# Patient Record
Sex: MALE | Race: WHITE | Employment: OTHER | URBAN - METROPOLITAN AREA
[De-identification: names, ages, dates, MRNs, and addresses within clinical notes are randomized per-mention and may not be internally consistent; named-entity substitution may affect disease eponyms.]

---

## 2017-01-12 ENCOUNTER — OFFICE VISIT (OUTPATIENT)
Dept: FAMILY MEDICINE CLINIC | Age: 37
End: 2017-01-12

## 2017-01-12 VITALS
SYSTOLIC BLOOD PRESSURE: 123 MMHG | TEMPERATURE: 98.2 F | BODY MASS INDEX: 30.32 KG/M2 | DIASTOLIC BLOOD PRESSURE: 82 MMHG | HEART RATE: 73 BPM | HEIGHT: 73 IN | RESPIRATION RATE: 16 BRPM | OXYGEN SATURATION: 97 % | WEIGHT: 228.8 LBS

## 2017-01-12 DIAGNOSIS — H83.03 INFECTION OF THE INNER EAR, BILATERAL: Primary | ICD-10-CM

## 2017-01-12 DIAGNOSIS — M54.42 CHRONIC BILATERAL LOW BACK PAIN WITH LEFT-SIDED SCIATICA: ICD-10-CM

## 2017-01-12 DIAGNOSIS — J02.9 SORE THROAT: ICD-10-CM

## 2017-01-12 DIAGNOSIS — M25.511 CHRONIC RIGHT SHOULDER PAIN: ICD-10-CM

## 2017-01-12 DIAGNOSIS — G89.29 CHRONIC RIGHT SHOULDER PAIN: ICD-10-CM

## 2017-01-12 DIAGNOSIS — G89.29 CHRONIC BILATERAL LOW BACK PAIN WITH LEFT-SIDED SCIATICA: ICD-10-CM

## 2017-01-12 LAB
S PYO AG THROAT QL: NEGATIVE
VALID INTERNAL CONTROL?: YES

## 2017-01-12 RX ORDER — TRAMADOL HYDROCHLORIDE 50 MG/1
50 TABLET ORAL
Qty: 30 TAB | Refills: 0 | Status: SHIPPED | OUTPATIENT
Start: 2017-01-12 | End: 2017-02-07 | Stop reason: SDUPTHER

## 2017-01-12 RX ORDER — AMOXICILLIN 500 MG/1
500 CAPSULE ORAL 2 TIMES DAILY
Qty: 20 CAP | Refills: 0 | Status: SHIPPED | OUTPATIENT
Start: 2017-01-12 | End: 2017-01-22

## 2017-01-12 RX ORDER — GABAPENTIN 300 MG/1
300 CAPSULE ORAL 3 TIMES DAILY
Qty: 90 CAP | Refills: 2 | Status: SHIPPED | OUTPATIENT
Start: 2017-01-12 | End: 2017-02-06 | Stop reason: SDUPTHER

## 2017-01-12 NOTE — PATIENT INSTRUCTIONS
Labyrinthitis: Care Instructions  Your Care Instructions    Labyrinthitis (say \"lab-uh-rin-THY-tus\") is a problem deep inside your inner ear. It happens when the labyrinth gets inflamed. That's the part of your inner ear that helps control your balance. The problem may cause vertigo. Vertigo makes you feel like you're spinning or whirling. You may feel sick to your stomach or vomit. You may lose your hearing for a while. Or you may have a ringing sound in your ears. Most of the time, labyrinthitis goes away on its own. This often takes several weeks. If the problem is caused by bacteria, your doctor will give you antibiotics. But most cases are caused by a virus. A virus can't be cured with antibiotics. Your doctor may give you medicines to help control the nausea and vomiting. Follow-up care is a key part of your treatment and safety. Be sure to make and go to all appointments, and call your doctor if you are having problems. It's also a good idea to know your test results and keep a list of the medicines you take. How can you care for yourself at home? · Try bed rest and keeping your head still for the first few days you have vertigo. This may help the vertigo and reduce nausea and vomiting. · Return to your normal activities if vertigo lasts more than a few days. This may be hard, but it usually helps your brain adapt to the vertigo more quickly. As your brain adapts, vertigo will slowly go away. · Do what you can to prevent falls. For example, keep your home uncluttered, and use nonskid mats around your house and in your bath. Vertigo makes you more likely to fall. · Try balance exercises for vertigo if your doctor suggests it. An example is to stand with your feet together, arms at your sides. Hold this position for 30 seconds. · Do the Eastman-Daroff exercise if your doctor suggests it. It may help your brain adapt to vertigo. ¨ Sit on the edge of your bed or sofa.   ¨ Quickly lie down on one side.  ¨ Stay in this position until the vertigo goes away or for at least 30 seconds. ¨ Sit up. If this causes vertigo, wait for it to stop. ¨ Do the exercise on the other side. ¨ Repeat these steps 10 times. Do the exercise 2 times a day until the vertigo is gone. · Take your medicines exactly as prescribed. Call your doctor if you think you are having a problem with your medicine. · If your doctor prescribed antibiotics, take them as directed. Do not stop taking them just because you feel better. You need to take the full course of antibiotics. When should you call for help? Call 911 anytime you think you may need emergency care. For example, call if:  · You passed out (lost consciousness). · You have symptoms of a stroke. These may include:  ¨ Sudden numbness, tingling, weakness, or loss of movement in your face, arm, or leg, especially on only one side of your body. ¨ Sudden vision changes. ¨ Sudden trouble speaking. ¨ Sudden confusion or trouble understanding simple statements. ¨ Sudden problems with walking or balance. ¨ A sudden, severe headache that is different from past headaches. Call your doctor now or seek immediate medical care if:  · You have new or increased nausea or vomiting. Watch closely for changes in your health, and be sure to contact your doctor if:  · Your vertigo gets worse. · Your vertigo has not gotten better in 2 weeks. Where can you learn more? Go to http://dell-sherie.info/. Enter J492 in the search box to learn more about \"Labyrinthitis: Care Instructions. \"  Current as of: July 29, 2016  Content Version: 11.1  © 4042-7839 [employer]. Care instructions adapted under license by [employer] (which disclaims liability or warranty for this information).  If you have questions about a medical condition or this instruction, always ask your healthcare professional. [place] [address] any warranty or liability for your use of this information.

## 2017-01-12 NOTE — PROGRESS NOTES
1. Have you been to the ER, urgent care clinic since your last visit? Hospitalized since your last visit? No    2. Have you seen or consulted any other health care providers outside of the [address] [place] [address] since your last visit? Include any pap smears or colon screening.  No     Patient states he is here for sore throat and follow up on labs

## 2017-01-12 NOTE — PROGRESS NOTES
Chief Complaint   Patient presents with    Follow-up     shoulder pain    Sore Throat     SUBJECTIVE:   Adina Go is a 43 y.o.  male who complains of sore throat, swollen glands, dry cough and pain while swallowing for 7 days. Denies a history of fevers, shortness of breath, wheezing and sputum production. Patient does not smoke cigarettes. Also, xr shoulder show no lesion. He is requesting for medication refill. OBJECTIVE:  Blood pressure 123/82, pulse 73, temperature 98.2 °F (36.8 °C), temperature source Oral, resp. rate 16, height 6' 1\" (1.854 m), weight 228 lb 12.8 oz (103.8 kg), SpO2 97 %. Appears well, vital signs are as noted. Ears: bilateral tm bulging, Pharynx erythema w/o lesion. Neck supple. Palpable anterior adenopathy. Nose not congested. Sinuses non tender. Lungs: clear, without wheezes or rales. Recent Results (from the past 12 hour(s))   AMB POC RAPID STREP A    Collection Time: 01/12/17 12:24 PM   Result Value Ref Range    VALID INTERNAL CONTROL POC Yes     Group A Strep Ag Negative Negative     ASSESSMENT/PLAN:    ICD-10-CM ICD-9-CM    1. Infection of the inner ear, bilateral H83.03 386.30 amoxicillin (AMOXIL) 500 mg capsule      gabapentin (NEURONTIN) 300 mg capsule   2. Sore throat J02.9 462 AMB POC RAPID STREP A      amoxicillin (AMOXIL) 500 mg capsule   3. Chronic right shoulder pain M25.511 719.41 REFERRAL TO ORTHOPEDIC SURGERY    G89.29 338.29 gabapentin (NEURONTIN) 300 mg capsule      traMADol (ULTRAM) 50 mg tablet   4. Chronic bilateral low back pain with left-sided sciatica M54.42 724.2 gabapentin (NEURONTIN) 300 mg capsule    G89.29 724.3 traMADol (ULTRAM) 50 mg tablet     338.29      Patient Instructions        Labyrinthitis: Care Instructions  Your Care Instructions    Labyrinthitis (say \"lab-uh-rin-THY-tus\") is a problem deep inside your inner ear. It happens when the labyrinth gets inflamed.  That's the part of your inner ear that helps control your balance. The problem may cause vertigo. Vertigo makes you feel like you're spinning or whirling. You may feel sick to your stomach or vomit. You may lose your hearing for a while. Or you may have a ringing sound in your ears. Most of the time, labyrinthitis goes away on its own. This often takes several weeks. If the problem is caused by bacteria, your doctor will give you antibiotics. But most cases are caused by a virus. A virus can't be cured with antibiotics. Your doctor may give you medicines to help control the nausea and vomiting. Follow-up care is a key part of your treatment and safety. Be sure to make and go to all appointments, and call your doctor if you are having problems. It's also a good idea to know your test results and keep a list of the medicines you take. How can you care for yourself at home? · Try bed rest and keeping your head still for the first few days you have vertigo. This may help the vertigo and reduce nausea and vomiting. · Return to your normal activities if vertigo lasts more than a few days. This may be hard, but it usually helps your brain adapt to the vertigo more quickly. As your brain adapts, vertigo will slowly go away. · Do what you can to prevent falls. For example, keep your home uncluttered, and use nonskid mats around your house and in your bath. Vertigo makes you more likely to fall. · Try balance exercises for vertigo if your doctor suggests it. An example is to stand with your feet together, arms at your sides. Hold this position for 30 seconds. · Do the Eastman-Daroff exercise if your doctor suggests it. It may help your brain adapt to vertigo. ¨ Sit on the edge of your bed or sofa. ¨ Quickly lie down on one side. ¨ Stay in this position until the vertigo goes away or for at least 30 seconds. ¨ Sit up. If this causes vertigo, wait for it to stop. ¨ Do the exercise on the other side. ¨ Repeat these steps 10 times.  Do the exercise 2 times a day until the vertigo is gone. · Take your medicines exactly as prescribed. Call your doctor if you think you are having a problem with your medicine. · If your doctor prescribed antibiotics, take them as directed. Do not stop taking them just because you feel better. You need to take the full course of antibiotics. When should you call for help? Call 911 anytime you think you may need emergency care. For example, call if:  · You passed out (lost consciousness). · You have symptoms of a stroke. These may include:  ¨ Sudden numbness, tingling, weakness, or loss of movement in your face, arm, or leg, especially on only one side of your body. ¨ Sudden vision changes. ¨ Sudden trouble speaking. ¨ Sudden confusion or trouble understanding simple statements. ¨ Sudden problems with walking or balance. ¨ A sudden, severe headache that is different from past headaches. Call your doctor now or seek immediate medical care if:  · You have new or increased nausea or vomiting. Watch closely for changes in your health, and be sure to contact your doctor if:  · Your vertigo gets worse. · Your vertigo has not gotten better in 2 weeks. Where can you learn more? Go to http://dell-sherie.info/. Enter M035 in the search box to learn more about \"Labyrinthitis: Care Instructions. \"  Current as of: July 29, 2016  Content Version: 11.1  © 7395-4637 [employer]. Care instructions adapted under license by [employer] (which disclaims liability or warranty for this information). If you have questions about a medical condition or this instruction, always ask your healthcare professional. [place] [address] any warranty or liability for your use of this information. Follow-up Disposition:  Return 2 weeks, for f/u treatment.

## 2017-01-12 NOTE — MR AVS SNAPSHOT
Visit Information Date & Time Provider Department Dept. Phone Encounter #  
 1/12/2017 12:00 PM Bhavya Vazquez MD [facility] [address] [place] [address] [id number] Follow-up Instructions Return 2 weeks, for f/u treatment. Your Appointments 2/6/2017  8:00 AM  
ROUTINE CARE with Bhavya Vazquez MD  
[facility]) Appt Note: 3w fu; r/s from 12-30-16; r/s from 1-11-17  
 [address] [id number]  
[address]  
  
    
 3/21/2017 10:00 AM  
ROUTINE CARE with Bhavya Vazquez MD  
[facility]) Appt Note: 4m fu  
 [address] [facility]  
[phone] Upcoming Health Maintenance Date Due DTaP/Tdap/Td series (2 - Td) 10/4/2023 Allergies as of 1/12/2017  Review Complete On: 1/12/2017 By: Doug Parada LPN No Known Allergies Current Immunizations  Reviewed on 10/4/2013 Name Date Influenza Vaccine 10/4/2013 Influenza Vaccine (Quad) PF 10/27/2016, 11/25/2014 11:48 AM  
 Tdap 10/4/2013 Not reviewed this visit You Were Diagnosed With   
  
 Codes Comments Infection of the inner ear, bilateral    -  Primary ICD-10-CM: H83.03 
ICD-9-CM: 386.30 Sore throat     ICD-10-CM: J02.9 ICD-9-CM: 228 Chronic right shoulder pain     ICD-10-CM: M25.511, G89.29 ICD-9-CM: 719.41, 338.29 Chronic bilateral low back pain with left-sided sciatica     ICD-10-CM: M54.42, G89.29 ICD-9-CM: 724.2, 724.3, 338.29 Vitals BP Pulse Temp Resp Height(growth percentile) Weight(growth percentile) 123/82 (BP 1 Location: Right arm, BP Patient Position: Sitting) 73 98.2 °F (36.8 °C) (Oral) 16 6' 1\" (1.854 m) 228 lb 12.8 oz (103.8 kg) SpO2 BMI Smoking Status 97% 30.19 kg/m2 Former Smoker Vitals History BMI and BSA Data Body Mass Index Body Surface Area  
 30.19 kg/m 2 2.31 m 2 Preferred Pharmacy Pharmacy Name Phone [address], [address] [place] [address] [phone] Your Updated Medication List  
  
   
This list is accurate as of: 1/12/17 12:59 PM.  Always use your most recent med list.  
  
  
  
  
 amoxicillin 500 mg capsule Commonly known as:  AMOXIL Take 1 Cap by mouth two (2) times a day for 10 days. cholecalciferol (VITAMIN D3) 5,000 unit Tab tablet Commonly known as:  VITAMIN D3 Take 1 Tab by mouth daily. diclofenac 100 mg ER tablet Commonly known as:  VOLTAREN XR Take 1 Tab by mouth daily. With meal  
  
 gabapentin 300 mg capsule Commonly known as:  NEURONTIN Take 1 Cap by mouth three (3) times daily. Indications: NEUROPATHIC PAIN  
  
 methylPREDNISolone 4 mg tablet Commonly known as:  [place] Take as directed  
  
 traMADol 50 mg tablet Commonly known as:  ULTRAM  
Take 1 Tab by mouth every eight (8) hours as needed. Max Daily Amount: 150 mg. Indications: PAIN Prescriptions Printed Refills  
 traMADol (ULTRAM) 50 mg tablet 0 Sig: Take 1 Tab by mouth every eight (8) hours as needed. Max Daily Amount: 150 mg. Indications: PAIN Class: Print Route: Oral  
  
Prescriptions Sent to Pharmacy Refills  
 amoxicillin (AMOXIL) 500 mg capsule 0 Sig: Take 1 Cap by mouth two (2) times a day for 10 days. Class: Normal  
 Pharmacy: [address], [address] [place] [address] Ph #: [phone] Route: Oral  
 gabapentin (NEURONTIN) 300 mg capsule 2 Sig: Take 1 Cap by mouth three (3) times daily. Indications: NEUROPATHIC PAIN Class: Normal  
 Pharmacy: [address], [address] [place] [address] Ph #: [phone] Route: Oral  
  
We Performed the Following AMB POC RAPID STREP A [97497 CPT(R)] REFERRAL TO ORTHOPEDIC SURGERY [REF62 Custom] Comments:  
 Please evaluate patient for right shoulder bursitis Follow-up Instructions Return 2 weeks, for f/u treatment. Referral Information Referral ID Referred By Referred To  
  
 [id number] [facility] [id number], [address] Vis[address] Status Start Date End Date 1 New Request 1/12/17 1/12/18 If your referral has a status of pending review or denied, additional information will be sent to support the outcome of this decision. Patient Instructions Labyrinthitis: Care Instructions Your Care Instructions Labyrinthitis (say \"lab-uh-rin-THY-tus\") is a problem deep inside your inner ear. It happens when the labyrinth gets inflamed. That's the part of your inner ear that helps control your balance. The problem may cause vertigo. Vertigo makes you feel like you're spinning or whirling. You may feel sick to your stomach or vomit. You may lose your hearing for a while. Or you may have a ringing sound in your ears. Most of the time, labyrinthitis goes away on its own. This often takes several weeks. If the problem is caused by bacteria, your doctor will give you antibiotics. But most cases are caused by a virus. A virus can't be cured with antibiotics. Your doctor may give you medicines to help control the nausea and vomiting. Follow-up care is a key part of your treatment and safety. Be sure to make and go to all appointments, and call your doctor if you are having problems. It's also a good idea to know your test results and keep a list of the medicines you take. How can you care for yourself at home? · Try bed rest and keeping your head still for the first few days you have vertigo. This may help the vertigo and reduce nausea and vomiting. · Return to your normal activities if vertigo lasts more than a few days. This may be hard, but it usually helps your brain adapt to the vertigo more quickly. As your brain adapts, vertigo will slowly go away. · Do what you can to prevent falls. For example, keep your home uncluttered, and use nonskid mats around your house and in your bath. Vertigo makes you more likely to fall. · Try balance exercises for vertigo if your doctor suggests it. An example is to stand with your feet together, arms at your sides. Hold this position for 30 seconds. · Do the Eastman-Daroff exercise if your doctor suggests it. It may help your brain adapt to vertigo. ¨ Sit on the edge of your bed or sofa. ¨ Quickly lie down on one side. ¨ Stay in this position until the vertigo goes away or for at least 30 seconds. ¨ Sit up. If this causes vertigo, wait for it to stop. ¨ Do the exercise on the other side. ¨ Repeat these steps 10 times. Do the exercise 2 times a day until the vertigo is gone. · Take your medicines exactly as prescribed. Call your doctor if you think you are having a problem with your medicine. · If your doctor prescribed antibiotics, take them as directed. Do not stop taking them just because you feel better. You need to take the full course of antibiotics. When should you call for help? Call 911 anytime you think you may need emergency care. For example, call if: 
· You passed out (lost consciousness). · You have symptoms of a stroke. These may include: 
¨ Sudden numbness, tingling, weakness, or loss of movement in your face, arm, or leg, especially on only one side of your body. ¨ Sudden vision changes. ¨ Sudden trouble speaking. ¨ Sudden confusion or trouble understanding simple statements. ¨ Sudden problems with walking or balance. ¨ A sudden, severe headache that is different from past headaches. Call your doctor now or seek immediate medical care if: 
· You have new or increased nausea or vomiting.  
Watch closely for changes in your health, and be sure to contact your doctor if: 
· Your vertigo gets worse. · Your vertigo has not gotten better in 2 weeks. Where can you learn more? Go to http://dell-sherie.info/. Enter M676 in the search box to learn more about \"Labyrinthitis: Care Instructions. \" Current as of: July 29, 2016 Content Version: 11.1 © 6328-6071 [employer]. Care instructions adapted under license by [employer] (which disclaims liability or warranty for this information). If you have questions about a medical condition or this instruction, always ask your healthcare professional. [address] any warranty or liability for your use of this information. Introducing [facility]! Dear Argenis Oglesby: Thank you for requesting a [employer] account. Our records indicate that you already have an active [employer] account. You can access your account anytime at [email or web address] Did you know that you can access your hospital and ER discharge instructions at any time in [employer]? You can also review all of your test results from your hospital stay or ER visit. Additional Information If you have questions, please visit the Frequently Asked Questions section of the [employer] website at [email or web address]. Remember, [employer] is NOT to be used for urgent needs. For medical emergencies, dial 911. Now available from your iPhone and Android! Please provide this summary of care documentation to your next provider. Your primary care clinician is listed as Bhavya Vazquez. If you have any questions after today's visit, please call [phone].

## 2017-02-06 DIAGNOSIS — G89.29 CHRONIC BILATERAL LOW BACK PAIN WITH LEFT-SIDED SCIATICA: ICD-10-CM

## 2017-02-06 DIAGNOSIS — M25.511 CHRONIC RIGHT SHOULDER PAIN: ICD-10-CM

## 2017-02-06 DIAGNOSIS — H83.03 INFECTION OF THE INNER EAR, BILATERAL: ICD-10-CM

## 2017-02-06 DIAGNOSIS — G89.29 CHRONIC RIGHT SHOULDER PAIN: ICD-10-CM

## 2017-02-06 DIAGNOSIS — E55.9 HYPOVITAMINOSIS D: ICD-10-CM

## 2017-02-06 DIAGNOSIS — M54.42 CHRONIC BILATERAL LOW BACK PAIN WITH LEFT-SIDED SCIATICA: ICD-10-CM

## 2017-02-07 DIAGNOSIS — M25.511 CHRONIC RIGHT SHOULDER PAIN: ICD-10-CM

## 2017-02-07 DIAGNOSIS — G89.29 CHRONIC BILATERAL LOW BACK PAIN WITH LEFT-SIDED SCIATICA: ICD-10-CM

## 2017-02-07 DIAGNOSIS — M54.42 CHRONIC BILATERAL LOW BACK PAIN WITH LEFT-SIDED SCIATICA: ICD-10-CM

## 2017-02-07 DIAGNOSIS — G89.29 CHRONIC RIGHT SHOULDER PAIN: ICD-10-CM

## 2017-02-16 NOTE — TELEPHONE ENCOUNTER
From: Jatin Winters. To: Diane Starks MD  Sent: 2/7/2017 10:50 AM EST  Subject: Medication Renewal Request    Original authorizing provider: MD Jatin Eden. would like a refill of the following medications:  traMADol (ULTRAM) 50 mg tablet [Moisés Wagoner MD]    Preferred pharmacy: [facility]/PHARMACY #[id number] - [address] [facility], [address] [place] [address] AT [address]NER OF [facility] [address]    Comment:

## 2017-02-16 NOTE — TELEPHONE ENCOUNTER
From: Evy Webb. To: Marline Aceves MD  Sent: 2/6/2017 3:19 PM EST  Subject: Medication Renewal Request    Original authorizing provider: MD Evy Lynn. would like a refill of the following medications:  cholecalciferol, VITAMIN D3, (VITAMIN D3) 5,000 unit tab tablet [Moisés Hlot MD]  gabapentin (NEURONTIN) 300 mg capsule [Moisés Sommer MD]    Preferred pharmacy: [facility]/PHARMACY #[id number] - [facility], [address] [place] [address] AT [address] OF [facility] [address]    Comment:

## 2017-02-17 RX ORDER — CHOLECALCIFEROL TAB 125 MCG (5000 UNIT) 125 MCG
5000 TAB ORAL DAILY
Qty: 90 TAB | Refills: 3 | Status: SHIPPED | OUTPATIENT
Start: 2017-02-17

## 2017-02-17 RX ORDER — TRAMADOL HYDROCHLORIDE 50 MG/1
50 TABLET ORAL
Qty: 30 TAB | Refills: 0 | Status: SHIPPED | OUTPATIENT
Start: 2017-02-17 | End: 2017-02-22

## 2017-02-17 RX ORDER — GABAPENTIN 300 MG/1
300 CAPSULE ORAL 3 TIMES DAILY
Qty: 90 CAP | Refills: 2 | Status: SHIPPED | OUTPATIENT
Start: 2017-02-17 | End: 2017-05-12 | Stop reason: SDUPTHER

## 2017-02-22 ENCOUNTER — APPOINTMENT (OUTPATIENT)
Dept: CT IMAGING | Age: 37
End: 2017-02-22
Attending: EMERGENCY MEDICINE
Payer: COMMERCIAL

## 2017-02-22 ENCOUNTER — HOSPITAL ENCOUNTER (EMERGENCY)
Age: 37
Discharge: HOME | End: 2017-02-22
Attending: EMERGENCY MEDICINE
Payer: COMMERCIAL

## 2017-02-22 VITALS
HEART RATE: 86 BPM | HEIGHT: 73 IN | BODY MASS INDEX: 30.48 KG/M2 | TEMPERATURE: 97.7 F | DIASTOLIC BLOOD PRESSURE: 77 MMHG | RESPIRATION RATE: 20 BRPM | SYSTOLIC BLOOD PRESSURE: 162 MMHG | OXYGEN SATURATION: 96 % | WEIGHT: 230 LBS

## 2017-02-22 DIAGNOSIS — S39.012D BACK STRAIN, SUBSEQUENT ENCOUNTER: ICD-10-CM

## 2017-02-22 DIAGNOSIS — Z87.39 H/O LOW BACK PAIN: ICD-10-CM

## 2017-02-22 DIAGNOSIS — M54.41 ACUTE BILATERAL LOW BACK PAIN WITH BILATERAL SCIATICA: Primary | ICD-10-CM

## 2017-02-22 DIAGNOSIS — M62.830 SPASM OF BACK MUSCLES: ICD-10-CM

## 2017-02-22 DIAGNOSIS — M54.42 ACUTE BILATERAL LOW BACK PAIN WITH BILATERAL SCIATICA: Primary | ICD-10-CM

## 2017-02-22 LAB
AMPHET UR QL SCN: NEGATIVE
ANION GAP BLD CALC-SCNC: 9 MMOL/L (ref 5–15)
APPEARANCE UR: CLEAR
BACTERIA URNS QL MICRO: NEGATIVE /HPF
BARBITURATES UR QL SCN: NEGATIVE
BASOPHILS # BLD AUTO: 0 K/UL (ref 0–0.1)
BASOPHILS # BLD: 0 % (ref 0–1)
BENZODIAZ UR QL: NEGATIVE
BILIRUB UR QL: NEGATIVE
BUN SERPL-MCNC: 9 MG/DL (ref 6–20)
BUN/CREAT SERPL: 8 (ref 12–20)
CALCIUM SERPL-MCNC: 8.7 MG/DL (ref 8.5–10.1)
CANNABINOIDS UR QL SCN: NEGATIVE
CHLORIDE SERPL-SCNC: 105 MMOL/L (ref 97–108)
CO2 SERPL-SCNC: 29 MMOL/L (ref 21–32)
COCAINE UR QL SCN: NEGATIVE
COLOR UR: ABNORMAL
CREAT SERPL-MCNC: 1.06 MG/DL (ref 0.7–1.3)
DRUG SCRN COMMENT,DRGCM: NORMAL
EOSINOPHIL # BLD: 0.1 K/UL (ref 0–0.4)
EOSINOPHIL NFR BLD: 1 % (ref 0–7)
EPITH CASTS URNS QL MICRO: ABNORMAL /LPF
ERYTHROCYTE [DISTWIDTH] IN BLOOD BY AUTOMATED COUNT: 12.6 % (ref 11.5–14.5)
GLUCOSE SERPL-MCNC: 204 MG/DL (ref 65–100)
GLUCOSE UR STRIP.AUTO-MCNC: NEGATIVE MG/DL
HCT VFR BLD AUTO: 41.8 % (ref 36.6–50.3)
HGB BLD-MCNC: 13.9 G/DL (ref 12.1–17)
HGB UR QL STRIP: NEGATIVE
KETONES UR QL STRIP.AUTO: NEGATIVE MG/DL
LEUKOCYTE ESTERASE UR QL STRIP.AUTO: NEGATIVE
LYMPHOCYTES # BLD AUTO: 12 % (ref 12–49)
LYMPHOCYTES # BLD: 0.8 K/UL (ref 0.8–3.5)
MCH RBC QN AUTO: 30.1 PG (ref 26–34)
MCHC RBC AUTO-ENTMCNC: 33.3 G/DL (ref 30–36.5)
MCV RBC AUTO: 90.5 FL (ref 80–99)
METHADONE UR QL: NEGATIVE
MONOCYTES # BLD: 0.2 K/UL (ref 0–1)
MONOCYTES NFR BLD AUTO: 3 % (ref 5–13)
MUCOUS THREADS URNS QL MICRO: ABNORMAL /LPF
NEUTS SEG # BLD: 6 K/UL (ref 1.8–8)
NEUTS SEG NFR BLD AUTO: 84 % (ref 32–75)
NITRITE UR QL STRIP.AUTO: NEGATIVE
OPIATES UR QL: NEGATIVE
PCP UR QL: NEGATIVE
PH UR STRIP: 7.5 [PH] (ref 5–8)
PLATELET # BLD AUTO: 239 K/UL (ref 150–400)
POTASSIUM SERPL-SCNC: 3.6 MMOL/L (ref 3.5–5.1)
PROT UR STRIP-MCNC: NEGATIVE MG/DL
RBC # BLD AUTO: 4.62 M/UL (ref 4.1–5.7)
RBC #/AREA URNS HPF: ABNORMAL /HPF (ref 0–5)
SODIUM SERPL-SCNC: 143 MMOL/L (ref 136–145)
SP GR UR REFRACTOMETRY: <1.005 (ref 1–1.03)
UA: UC IF INDICATED,UAUC: ABNORMAL
UROBILINOGEN UR QL STRIP.AUTO: 0.2 EU/DL (ref 0.2–1)
WBC # BLD AUTO: 7.1 K/UL (ref 4.1–11.1)
WBC URNS QL MICRO: ABNORMAL /HPF (ref 0–4)

## 2017-02-22 PROCEDURE — 99285 EMERGENCY DEPT VISIT HI MDM: CPT

## 2017-02-22 PROCEDURE — 74011250637 HC RX REV CODE- 250/637: Performed by: EMERGENCY MEDICINE

## 2017-02-22 PROCEDURE — 96376 TX/PRO/DX INJ SAME DRUG ADON: CPT

## 2017-02-22 PROCEDURE — 74011250636 HC RX REV CODE- 250/636: Performed by: EMERGENCY MEDICINE

## 2017-02-22 PROCEDURE — 85025 COMPLETE CBC W/AUTO DIFF WBC: CPT | Performed by: EMERGENCY MEDICINE

## 2017-02-22 PROCEDURE — 80048 BASIC METABOLIC PNL TOTAL CA: CPT | Performed by: EMERGENCY MEDICINE

## 2017-02-22 PROCEDURE — 96361 HYDRATE IV INFUSION ADD-ON: CPT

## 2017-02-22 PROCEDURE — 80307 DRUG TEST PRSMV CHEM ANLYZR: CPT | Performed by: EMERGENCY MEDICINE

## 2017-02-22 PROCEDURE — 36415 COLL VENOUS BLD VENIPUNCTURE: CPT | Performed by: EMERGENCY MEDICINE

## 2017-02-22 PROCEDURE — 96374 THER/PROPH/DIAG INJ IV PUSH: CPT

## 2017-02-22 PROCEDURE — 81001 URINALYSIS AUTO W/SCOPE: CPT | Performed by: EMERGENCY MEDICINE

## 2017-02-22 PROCEDURE — 96375 TX/PRO/DX INJ NEW DRUG ADDON: CPT

## 2017-02-22 PROCEDURE — 72131 CT LUMBAR SPINE W/O DYE: CPT

## 2017-02-22 RX ORDER — HYDROMORPHONE HYDROCHLORIDE 2 MG/1
2 TABLET ORAL ONCE
Status: COMPLETED | OUTPATIENT
Start: 2017-02-22 | End: 2017-02-22

## 2017-02-22 RX ORDER — HYDROMORPHONE HYDROCHLORIDE 1 MG/ML
0.5 INJECTION, SOLUTION INTRAMUSCULAR; INTRAVENOUS; SUBCUTANEOUS
Status: COMPLETED | OUTPATIENT
Start: 2017-02-22 | End: 2017-02-22

## 2017-02-22 RX ORDER — DEXAMETHASONE SODIUM PHOSPHATE 10 MG/ML
10 INJECTION INTRAMUSCULAR; INTRAVENOUS
Status: COMPLETED | OUTPATIENT
Start: 2017-02-22 | End: 2017-02-22

## 2017-02-22 RX ORDER — KETOROLAC TROMETHAMINE 30 MG/ML
30 INJECTION, SOLUTION INTRAMUSCULAR; INTRAVENOUS
Status: COMPLETED | OUTPATIENT
Start: 2017-02-22 | End: 2017-02-22

## 2017-02-22 RX ORDER — SODIUM CHLORIDE 9 MG/ML
150 INJECTION, SOLUTION INTRAVENOUS CONTINUOUS
Status: DISCONTINUED | OUTPATIENT
Start: 2017-02-22 | End: 2017-02-23 | Stop reason: HOSPADM

## 2017-02-22 RX ORDER — METHOCARBAMOL 750 MG/1
750 TABLET, FILM COATED ORAL
Qty: 16 TAB | Refills: 0 | Status: SHIPPED | OUTPATIENT
Start: 2017-02-22 | End: 2017-02-22

## 2017-02-22 RX ORDER — LIDOCAINE 50 MG/G
1 PATCH TOPICAL EVERY 24 HOURS
Qty: 5 PATCH | Refills: 0 | Status: SHIPPED | OUTPATIENT
Start: 2017-02-22 | End: 2017-02-27

## 2017-02-22 RX ORDER — METHOCARBAMOL 750 MG/1
750 TABLET, FILM COATED ORAL
Qty: 16 TAB | Refills: 0 | Status: SHIPPED | OUTPATIENT
Start: 2017-02-22 | End: 2017-10-25 | Stop reason: SDUPTHER

## 2017-02-22 RX ORDER — DIAZEPAM 10 MG/2ML
5 INJECTION INTRAMUSCULAR
Status: COMPLETED | OUTPATIENT
Start: 2017-02-22 | End: 2017-02-22

## 2017-02-22 RX ORDER — HYDROMORPHONE HYDROCHLORIDE 2 MG/1
2 TABLET ORAL
Qty: 15 TAB | Refills: 0 | Status: SHIPPED | OUTPATIENT
Start: 2017-02-22 | End: 2017-07-06 | Stop reason: ALTCHOICE

## 2017-02-22 RX ORDER — DIPHENHYDRAMINE HYDROCHLORIDE 50 MG/ML
25 INJECTION, SOLUTION INTRAMUSCULAR; INTRAVENOUS
Status: COMPLETED | OUTPATIENT
Start: 2017-02-22 | End: 2017-02-22

## 2017-02-22 RX ORDER — LIDOCAINE 50 MG/G
1 PATCH TOPICAL ONCE
Status: DISCONTINUED | OUTPATIENT
Start: 2017-02-22 | End: 2017-02-23 | Stop reason: HOSPADM

## 2017-02-22 RX ORDER — HYDROMORPHONE HYDROCHLORIDE 1 MG/ML
1 INJECTION, SOLUTION INTRAMUSCULAR; INTRAVENOUS; SUBCUTANEOUS
Status: COMPLETED | OUTPATIENT
Start: 2017-02-22 | End: 2017-02-22

## 2017-02-22 RX ORDER — METHOCARBAMOL 500 MG/1
500 TABLET, FILM COATED ORAL ONCE
Status: COMPLETED | OUTPATIENT
Start: 2017-02-22 | End: 2017-02-22

## 2017-02-22 RX ORDER — PREDNISONE 10 MG/1
TABLET ORAL
Qty: 11 TAB | Refills: 0 | Status: SHIPPED | OUTPATIENT
Start: 2017-02-22 | End: 2017-05-28 | Stop reason: CLARIF

## 2017-02-22 RX ORDER — HYDROMORPHONE HYDROCHLORIDE 1 MG/ML
1 INJECTION, SOLUTION INTRAMUSCULAR; INTRAVENOUS; SUBCUTANEOUS
Status: DISCONTINUED | OUTPATIENT
Start: 2017-02-22 | End: 2017-02-22

## 2017-02-22 RX ORDER — MORPHINE SULFATE 2 MG/ML
2 INJECTION, SOLUTION INTRAMUSCULAR; INTRAVENOUS
Status: COMPLETED | OUTPATIENT
Start: 2017-02-22 | End: 2017-02-22

## 2017-02-22 RX ADMIN — SODIUM CHLORIDE 150 ML/HR: 900 INJECTION, SOLUTION INTRAVENOUS at 16:33

## 2017-02-22 RX ADMIN — KETOROLAC TROMETHAMINE 30 MG: 30 INJECTION, SOLUTION INTRAMUSCULAR at 17:21

## 2017-02-22 RX ADMIN — DIAZEPAM 5 MG: 5 INJECTION, SOLUTION INTRAMUSCULAR; INTRAVENOUS at 18:33

## 2017-02-22 RX ADMIN — DEXAMETHASONE SODIUM PHOSPHATE 10 MG: 10 INJECTION, SOLUTION INTRAMUSCULAR; INTRAVENOUS at 17:20

## 2017-02-22 RX ADMIN — DIPHENHYDRAMINE HYDROCHLORIDE 25 MG: 50 INJECTION INTRAMUSCULAR; INTRAVENOUS at 19:50

## 2017-02-22 RX ADMIN — HYDROMORPHONE HYDROCHLORIDE 1 MG: 1 INJECTION, SOLUTION INTRAMUSCULAR; INTRAVENOUS; SUBCUTANEOUS at 16:26

## 2017-02-22 RX ADMIN — DIAZEPAM 5 MG: 5 INJECTION, SOLUTION INTRAMUSCULAR; INTRAVENOUS at 16:29

## 2017-02-22 RX ADMIN — HYDROMORPHONE HYDROCHLORIDE 0.5 MG: 1 INJECTION, SOLUTION INTRAMUSCULAR; INTRAVENOUS; SUBCUTANEOUS at 17:21

## 2017-02-22 RX ADMIN — HYDROMORPHONE HYDROCHLORIDE 2 MG: 2 TABLET ORAL at 18:33

## 2017-02-22 RX ADMIN — METHOCARBAMOL 500 MG: 500 TABLET ORAL at 17:20

## 2017-02-22 RX ADMIN — Medication 2 MG: at 19:50

## 2017-02-22 NOTE — LETTER
[facility] EMERGENCY DEPT 
[address] [id number] [phone] Work/School Note Date: 2/22/2017 To Whom It May concern: 
 
Evy Webb. was seen and treated today in the emergency room by the following provider(s): 
Attending Provider: Romero Ramos MD. Evy Cheek may return to work on 2/27/2017. Sincerely, Romero Ramos MD

## 2017-02-22 NOTE — ED NOTES
Pt eating chips, soda, and crackers while staff entered the room. Pt denied any nausea. No si/s of acute distress. Pt reported 10/10 back pain but appears more comfortable than earlier. Pt's girlfriend at bedside. Call bell within reach.

## 2017-02-22 NOTE — ED PROVIDER NOTES
HPI Comments: Bright Mojica is a 43 y.o. male, pmhx significant for HTN, and chronic back pain, who presents via EMS to the ED c/o acute on chronic bilateral lower back pain, worse on the right x 3 days. Pt describes them as sharp spasms on his back and is unable to tolerate walking. Pt states that at the onset of his pain, he was in [facility] for work 3 days ago and states that he already had some pain, but it was manageable. He was at dinner and stood up when he states that he almost fell secondary to pain, and he felt like his back was going to give way. He was seen in an ED in [facility] for the pain and discharged home. He arrived back in [place] the next day, and states that he \"stood up, felt a pop, and passed out from the pain. \" On Monday, pt states that he went from a sitting to a standing position and had to lower himself to the floor secondary to pain, adding that he passed out twice secondary to pain. Pt follows up with a specialist for his back and is prescribed Tramadol which he takes as needed for pain. He states that his original injury was 18 years ago, and that he had a spinal fusion 15 years ago to try to manage the pain. He had an MRI study in November 2016 which was unremarkable. He notes that 1 month ago he was given an epidural steroid injection that he states was very effective for his pain until 3 days ago. Pt denies any strenuous duty at work. Pt specifically denies constipation, diarrhea, fevers, and chills. PCP: Juan Antonio Dee MD    Social Hx: -tobacco (former), +EtOH, -Illicit Drugs    There are no other complaints, changes, or physical findings at this time. The history is provided by the patient. No  was used.         Past Medical History:   Diagnosis Date    Back pain     Chronic pain     back    Concussion     H/O spinal fusion     15 years ago    Hypertension        Past Surgical History:   Procedure Laterality Date    HX ORTHOPAEDIC      Spinal fusion L5 S1 in 2003         Family History:   Problem Relation Age of Onset    Diabetes Mother     Hypertension Mother     Thyroid Disease Mother      hypothyroidism    Heart Attack Mother      age 64-smoker    [facility] Arthritis-osteo Mother     Asthma Mother     Heart Disease Mother     Elevated Lipids Father     Heart Disease Father     Hypertension Father     Diabetes Father     Cancer Father      prostate    Heart Attack Father      in 52's    Alcohol abuse Father     Arthritis-osteo Father     Psychiatric Disorder Father        Social History     Social History    Marital status:      Spouse name: N/A    Number of children: N/A    Years of education: N/A     Occupational History    Not on file. Social History Main Topics    Smoking status: Former Smoker     Packs/day: 1.00     Years: 10.00     Types: Cigarettes     Quit date: 4/8/2014    Smokeless tobacco: Never Used    Alcohol use Yes    Drug use: No    Sexual activity: Yes     Partners: Female     Birth control/ protection: None     Other Topics Concern    Not on file     Social History Narrative         ALLERGIES: Review of patient's allergies indicates no known allergies. Review of Systems   Constitutional: Negative for chills and fever. HENT: Negative for congestion and rhinorrhea. Eyes: Negative for visual disturbance. Respiratory: Negative for cough and shortness of breath. Cardiovascular: Negative for chest pain. Gastrointestinal: Negative for abdominal pain, constipation, diarrhea and vomiting. Genitourinary: Negative for difficulty urinating and dysuria. Musculoskeletal: Positive for back pain. Negative for arthralgias. Skin: Negative for rash. Neurological: Negative for dizziness, light-headedness and headaches.        Patient Vitals for the past 12 hrs:   Temp Pulse Resp BP SpO2   02/22/17 1902 - - - 162/77 96 %   02/22/17 1900 - - - - 95 %   02/22/17 1830 - - - - 97 %   02/22/17 1800 - - - 156/88 96 %   02/22/17 1745 - - - - 99 %   02/22/17 1734 - - - (!) 146/100 100 %   02/22/17 1730 - - - - 100 %   02/22/17 1700 - - - (!) 137/114 96 %   02/22/17 1646 - - - (!) 128/102 98 %   02/22/17 1630 - - - - 99 %   02/22/17 1600 - - - - 97 %   02/22/17 1530 - - - - 100 %   02/22/17 1526 97.7 °F (36.5 °C) 86 20 138/85 99 %     Physical Exam   Constitutional: He is oriented to person, place, and time. He appears well-developed and well-nourished. Severe painful distress   Cardiovascular: Normal rate, regular rhythm, normal heart sounds and intact distal pulses. Pulmonary/Chest: Effort normal and breath sounds normal. No respiratory distress. Abdominal: Soft. There is no tenderness. Musculoskeletal:        Lumbar back: He exhibits tenderness, pain and spasm. He exhibits no swelling, no edema and no deformity. Diffused lumbar tenderness  (+) lrom aneesh lower ext due to back pain   Neurological: He is alert and oriented to person, place, and time. Skin: Skin is warm and dry. Well healed midline surgical scar  No redness, no streaking   Nursing note and vitals reviewed. MDM  Number of Diagnoses or Management Options  Acute bilateral low back pain with bilateral sciatica:   Back strain, subsequent encounter:   H/O low back pain:   Spasm of back muscles:   Diagnosis management comments: DDx: strain, spasm, radicular pain       Amount and/or Complexity of Data Reviewed  Clinical lab tests: reviewed and ordered  Review and summarize past medical records: yes    Patient Progress  Patient progress: stable    ED Course       Procedures    PROGRESS NOTE   7:14 PM  Only mild improvement to pain. Muscle spasm has improved, but pain still present after medications given to him in the ED. Will obtain image for further evaluation. Written by INOCENTE Espinal, as dictated by Ruben Ramos MD.     PROGRESS NOTE   8:41 PM  Pt was re-evaluated and states that he feels some alleviation.  He is able to move his legs with mild discomfort. Written by INOCENTE Collier, as dictated by Tiffanie Conrad MD.     LABORATORY TESTS:  Recent Results (from the past 12 hour(s))   URINALYSIS W/ REFLEX CULTURE    Collection Time: 02/22/17  4:40 PM   Result Value Ref Range    Color YELLOW/STRAW      Appearance CLEAR CLEAR      Specific gravity <1.005 1.003 - 1.030    pH (UA) 7.5 5.0 - 8.0      Protein NEGATIVE  NEG mg/dL    Glucose NEGATIVE  NEG mg/dL    Ketone NEGATIVE  NEG mg/dL    Bilirubin NEGATIVE  NEG      Blood NEGATIVE  NEG      Urobilinogen 0.2 0.2 - 1.0 EU/dL    Nitrites NEGATIVE  NEG      Leukocyte Esterase NEGATIVE  NEG      WBC 0-4 0 - 4 /hpf    RBC 0-5 0 - 5 /hpf    Epithelial cells FEW FEW /lpf    Bacteria NEGATIVE  NEG /hpf    UA:UC IF INDICATED CULTURE NOT INDICATED BY UA RESULT CNI      Mucus TRACE (A) NEG /lpf   DRUG SCREEN, URINE    Collection Time: 02/22/17  4:40 PM   Result Value Ref Range    AMPHETAMINE NEGATIVE  NEG      BARBITURATES NEGATIVE  NEG      BENZODIAZEPINE NEGATIVE  NEG      COCAINE NEGATIVE  NEG      METHADONE NEGATIVE  NEG      OPIATES NEGATIVE  NEG      PCP(PHENCYCLIDINE) NEGATIVE  NEG      THC (TH-CANNABINOL) NEGATIVE  NEG      Drug screen comment (NOTE)    CBC WITH AUTOMATED DIFF    Collection Time: 02/22/17  7:47 PM   Result Value Ref Range    WBC 7.1 4.1 - 11.1 K/uL    RBC 4.62 4.10 - 5.70 M/uL    HGB 13.9 12.1 - 17.0 g/dL    HCT 41.8 36.6 - 50.3 %    MCV 90.5 80.0 - 99.0 FL    MCH 30.1 26.0 - 34.0 PG    MCHC 33.3 30.0 - 36.5 g/dL    RDW 12.6 11.5 - 14.5 %    PLATELET 956 079 - 173 K/uL    NEUTROPHILS 84 (H) 32 - 75 %    LYMPHOCYTES 12 12 - 49 %    MONOCYTES 3 (L) 5 - 13 %    EOSINOPHILS 1 0 - 7 %    BASOPHILS 0 0 - 1 %    ABS. NEUTROPHILS 6.0 1.8 - 8.0 K/UL    ABS. LYMPHOCYTES 0.8 0.8 - 3.5 K/UL    ABS. MONOCYTES 0.2 0.0 - 1.0 K/UL    ABS. EOSINOPHILS 0.1 0.0 - 0.4 K/UL    ABS.  BASOPHILS 0.0 0.0 - 0.1 K/UL   METABOLIC PANEL, BASIC    Collection Time: 02/22/17 7:47 PM   Result Value Ref Range    Sodium 143 136 - 145 mmol/L    Potassium 3.6 3.5 - 5.1 mmol/L    Chloride 105 97 - 108 mmol/L    CO2 29 21 - 32 mmol/L    Anion gap 9 5 - 15 mmol/L    Glucose 204 (H) 65 - 100 mg/dL    BUN 9 6 - 20 MG/DL    Creatinine 1.06 0.70 - 1.30 MG/DL    BUN/Creatinine ratio 8 (L) 12 - 20      GFR est AA >60 >60 ml/min/1.73m2    GFR est non-AA >60 >60 ml/min/1.73m2    Calcium 8.7 8.5 - 10.1 MG/DL       IMAGING RESULTS:  CT SPINE LUMB WO CONT   Final Result   HISTORY: Severe low back pain for 2 to 3 days, unable to ambulate,     COMPARISON: MRI Lumbar Spine 11/17/2016. Lumbar CT myelogram 3/25/2013.     TECHNIQUE: Noncontrast axial CT imaging of the lumbar spine was performed. Coronal and sagittal reconstructions were obtained. CT dose reduction was  achieved through the use of a standardized protocol tailored for this  examination and automatic exposure control for dose modulation.     FINDINGS:     T12-L1: The spinal canal and neural foramina are widely patent.     L1-2: The spinal canal and neural foramina are widely patent.     L2-3: The spinal canal and neural foramina are widely patent.     L3-4: The spinal canal and neural foramina are widely patent.     L4-5: The spinal canal and neural foramina are widely patent.     L5-S1: There is remote posterior decompression with pedicle screw and tommy  fixation with solid fusion, with no apparent complication or change. There is  metallic artifact partly obscuring the spinal canal but without evidence of  spinal or foraminal stenosis.     Paraspinal soft tissues are unremarkable. Benign exophytic sclerotic lesion in  the right ilium adjacent to the SI joint is stable.     IMPRESSION  IMPRESSION:     1. No acute finding or significant change.        MEDICATIONS GIVEN:  Medications   0.9% sodium chloride infusion (150 mL/hr IntraVENous New Bag 2/22/17 0788)   lidocaine (LIDODERM) 5 % patch 1 Patch (1 Patch TransDERmal Apply Patch 2/22/17 4402) HYDROmorphone (PF) (DILAUDID) injection 1 mg (1 mg IntraVENous Given 2/22/17 1626)   diazePAM (VALIUM) injection 5 mg (5 mg IntraVENous Given 2/22/17 1629)   ketorolac (TORADOL) injection 30 mg (30 mg IntraVENous Given 2/22/17 1721)   HYDROmorphone (PF) (DILAUDID) injection 0.5 mg (0.5 mg IntraVENous Given 2/22/17 1721)   methocarbamol (ROBAXIN) tablet 500 mg (500 mg Oral Given 2/22/17 1720)   dexamethasone (PF) (DECADRON) injection 10 mg (10 mg IntraVENous Given 2/22/17 1720)   HYDROmorphone (DILAUDID) tablet 2 mg (2 mg Oral Given 2/22/17 1833)   diazePAM (VALIUM) injection 5 mg (5 mg IntraVENous Given 2/22/17 1833)   diphenhydrAMINE (BENADRYL) injection 25 mg (25 mg IntraVENous Given 2/22/17 1950)   morphine injection 2 mg (2 mg IntraVENous Given 2/22/17 1950)     IMPRESSION:  1. Acute bilateral low back pain with bilateral sciatica    2. H/O low back pain    3. Back strain, subsequent encounter    4. Spasm of back muscles        PLAN:  1. Discharge home. Current Discharge Medication List      START taking these medications    Details   predniSONE (DELTASONE) 10 mg tablet With Breakfast  2 tab x 3 days then 1 tab x 5 days  Qty: 11 Tab, Refills: 0      methocarbamol (ROBAXIN-750) 750 mg tablet Take 1 Tab by mouth four (4) times daily as needed. Qty: 16 Tab, Refills: 0      lidocaine (LIDODERM) 5 % 1 Patch by TransDERmal route every twenty-four (24) hours for 5 days. Apply patch to the affected area for 12 hours a day and remove for 12 hours a day. Qty: 5 Patch, Refills: 0         CONTINUE these medications which have NOT CHANGED    Details   gabapentin (NEURONTIN) 300 mg capsule Take 1 Cap by mouth three (3) times daily. Indications: NEUROPATHIC PAIN  Qty: 90 Cap, Refills: 2    Associated Diagnoses: Chronic bilateral low back pain with left-sided sciatica; Infection of the inner ear, bilateral; Chronic right shoulder pain      traMADol (ULTRAM) 50 mg tablet Take 1 Tab by mouth every eight (8) hours as needed. Max Daily Amount: 150 mg. Indications: Pain  Qty: 30 Tab, Refills: 0    Associated Diagnoses: Chronic right shoulder pain; Chronic bilateral low back pain with left-sided sciatica      cholecalciferol, VITAMIN D3, (VITAMIN D3) 5,000 unit tab tablet Take 1 Tab by mouth daily. Qty: 90 Tab, Refills: 3    Associated Diagnoses: Hypovitaminosis D           2. Follow-up Information     Follow up With Details Comments Contact Info    [facility] EMERGENCY DEPT  If symptoms worsen [address]  [phone]        3. Return to ED if worse       DISCHARGE NOTE:  8:48 PM  Patient's results have been reviewed with them. Patient and/or family have verbally conveyed their understanding and agreement of the patient's signs, symptoms, diagnosis, treatment and prognosis and additionally agree to follow up as recommended or return to the Emergency Room should their condition change prior to follow-up. Discharge instructions have also been provided to the patient with some educational information regarding their diagnosis as well a list of reasons why they would want to return to the ER prior to their follow-up appointment should their condition change. Written by Josey Xavier ED Scribe, as dictated by Steve Castle MD.    Attestation: This note is prepared by Josey Xavier, acting as Scribe for Steve Castle MD.    Steve Castle MD: The scribe's documentation has been prepared under my direction and personally reviewed by me in its entirety. I confirm that the note above accurately reflects all work, treatment, procedures, and medical decision making performed by me.

## 2017-02-22 NOTE — ED NOTES
..Patient has been instructed that they have been given dilaudid, valium* which contains opioids, benzodiazepines, or other sedating drugs. Patient is aware that they  will need to refrain from driving or operating heavy machinery after taking this medication. Patient also instructed that they need to avoid drinking alcohol and using other products containing opioids, benzodiazepines, or other sedating drugs. Patient verbalized understanding.

## 2017-02-23 NOTE — DISCHARGE INSTRUCTIONS
Back Strain: Care Instructions  Your Care Instructions    Back strain happens when you overstretch, or pull, a muscle in your back. You may hurt your back in an accident or when you exercise or lift something. Most back pain will get better with rest and time. You can take care of yourself at home to help your back heal.  Follow-up care is a key part of your treatment and safety. Be sure to make and go to all appointments, and call your doctor if you are having problems. It's also a good idea to know your test results and keep a list of the medicines you take. How can you care for yourself at home? · Try to stay as active as you can, but stop or reduce any activity that causes pain. · Put ice or a cold pack on the sore muscle for 10 to 20 minutes at a time to stop swelling. Try this every 1 to 2 hours for 3 days (when you are awake) or until the swelling goes down. Put a thin cloth between the ice pack and your skin. · After 2 or 3 days, apply a heating pad on low or a warm cloth to your back. Some doctors suggest that you go back and forth between hot and cold treatments. · Take pain medicines exactly as directed. ¨ If the doctor gave you a prescription medicine for pain, take it as prescribed. ¨ If you are not taking a prescription pain medicine, ask your doctor if you can take an over-the-counter medicine. · Try sleeping on your side with a pillow between your legs. Or put a pillow under your knees when you lie on your back. These measures can ease pain in your lower back. · Return to your usual level of activity slowly. When should you call for help? Call 911 anytime you think you may need emergency care. For example, call if:  · You are unable to move a leg at all. Call your doctor now or seek immediate medical care if:  · You have new or worse symptoms in your legs, belly, or buttocks. Symptoms may include:  ¨ Numbness or tingling. ¨ Weakness. ¨ Pain.   · You lose bladder or bowel control. Watch closely for changes in your health, and be sure to contact your doctor if you are not getting better as expected. Where can you learn more? Go to http://dell-sherie.info/. Enter U361 in the search box to learn more about \"Back Strain: Care Instructions. \"  Current as of: May 23, 2016  Content Version: 11.1  © 9631-9344 [employer]. Care instructions adapted under license by [employer] (which disclaims liability or warranty for this information). If you have questions about a medical condition or this instruction, always ask your healthcare professional. [place] [address] any warranty or liability for your use of this information. Learning About Relief for Back Pain  What is back tension and strain? Back strain happens when you overstretch, or pull, a muscle in your back. You may hurt your back in an accident or when you exercise or lift something. Most back pain will get better with rest and time. You can take care of yourself at home to help your back heal.  What can you do first to relieve back pain? When you first feel back pain, try these steps:  · Walk. Take a short walk (10 to 20 minutes) on a level surface (no slopes, hills, or stairs) every 2 to 3 hours. Walk only distances you can manage without pain, especially leg pain. · Relax. Find a comfortable position for rest. Some people are comfortable on the floor or a medium-firm bed with a small pillow under their head and another under their knees. Some people prefer to lie on their side with a pillow between their knees. Don't stay in one position for too long. · Try heat or ice. Try using a heating pad on a low or medium setting, or take a warm shower, for 15 to 20 minutes every 2 to 3 hours. Or you can buy single-use heat wraps that last up to 8 hours. You can also try an ice pack for 10 to 15 minutes every 2 to 3 hours.  You can use an ice pack or a bag of frozen vegetables wrapped in a thin towel. There is not strong evidence that either heat or ice will help, but you can try them to see if they help. You may also want to try switching between heat and cold. · Take pain medicine exactly as directed. ¨ If the doctor gave you a prescription medicine for pain, take it as prescribed. ¨ If you are not taking a prescription pain medicine, ask your doctor if you can take an over-the-counter medicine. What else can you do? · Stretch and exercise. Exercises that increase flexibility may relieve your pain and make it easier for your muscles to keep your spine in a good, neutral position. And don't forget to keep walking. · Do self-massage. You can use self-massage to unwind after work or school or to energize yourself in the morning. You can easily massage your feet, hands, or neck. Self-massage works best if you are in comfortable clothes and are sitting or lying in a comfortable position. Use oil or lotion to massage bare skin. · Reduce stress. Back pain can lead to a vicious [place]: Distress about the pain tenses the muscles in your back, which in turn causes more pain. Learn how to relax your mind and your muscles to lower your stress. Where can you learn more? Go to http://dell-sherie.info/. Enter H532 in the search box to learn more about \"Learning About Relief for Back Pain. \"  Current as of: May 23, 2016  Content Version: 11.1  © 1729-4735 [employer]. Care instructions adapted under license by [employer] (which disclaims liability or warranty for this information). If you have questions about a medical condition or this instruction, always ask your healthcare professional. [place] [address] any warranty or liability for your use of this information.

## 2017-02-23 NOTE — ED NOTES
Patient given copy of dc instructions and 4 script(s). Patient verbalized understanding of instructions and script (s). Patient given a current medication reconciliation form and verbalized understanding of their medications. Patient  verbalized understanding of the importance of discussing medications with  his or her physician or clinic they will be following up with. Patient alert and oriented and in no acute distress. Patient discharged home ambulatory. Pt accepted WC to waiting room .

## 2017-02-23 NOTE — ED NOTES
Bedside and Verbal shift change report given to me (oncoming nurse) by Yokasta Hansen RN (offgoing nurse). Report included the following information SBAR, Kardex, ED Summary, OR Summary, Intake/Output and MAR.

## 2017-02-24 ENCOUNTER — TELEPHONE (OUTPATIENT)
Dept: FAMILY MEDICINE CLINIC | Age: 37
End: 2017-02-24

## 2017-02-24 NOTE — TELEPHONE ENCOUNTER
Pt is at [facility] - Tramadol is not there for him   Please give him a call as soon as possible       Best number to reach [phone]

## 2017-02-24 NOTE — TELEPHONE ENCOUNTER
pulled patient received meds from 4 different providers from 3 different pharmacies in the past 3 months

## 2017-05-12 DIAGNOSIS — G89.29 CHRONIC BILATERAL LOW BACK PAIN WITH LEFT-SIDED SCIATICA: ICD-10-CM

## 2017-05-12 DIAGNOSIS — G89.29 CHRONIC RIGHT SHOULDER PAIN: ICD-10-CM

## 2017-05-12 DIAGNOSIS — H83.03 INFECTION OF THE INNER EAR, BILATERAL: ICD-10-CM

## 2017-05-12 DIAGNOSIS — M54.42 CHRONIC BILATERAL LOW BACK PAIN WITH LEFT-SIDED SCIATICA: ICD-10-CM

## 2017-05-12 DIAGNOSIS — M25.511 CHRONIC RIGHT SHOULDER PAIN: ICD-10-CM

## 2017-05-15 RX ORDER — GABAPENTIN 300 MG/1
CAPSULE ORAL
Qty: 90 CAP | Refills: 2 | Status: SHIPPED | OUTPATIENT
Start: 2017-05-15 | End: 2017-06-15 | Stop reason: SDUPTHER

## 2017-05-22 ENCOUNTER — HOSPITAL ENCOUNTER (EMERGENCY)
Age: 37
Discharge: HOME | End: 2017-05-22
Attending: EMERGENCY MEDICINE | Admitting: EMERGENCY MEDICINE
Payer: COMMERCIAL

## 2017-05-22 VITALS
SYSTOLIC BLOOD PRESSURE: 113 MMHG | OXYGEN SATURATION: 97 % | RESPIRATION RATE: 16 BRPM | HEART RATE: 76 BPM | DIASTOLIC BLOOD PRESSURE: 69 MMHG

## 2017-05-22 DIAGNOSIS — M54.41 ACUTE RIGHT-SIDED LOW BACK PAIN WITH RIGHT-SIDED SCIATICA: Primary | ICD-10-CM

## 2017-05-22 PROCEDURE — 96374 THER/PROPH/DIAG INJ IV PUSH: CPT

## 2017-05-22 PROCEDURE — 99282 EMERGENCY DEPT VISIT SF MDM: CPT

## 2017-05-22 PROCEDURE — 96375 TX/PRO/DX INJ NEW DRUG ADDON: CPT

## 2017-05-22 PROCEDURE — 74011250636 HC RX REV CODE- 250/636: Performed by: EMERGENCY MEDICINE

## 2017-05-22 PROCEDURE — 96376 TX/PRO/DX INJ SAME DRUG ADON: CPT

## 2017-05-22 RX ORDER — DIAZEPAM 5 MG/1
5 TABLET ORAL
Qty: 15 TAB | Refills: 0 | Status: SHIPPED | OUTPATIENT
Start: 2017-05-22 | End: 2017-07-06 | Stop reason: ALTCHOICE

## 2017-05-22 RX ORDER — HYDROMORPHONE HYDROCHLORIDE 1 MG/ML
1 INJECTION, SOLUTION INTRAMUSCULAR; INTRAVENOUS; SUBCUTANEOUS
Status: COMPLETED | OUTPATIENT
Start: 2017-05-22 | End: 2017-05-22

## 2017-05-22 RX ORDER — DIAZEPAM 10 MG/2ML
2 INJECTION INTRAMUSCULAR
Status: COMPLETED | OUTPATIENT
Start: 2017-05-22 | End: 2017-05-22

## 2017-05-22 RX ORDER — HYDROCODONE BITARTRATE AND ACETAMINOPHEN 7.5; 325 MG/1; MG/1
1 TABLET ORAL
Qty: 20 TAB | Refills: 0 | Status: SHIPPED | OUTPATIENT
Start: 2017-05-22 | End: 2017-05-28

## 2017-05-22 RX ORDER — DIAZEPAM 10 MG/2ML
5 INJECTION INTRAMUSCULAR
Status: COMPLETED | OUTPATIENT
Start: 2017-05-22 | End: 2017-05-22

## 2017-05-22 RX ORDER — METHYLPREDNISOLONE 4 MG/1
TABLET ORAL
Qty: 1 DOSE PACK | Refills: 0 | Status: SHIPPED | OUTPATIENT
Start: 2017-05-22 | End: 2017-05-28 | Stop reason: CLARIF

## 2017-05-22 RX ORDER — OXYCODONE AND ACETAMINOPHEN 5; 325 MG/1; MG/1
1 TABLET ORAL
Qty: 20 TAB | Refills: 0 | Status: SHIPPED | OUTPATIENT
Start: 2017-05-22 | End: 2017-05-28 | Stop reason: CLARIF

## 2017-05-22 RX ADMIN — DIAZEPAM 5 MG: 5 INJECTION, SOLUTION INTRAMUSCULAR; INTRAVENOUS at 12:04

## 2017-05-22 RX ADMIN — HYDROMORPHONE HYDROCHLORIDE 1 MG: 1 INJECTION, SOLUTION INTRAMUSCULAR; INTRAVENOUS; SUBCUTANEOUS at 14:06

## 2017-05-22 RX ADMIN — DIAZEPAM 2 MG: 5 INJECTION, SOLUTION INTRAMUSCULAR; INTRAVENOUS at 14:05

## 2017-05-22 RX ADMIN — HYDROMORPHONE HYDROCHLORIDE 1 MG: 1 INJECTION, SOLUTION INTRAMUSCULAR; INTRAVENOUS; SUBCUTANEOUS at 12:04

## 2017-05-22 RX ADMIN — METHYLPREDNISOLONE SODIUM SUCCINATE 125 MG: 125 INJECTION, POWDER, FOR SOLUTION INTRAMUSCULAR; INTRAVENOUS at 14:05

## 2017-05-22 NOTE — ED NOTES
Patient to ED room with complaint of acute on chronic lower back pain. Patient reports pain \"comes in spasms\".

## 2017-05-22 NOTE — DISCHARGE INSTRUCTIONS
Learning About Relief for Back Pain  What is back tension and strain? Back strain happens when you overstretch, or pull, a muscle in your back. You may hurt your back in an accident or when you exercise or lift something. Most back pain will get better with rest and time. You can take care of yourself at home to help your back heal.  What can you do first to relieve back pain? When you first feel back pain, try these steps:  · Walk. Take a short walk (10 to 20 minutes) on a level surface (no slopes, hills, or stairs) every 2 to 3 hours. Walk only distances you can manage without pain, especially leg pain. · Relax. Find a comfortable position for rest. Some people are comfortable on the floor or a medium-firm bed with a small pillow under their head and another under their knees. Some people prefer to lie on their side with a pillow between their knees. Don't stay in one position for too long. · Try heat or ice. Try using a heating pad on a low or medium setting, or take a warm shower, for 15 to 20 minutes every 2 to 3 hours. Or you can buy single-use heat wraps that last up to 8 hours. You can also try an ice pack for 10 to 15 minutes every 2 to 3 hours. You can use an ice pack or a bag of frozen vegetables wrapped in a thin towel. There is not strong evidence that either heat or ice will help, but you can try them to see if they help. You may also want to try switching between heat and cold. · Take pain medicine exactly as directed. ¨ If the doctor gave you a prescription medicine for pain, take it as prescribed. ¨ If you are not taking a prescription pain medicine, ask your doctor if you can take an over-the-counter medicine. What else can you do? · Stretch and exercise. Exercises that increase flexibility may relieve your pain and make it easier for your muscles to keep your spine in a good, neutral position. And don't forget to keep walking. · Do self-massage.  You can use self-massage to unwind after work or school or to energize yourself in the morning. You can easily massage your feet, hands, or neck. Self-massage works best if you are in comfortable clothes and are sitting or lying in a comfortable position. Use oil or lotion to massage bare skin. · Reduce stress. Back pain can lead to a vicious [place]: Distress about the pain tenses the muscles in your back, which in turn causes more pain. Learn how to relax your mind and your muscles to lower your stress. Where can you learn more? Go to http://dell-sherie.info/. Enter B735 in the search box to learn more about \"Learning About Relief for Back Pain. \"  Current as of: May 23, 2016  Content Version: 11.2  © 3395-0233 [employer]. Care instructions adapted under license by [employer] (which disclaims liability or warranty for this information). If you have questions about a medical condition or this instruction, always ask your healthcare professional. [place] [address] any warranty or liability for your use of this information.

## 2017-05-22 NOTE — ED PROVIDER NOTES
HPI Comments: Lamin Tello., 43 y.o. Male with PMHx significant for chronic low back pain and a lumbar fusion, presents ambulatory to ED [facility] ED with cc of acute exacerbation of his chronic low back pain with associated nausea that started PTA. The patient describes his pain as a sharp pain that radiates down his right leg. Per spouse, the patient's pain onset at the patient's Pain Management office after dorsiflexion of his toes and was referred to the ED for further pain control. Per spouse, the patient received a dose of Toradol in the Pain Management office. Per spouse, the patient has an epidural scheduled in 2-3 days, and he has a history of an epidural in the past with moderate relief. The patient denies fevers, chills, vomiting, saddle anesthesia, or urinary/fecal incontinence. PCP: Laxmi Rivas MD    Social history significant for: - Tobacco (former), + EtOH, - Illicit Drug Use    There are no other complaints, changes, or physical findings at this time. Written by INOCENTE Calvo, as dictated by Mellissa Ramirez MD.    The history is provided by the patient and the spouse. No  was used.         Past Medical History:   Diagnosis Date    Back pain     Chronic pain     back    Concussion     H/O spinal fusion     15 years ago    Hypertension        Past Surgical History:   Procedure Laterality Date    HX ORTHOPAEDIC      Spinal fusion L5 S1 in 2003         Family History:   Problem Relation Age of Onset    Diabetes Mother     Hypertension Mother     Thyroid Disease Mother      hypothyroidism    Heart Attack Mother      age 64-smoker    [employer] Arthritis-osteo Mother     Asthma Mother     Heart Disease Mother     Elevated Lipids Father     Heart Disease Father     Hypertension Father     Diabetes Father     Cancer Father      prostate    Heart Attack Father      in 54's    Alcohol abuse Father     Arthritis-osteo Father     Psychiatric Disorder Father Social History     Social History    Marital status:      Spouse name: N/A    Number of children: N/A    Years of education: N/A     Occupational History    Not on file. Social History Main Topics    Smoking status: Former Smoker     Packs/day: 1.00     Years: 10.00     Types: Cigarettes     Quit date: 4/8/2014    Smokeless tobacco: Never Used    Alcohol use Yes    Drug use: No    Sexual activity: Yes     Partners: Female     Birth control/ protection: None     Other Topics Concern    Not on file     Social History Narrative         ALLERGIES: Review of patient's allergies indicates no known allergies. Review of Systems   Constitutional: Negative for chills, diaphoresis, fever and unexpected weight change. HENT: Negative for rhinorrhea and sore throat. Eyes: Negative for pain. Respiratory: Negative for shortness of breath, wheezing and stridor. Cardiovascular: Negative for chest pain and leg swelling. Gastrointestinal: Positive for nausea. Negative for abdominal pain, blood in stool, diarrhea and vomiting. Genitourinary: Negative for difficulty urinating, dysuria and flank pain. (-) Urinary/fecal incontinence   Musculoskeletal: Positive for back pain. Negative for neck stiffness. Skin: Negative for rash. Neurological: Negative for seizures, syncope, weakness, light-headedness, numbness (Saddle anesthesia) and headaches. Psychiatric/Behavioral: Negative for confusion. Vitals:    05/22/17 1145 05/22/17 1200 05/22/17 1207 05/22/17 1215   BP: 113/57 135/57 128/66 130/60            Physical Exam   Constitutional: He is oriented to person, place, and time. He appears well-developed and well-nourished. He appears distressed (Moderately). Thrashing about in the bed   HENT:   Nose: Nose normal.   Mouth/Throat: Oropharynx is clear and moist. No oropharyngeal exudate. Eyes: Conjunctivae and EOM are normal. Pupils are equal, round, and reactive to light.  Right eye exhibits no discharge. Left eye exhibits no discharge. No scleral icterus. Neck: Normal range of motion. Neck supple. No JVD present. Cardiovascular: Normal rate, regular rhythm, normal heart sounds and intact distal pulses. No murmur heard. Pulmonary/Chest: Effort normal and breath sounds normal. No stridor. No respiratory distress. He has no wheezes. He has no rales. Abdominal: Soft. Bowel sounds are normal. He exhibits no distension. There is no tenderness. There is no rebound and no guarding. Musculoskeletal: He exhibits no edema or tenderness. Tender to light touch and palpation to right para lumbar region   Neurological: He is alert and oriented to person, place, and time. Skin: Skin is warm and dry. He is not diaphoretic. Psychiatric: He has a normal mood and affect. Nursing note and vitals reviewed. Written by INOCENTE Britt, as dictated by Keely Villalba MD.    MDM  Number of Diagnoses or Management Options  Acute right-sided low back pain with right-sided sciatica:   Diagnosis management comments: Acute exacerbation of his chronic low back pain. Pt is neurologically intact, no red flag symptoms. Symptoms improved with treatment in the ED. Stable for discharge. Amount and/or Complexity of Data Reviewed  Obtain history from someone other than the patient: yes (Spouse)  Review and summarize past medical records: yes    Patient Progress  Patient progress: stable    ED Course       Procedures    Progress Note:  2:44 PM  The patient was re-evaluated and states that his pain is a 4/10. He is resting comfortably at this time. Written by INOCENTE Britt, as dictated by Keely Villalba MD.    Progress Note:  3:20 PM  The patient was re-evaluated and is ready for discharge.   Written by INOCENTE Britt, as dictated by Keely Villalba MD.    MEDICATIONS GIVEN:  Medications   HYDROmorphone (PF) (DILAUDID) injection 1 mg (1 mg IntraVENous Given 5/22/17 1204) diazePAM (VALIUM) injection 5 mg (5 mg IntraVENous Given 5/22/17 1204)   HYDROmorphone (PF) (DILAUDID) injection 1 mg (1 mg IntraVENous Given 5/22/17 1406)   diazePAM (VALIUM) injection 2 mg (2 mg IntraVENous Given 5/22/17 1405)   methylPREDNISolone (PF) (SOLU-MEDROL) injection 125 mg (125 mg IntraVENous Given 5/22/17 1405)       IMPRESSION:  1. Acute right-sided low back pain with right-sided sciatica        PLAN:  1. Current Discharge Medication List      START taking these medications    Details   methylPREDNISolone (MEDROL, YOON,) 4 mg tablet Take as directed  Qty: 1 Dose Pack, Refills: 0      diazePAM (VALIUM) 5 mg tablet Take 1 Tab by mouth every eight (8) hours as needed (spasm). Max Daily Amount: 15 mg.  Qty: 15 Tab, Refills: 0      oxyCODONE-acetaminophen (PERCOCET) 5-325 mg per tablet Take 1 Tab by mouth every four (4) hours as needed for Pain. Max Daily Amount: 6 Tabs. Qty: 20 Tab, Refills: 0           2. Follow-up Information     Follow up With Details Comments Contact Info    Elias Shankar MD Call in 2 days  [address]. [phone]      [facility] EMERGENCY DEPT  As needed, If symptoms worsen [address] [place] [address]  [phone]        Return to ED if worse     Discharge Note:  3:20 PM  The pt is ready for discharge. The pt's signs, symptoms, diagnosis, and discharge instructions have been discussed and pt has conveyed their understanding. The pt is to follow up as recommended or return to ER should their symptoms worsen. Plan has been discussed and pt is in agreement. This note is prepared by Melita Villalta, acting as a Scribe for Jorge Estrada MD.    Jorge Estrada MD: The scribe's documentation has been prepared under my direction and personally reviewed by me in its entirety. I confirm that the notes above accurately reflects all work, treatment, procedures, and medical decision making performed by me.

## 2017-05-22 NOTE — ED NOTES
Dr Roxanna Wyatt reviewed discharge instructions with the patient and spouse. The patient and spouse verbalized understanding. Pt AAOx4, respirations unlabored and regular, skin WDL. Steady gait noted.

## 2017-05-28 ENCOUNTER — APPOINTMENT (OUTPATIENT)
Dept: GENERAL RADIOLOGY | Age: 37
End: 2017-05-28
Attending: PHYSICIAN ASSISTANT
Payer: COMMERCIAL

## 2017-05-28 ENCOUNTER — HOSPITAL ENCOUNTER (EMERGENCY)
Age: 37
Discharge: HOME | End: 2017-05-28
Attending: EMERGENCY MEDICINE
Payer: COMMERCIAL

## 2017-05-28 VITALS
HEART RATE: 71 BPM | RESPIRATION RATE: 17 BRPM | HEIGHT: 73 IN | SYSTOLIC BLOOD PRESSURE: 144 MMHG | DIASTOLIC BLOOD PRESSURE: 75 MMHG | BODY MASS INDEX: 31.81 KG/M2 | TEMPERATURE: 98 F | WEIGHT: 240 LBS | OXYGEN SATURATION: 99 %

## 2017-05-28 DIAGNOSIS — S61.209A FINGER AVULSION, INITIAL ENCOUNTER: Primary | ICD-10-CM

## 2017-05-28 PROCEDURE — 73140 X-RAY EXAM OF FINGER(S): CPT

## 2017-05-28 PROCEDURE — 74011250637 HC RX REV CODE- 250/637: Performed by: PHYSICIAN ASSISTANT

## 2017-05-28 PROCEDURE — 99283 EMERGENCY DEPT VISIT LOW MDM: CPT

## 2017-05-28 RX ORDER — HYDROCODONE BITARTRATE AND ACETAMINOPHEN 5; 325 MG/1; MG/1
1 TABLET ORAL
Status: DISCONTINUED | OUTPATIENT
Start: 2017-05-28 | End: 2017-05-28 | Stop reason: HOSPADM

## 2017-05-28 RX ORDER — CEPHALEXIN 500 MG/1
500 CAPSULE ORAL 4 TIMES DAILY
Qty: 20 CAP | Refills: 0 | Status: SHIPPED | OUTPATIENT
Start: 2017-05-28 | End: 2017-06-02

## 2017-05-28 RX ORDER — IBUPROFEN 800 MG/1
800 TABLET ORAL
Qty: 20 TAB | Refills: 0 | Status: SHIPPED | OUTPATIENT
Start: 2017-05-28 | End: 2017-06-04

## 2017-05-28 RX ORDER — HYDROCODONE BITARTRATE AND ACETAMINOPHEN 5; 325 MG/1; MG/1
1 TABLET ORAL
Qty: 6 TAB | Refills: 0 | Status: SHIPPED | OUTPATIENT
Start: 2017-05-28 | End: 2017-07-06 | Stop reason: ALTCHOICE

## 2017-05-28 RX ADMIN — NEOMYCIN-BACITRACIN-POLYMYXIN OINT 1 PACKET: OINTMENT at 18:02

## 2017-05-28 NOTE — DISCHARGE INSTRUCTIONS
Toenail or Fingernail Avulsion: Care Instructions  Your Care Instructions  Losing a toenail or fingernail because of an injury is called avulsion. The nail may be completely or partially torn off after a trauma to the area. Your doctor may have removed the nail, put part of it back into place, or repaired the nail bed. Your toe or finger may be sore after treatment. You may have stitches. You may have some swelling, color changes, and bloody crusting on or around the wound for 2 or 3 days. This is normal. Taking good care of your wound at home will help it heal quickly and reduce your chance of infection. The wound should heal within a few weeks. If completely removed, fingernails may take 6 months to grow back. Toenails may take 12 to 18 months to grow back. Injured nails may look different when they grow back. Follow-up care is a key part of your treatment and safety. Be sure to make and go to all appointments, and call your doctor if you are having problems. It's also a good idea to know your test results and keep a list of the medicines you take. How can you care for yourself at home? · If possible, prop up the injured area on a pillow anytime you sit or lie down during the next 3 days. Try to keep it above the level of your heart. This will help reduce swelling. · Leave the bandage on, and if you have stitches, do not get them wet for the first 24 to 48 hours. Use a plastic bag to cover the area when you shower. · If your doctor told you how to care for your wound, follow your doctor's instructions. If you did not get instructions, follow this general advice:  ¨ After the first 24 to 48 hours, you can remove the bandage and gently wash around the wound with clean water 2 times a day. If the bandage sticks to the wound, use warm water to loosen it. Do not scrub or soak the area. ¨ You may cover the wound with a thin layer of petroleum jelly, such as Vaseline, and a nonstick bandage.   ¨ Apply more petroleum jelly and replace the bandage as needed. · Do not go swimming. · If you have stitches, do not remove them on your own. Your doctor will tell you when to return to have the stitches removed. · Be safe with medicines. Take pain medicines exactly as directed. ¨ If the doctor gave you a prescription medicine for pain, take it as prescribed. ¨ If you are not taking a prescription pain medicine, ask your doctor if you can take an over-the-counter medicine. · If your doctor prescribed antibiotics, take them as directed. Do not stop taking them just because you feel better. You need to take the full course of antibiotics. When should you call for help? Call your doctor now or seek immediate medical care if:  · The skin near the wound is cool or pale or changes color. · The wound starts to bleed, and blood soaks through the bandage. Oozing small amounts of blood is normal.  · You have signs of infection, such as:  ¨ Increased pain, swelling, warmth, or redness. ¨ Red streaks leading from your toe or finger. ¨ Pus draining from your toe or finger. ¨ Swollen lymph nodes in your neck, armpits, or groin. ¨ A fever. Watch closely for changes in your health, and be sure to contact your doctor if:  · You have problems with the nail as it grows back. · You do not get better as expected. Where can you learn more? Go to http://dell-sherie.info/. Enter U660 in the search box to learn more about \"Toenail or Fingernail Avulsion: Care Instructions. \"  Current as of: October 13, 2016  Content Version: 11.2  © 6348-8191 [employer]. Care instructions adapted under license by [employer] (which disclaims liability or warranty for this information). If you have questions about a medical condition or this instruction, always ask your healthcare professional. [address] any warranty or liability for your use of this information.

## 2017-05-28 NOTE — ED NOTES
Reviewed discharge instructions, follow up information and prescriptions with patient. Patient's mother in wheelchair; escorted patient's family member to ED waiting area via wheelchair. Patient declined work excuse. Ambulatory independently out of ED. Discharged home.

## 2017-05-28 NOTE — ED PROVIDER NOTES
Patient is a 42 y.o. male presenting with hand injury. The history is provided by the patient. Hand Injury    This is a new (Pt reports using a mandolin to prepare food when he cut to distal end of his Rt 2nd finger; 30 min PTA. Tdap UTD.) problem. The current episode started less than 1 hour ago. The problem occurs constantly. The problem has been gradually improving (Bleeding controlled. ). The pain is present in the right fingers. The pain is at a severity of 10/10. Pertinent negatives include no numbness, full range of motion, no stiffness, no tingling, no itching, no back pain and no neck pain. The symptoms are aggravated by movement and contact. He has tried nothing for the symptoms. There has been a history of trauma. Past Medical History:   Diagnosis Date    Back pain     Chronic pain     back    Concussion     H/O spinal fusion     15 years ago    Hypertension        Past Surgical History:   Procedure Laterality Date    HX ORTHOPAEDIC      Spinal fusion L5 S1 in 2003         Family History:   Problem Relation Age of Onset    Diabetes Mother     Hypertension Mother     Thyroid Disease Mother      hypothyroidism    Heart Attack Mother      age 64-smoker    [address] Arthritis-osteo Mother     Asthma Mother     Heart Disease Mother     Elevated Lipids Father     Heart Disease Father     Hypertension Father     Diabetes Father     Cancer Father      prostate    Heart Attack Father      in 52's    Alcohol abuse Father     Arthritis-osteo Father     Psychiatric Disorder Father        Social History     Social History    Marital status:      Spouse name: N/A    Number of children: N/A    Years of education: N/A     Occupational History    Not on file.      Social History Main Topics    Smoking status: Former Smoker     Packs/day: 1.00     Years: 10.00     Types: Cigarettes     Quit date: 4/8/2014    Smokeless tobacco: Never Used    Alcohol use Yes    Drug use: No    Sexual activity: Yes     Partners: Female     Birth control/ protection: None     Other Topics Concern    Not on file     Social History Narrative         ALLERGIES: Review of patient's allergies indicates no known allergies. Review of Systems   Constitutional: Negative for activity change, chills, diaphoresis and fever. HENT: Negative for ear discharge, facial swelling, nosebleeds, postnasal drip, rhinorrhea, trouble swallowing and voice change. Eyes: Negative for photophobia, pain and visual disturbance. Respiratory: Negative for apnea, cough and shortness of breath. Cardiovascular: Negative for chest pain and palpitations. Gastrointestinal: Negative for abdominal pain, diarrhea, nausea and vomiting. Genitourinary: Negative for decreased urine volume, difficulty urinating and hematuria. Musculoskeletal: Positive for arthralgias. Negative for back pain, gait problem, joint swelling, neck pain, neck stiffness and stiffness. Skin: Positive for wound. Negative for color change, itching, pallor and rash. Neurological: Negative for dizziness, tingling, facial asymmetry, speech difficulty, weakness, numbness and headaches. Psychiatric/Behavioral: Negative. Vitals:    05/28/17 1718   BP: 144/75   Pulse: 71   Resp: 17   Temp: 98 °F (36.7 °C)   SpO2: 99%            Physical Exam   Constitutional: He is oriented to person, place, and time. He appears well-developed and well-nourished. No distress. HENT:   Head: Normocephalic and atraumatic. Right Ear: Hearing and external ear normal.   Left Ear: Hearing and external ear normal.   Nose: Nose normal.   Eyes: Conjunctivae and EOM are normal. Pupils are equal, round, and reactive to light. Neck: Normal range of motion. Neck supple. Pulmonary/Chest: Effort normal. No accessory muscle usage. No respiratory distress. Musculoskeletal:        Right hand: He exhibits tenderness and laceration. He exhibits normal range of motion and no swelling. Normal sensation noted. Normal strength noted. Hands:  Neurological: He is alert and oriented to person, place, and time. No cranial nerve deficit. Skin: Skin is warm, dry and intact. He is not diaphoretic. No pallor. <1cm avulsion to distal Rt 2nd digit including fraction of distal nail. FROM. Bleeding controlled. Psychiatric: He has a normal mood and affect. His speech is normal and behavior is normal. Judgment and thought content normal.   Nursing note and vitals reviewed. MDM  Number of Diagnoses or Management Options  Finger avulsion, initial encounter:   Diagnosis management comments: DDx: avulsion, fx, laceration, cellulitis    LABORATORY TESTS:  No results found for this or any previous visit (from the past 12 hour(s)). IMAGING RESULTS:  XR 2ND FINGER RT MIN 2 V   Final Result   FINDINGS: Three views of the right second finger demonstrate no fracture or  other acute osseous or articular abnormality. The soft tissues are within  normal limits.     IMPRESSION  IMPRESSION:   1. No visible abnormality. MEDICATIONS GIVEN:  Medications  HYDROcodone-acetaminophen (NORCO) 5-325 mg per tablet 1 Tab (not administered)  neomycin-bacitracnZn-polymyxnB (NEOSPORIN) ointment 1 Packet (1 Packet Topical Given 5/28/17 1802)    IMPRESSION:  Finger avulsion, initial encounter  (primary encounter diagnosis)    PLAN:  1. Current Discharge Medication List    START taking these medications    HYDROcodone-acetaminophen (NORCO) 5-325 mg per tablet  Take 1 Tab by mouth every four (4) hours as needed for Pain. Max Daily Amount: 6 Tabs. Qty: 6 Tab Refills: 0    cephALEXin (KEFLEX) 500 mg capsule  Take 1 Cap by mouth four (4) times daily for 5 days. Qty: 20 Cap Refills: 0    ibuprofen (MOTRIN) 800 mg tablet  Take 1 Tab by mouth every six (6) hours as needed for Pain for up to 7 days.    Qty: 20 Tab Refills: 0      CONTINUE these medications which have NOT CHANGED    TRAZODONE HCL (TRAZODONE PO)  Take  by mouth.    diazePAM (VALIUM) 5 mg tablet  Take 1 Tab by mouth every eight (8) hours as needed (spasm). Max Daily Amount: 15 mg.  Qty: 15 Tab Refills: 0    gabapentin (NEURONTIN) 300 mg capsule  TAKE ONE CAPSULE BY MOUTH 3 TIMES A DAY  Qty: 90 Cap Refills: 2  Associated Diagnoses:Chronic bilateral low back pain with left-sided sciatica; Infection of the inner ear, bilateral; Chronic right shoulder pain    methocarbamol (ROBAXIN-750) 750 mg tablet  Take 1 Tab by mouth four (4) times daily as needed. Indications: MUSCLE SPASM  Qty: 16 Tab Refills: 0    cholecalciferol, VITAMIN D3, (VITAMIN D3) 5,000 unit tab tablet  Take 1 Tab by mouth daily. Qty: 90 Tab Refills: 3  Associated Diagnoses:Hypovitaminosis D    HYDROmorphone (DILAUDID) 2 mg tablet  Take 1 Tab by mouth every eight (8) hours as needed for Pain. Max Daily Amount: 6 mg. Qty: 15 Tab Refills: 0      STOP taking these medications    HYDROcodone-acetaminophen (NORCO) 7.5-325 mg per tablet  Comments:  Reason for Stoppin. Follow-up Information     Follow up With Details Comments Contact Info    Abby Still MD Schedule an appointment as soon as possible for a visit   in 1 week As needed, If symptoms worsen [address] [place] [address]  [phone]        Return to ED if worse                  Amount and/or Complexity of Data Reviewed  Tests in the radiology section of CPT®: ordered and reviewed  Tests in the medicine section of CPT®: ordered and reviewed    Patient Progress  Patient progress: stable    ED Course       Procedures    6:41 PM  I have discussed with patient their diagnosis, treatment, and follow up plan. The patient agrees to follow up as outlined in discharge paperwork and also to return to the ED with any worsening.  Michael Soliz PA-C

## 2017-05-28 NOTE — ED NOTES
Patient presents with c/o right hand, second digit pain and avulsion injury. Bleeding to site controlled with dressing applied. Emergency Department Nursing Plan of Care       The Nursing Plan of Care is developed from the Nursing assessment and Emergency Department Attending provider initial evaluation. The plan of care may be reviewed in the ED Provider note.     The Plan of Care was developed with the following considerations:   Patient / Family readiness to learn indicated by:verbalized understanding  Persons(s) to be included in education: patient  Barriers to Learning/Limitations:No    Signed     Josué Lopez RN    5/28/2017   5:51 PM

## 2017-05-28 NOTE — ED NOTES
Patient has been instructed that they have been given Lortab* which contains opioids, benzodiazepines, or other sedating drugs. Patient is aware that they  will need to refrain from driving or operating heavy machinery after taking this medication. Patient also instructed that they need to avoid drinking alcohol and using other products containing opioids, benzodiazepines, or other sedating drugs. Patient verbalized understanding.

## 2017-05-28 NOTE — ED NOTES
Right 2nd digit cleaned and telfa/gauze drsg applied. No si/s of acute distress. Call bell within reach.

## 2017-05-28 NOTE — ED TRIAGE NOTES
Patient presents with c/o right  Hand 2nd digit injury sustained this afternoon while using Mandolin (slicer) to prepare food. tdap up to date.

## 2017-06-15 DIAGNOSIS — M54.42 CHRONIC BILATERAL LOW BACK PAIN WITH LEFT-SIDED SCIATICA: ICD-10-CM

## 2017-06-15 DIAGNOSIS — G89.29 CHRONIC RIGHT SHOULDER PAIN: ICD-10-CM

## 2017-06-15 DIAGNOSIS — M25.511 CHRONIC RIGHT SHOULDER PAIN: ICD-10-CM

## 2017-06-15 DIAGNOSIS — G89.29 CHRONIC BILATERAL LOW BACK PAIN WITH LEFT-SIDED SCIATICA: ICD-10-CM

## 2017-06-15 DIAGNOSIS — H83.03 INFECTION OF THE INNER EAR, BILATERAL: ICD-10-CM

## 2017-06-16 NOTE — TELEPHONE ENCOUNTER
From: Eva Agudelo. To: Jenine Snellen, MD  Sent: 6/15/2017 3:28 PM EDT  Subject: Medication Renewal Request    Original authorizing provider: Jenine Snellen, MD Emilee Fillers. would like a refill of the following medications:  gabapentin (NEURONTIN) 300 mg capsule Jenine Snellen, MD]    Preferred pharmacy: [address]     Comment:

## 2017-06-19 RX ORDER — GABAPENTIN 300 MG/1
300 CAPSULE ORAL 3 TIMES DAILY
Qty: 90 CAP | Refills: 2 | Status: SHIPPED | OUTPATIENT
Start: 2017-06-19 | End: 2017-07-06 | Stop reason: ALTCHOICE

## 2017-07-06 ENCOUNTER — OFFICE VISIT (OUTPATIENT)
Dept: INTERNAL MEDICINE CLINIC | Age: 37
End: 2017-07-06

## 2017-07-06 VITALS
RESPIRATION RATE: 18 BRPM | OXYGEN SATURATION: 99 % | TEMPERATURE: 98 F | HEIGHT: 73 IN | SYSTOLIC BLOOD PRESSURE: 131 MMHG | WEIGHT: 242 LBS | HEART RATE: 64 BPM | BODY MASS INDEX: 32.07 KG/M2 | DIASTOLIC BLOOD PRESSURE: 85 MMHG

## 2017-07-06 DIAGNOSIS — Z00.00 GENERAL MEDICAL EXAM: ICD-10-CM

## 2017-07-06 DIAGNOSIS — M25.511 CHRONIC RIGHT SHOULDER PAIN: ICD-10-CM

## 2017-07-06 DIAGNOSIS — G89.29 CHRONIC RIGHT SHOULDER PAIN: ICD-10-CM

## 2017-07-06 DIAGNOSIS — M54.41 CHRONIC MIDLINE LOW BACK PAIN WITH BILATERAL SCIATICA: Primary | ICD-10-CM

## 2017-07-06 DIAGNOSIS — R79.89 LOW VITAMIN D LEVEL: ICD-10-CM

## 2017-07-06 DIAGNOSIS — M54.42 CHRONIC MIDLINE LOW BACK PAIN WITH BILATERAL SCIATICA: Primary | ICD-10-CM

## 2017-07-06 DIAGNOSIS — M25.511 ACUTE PAIN OF RIGHT SHOULDER: ICD-10-CM

## 2017-07-06 DIAGNOSIS — G89.29 CHRONIC MIDLINE LOW BACK PAIN WITH BILATERAL SCIATICA: Primary | ICD-10-CM

## 2017-07-06 RX ORDER — HYDROCODONE BITARTRATE AND ACETAMINOPHEN 10; 325 MG/1; MG/1
TABLET ORAL
COMMUNITY
Start: 2017-05-06 | End: 2017-07-06 | Stop reason: ALTCHOICE

## 2017-07-06 RX ORDER — LORAZEPAM 0.5 MG/1
TABLET ORAL
Refills: 0 | COMMUNITY
Start: 2017-05-24 | End: 2017-07-06 | Stop reason: ALTCHOICE

## 2017-07-06 RX ORDER — DICLOFENAC SODIUM 10 MG/G
4 GEL TOPICAL 4 TIMES DAILY
Qty: 1 EACH | Refills: 5 | Status: SHIPPED | OUTPATIENT
Start: 2017-07-06 | End: 2018-07-15 | Stop reason: SDUPTHER

## 2017-07-06 RX ORDER — TRAZODONE HYDROCHLORIDE 50 MG/1
TABLET ORAL
Refills: 2 | COMMUNITY
Start: 2017-05-22 | End: 2017-07-29

## 2017-07-06 RX ORDER — TRAMADOL HYDROCHLORIDE 50 MG/1
50 TABLET ORAL
Qty: 90 TAB | Refills: 1 | Status: SHIPPED | OUTPATIENT
Start: 2017-07-06 | End: 2017-09-01 | Stop reason: ALTCHOICE

## 2017-07-06 RX ORDER — HYDROCODONE BITARTRATE AND ACETAMINOPHEN 7.5; 325 MG/1; MG/1
TABLET ORAL
Refills: 0 | COMMUNITY
Start: 2017-05-22 | End: 2017-07-06 | Stop reason: ALTCHOICE

## 2017-07-06 RX ORDER — CARISOPRODOL 350 MG/1
TABLET ORAL
COMMUNITY
Start: 2017-05-06 | End: 2017-07-06 | Stop reason: ALTCHOICE

## 2017-07-06 RX ORDER — GABAPENTIN 300 MG/1
900 CAPSULE ORAL 2 TIMES DAILY
Qty: 180 CAP | Refills: 3 | Status: SHIPPED | OUTPATIENT
Start: 2017-07-06 | End: 2017-10-25 | Stop reason: SDUPTHER

## 2017-07-06 RX ORDER — METHYLPREDNISOLONE 4 MG/1
TABLET ORAL
Refills: 0 | COMMUNITY
Start: 2017-05-22 | End: 2017-07-06 | Stop reason: ALTCHOICE

## 2017-07-06 NOTE — PROGRESS NOTES
Chief Complaint   Patient presents with   Teetee Castillo [facility]     new patient    Shoulder Pain     right should pain 10 pain. Pain has been there since Feb     Back Pain     had for many years     1. Have you been to the ER, urgent care clinic since your last visit? Hospitalized since your last visit? Yes When: 5/28/17 Where: [facility] [place]  Reason for visit: cut finger    2. Have you seen or consulted any other health care providers outside of the [address] [place] [address] since your last visit? Include any pap smears or colon screening.  No

## 2017-07-06 NOTE — PROGRESS NOTES
Mr. Lilly Reza. is a new patient who is here to establish care. CC:  Establish Care (new patient); Shoulder Pain (right should pain 10 pain. Pain has been there since Feb ); and Back Pain (had for many years)       HPI:      Back pain: had back surgery in 2002. Several visits to the ER for back pain. Back pain was improved for 10 years and then in the past 2 years pain has worsened. Has frequent visits to the ER. Patient had MRI done Dec 2016 showing postsurgical changes. Patient goes to [facility] for back injections-which provide relief( cortisone shots) and told possibly nerve compression related to hardware? Last shot in may 2017. Taking 600mg BID of gabapentin. Does not feel sleepy from medication. Right shoulder - told has bursitis 20 years ago. Pain is keeping patient up at night. Shooting pain down arm, with numbness and tingling - Pain in shoulder is 8/10/ neck hurts also. Arm feels weak at times. 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   Narcotics make patient sleepy  Patient was given Norco in the emergency room    History of alcoholism: 22 months ago quit alcohol    Works installing GPS       Review of systems:  Constitutional: negative for fever, chills, weight loss, night sweats   Eyes : negative for vision changes, eye pain and discharge  Nose and Throat: negative for tinnitus, sore throat   Cardiovascular: negative for chest pain, palpitations and shortness of breath  Respiratory: negative for shortness of breath, cough and wheezing   Gastroinstestinal: negative for abdominal pain, nausea, vomiting, diarrhea, constipation, and blood in the stool  Musculoskeletal: See HPI  Genitourinary: negative for dysuria, nocturia, polyuria and hematuria   Neurologic: Negative for focal weakness, numbness or incoordination  Skin: negative for rash, pruritus  Hematologic: negative for easy bruising      Past Medical History:   Diagnosis Date    Alcoholism ([address]     in remission    Back pain     Chronic pain     back    Concussion     H/O spinal fusion     15 years ago    Hypertension         Past Surgical History:   Procedure Laterality Date    HX ORTHOPAEDIC      Spinal fusion L5 S1 in 2003       No Known Allergies    Current Outpatient Prescriptions on File Prior to Visit   Medication Sig Dispense Refill    methocarbamol (ROBAXIN-750) 750 mg tablet Take 1 Tab by mouth four (4) times daily as needed. Indications: MUSCLE SPASM 16 Tab 0    cholecalciferol, VITAMIN D3, (VITAMIN D3) 5,000 unit tab tablet Take 1 Tab by mouth daily. 90 Tab 3     No current facility-administered medications on file prior to visit. family history includes Alcohol abuse in his father; Mariaelena Chough in his father and mother; Asthma in his mother; Cancer in his father; Diabetes in his father and mother; Elevated Lipids in his father; Heart Attack in his father and mother; Heart Disease in his father and mother; Hypertension in his father and mother; Psychiatric Disorder in his father; Thyroid Disease in his mother. Social History     Social History    Marital status:      Spouse name: N/A    Number of children: N/A    Years of education: N/A     Occupational History    Not on file.      Social History Main Topics    Smoking status: Former Smoker     Packs/day: 1.00     Years: 10.00     Types: Cigarettes     Quit date: 4/8/2014    Smokeless tobacco: Never Used    Alcohol use Yes    Drug use: No    Sexual activity: Yes     Partners: Female     Birth control/ protection: None     Other Topics Concern    Not on file     Social History Narrative       Visit Vitals    /85 (BP 1 Location: Right arm, BP Patient Position: Sitting)    Pulse 64    Temp 98 °F (36.7 °C) (Oral)    Resp 18    Ht 6' 1.2\" (1.859 m)    Wt 242 lb (109.8 kg)    SpO2 99%    BMI 31.75 kg/m2     General:  Well appearing male no acute distress  HEENT:   PERRL,normal conjunctiva. External ear and canals normal, TMs normal.  Hearing normal to voice. Nose without edema or discharge, normal septum. Lips, teeth, gums normal.  Oropharynx: no erythema, no exudates, no lesions, normal tongue. Neck:  Supple. Thyroid normal size, nontender, without nodules. No carotid bruit. No masses or lymphadenopathy  Respiratory: no respiratory distress,  no wheezing, no rhonchi, no rales. No chest wall tenderness. Cardiovascular:  RRR, normal S1S2, no murmur. Gastrointestinal: normal bowel sounds, soft, nontender, without masses. No hepatosplenomegaly. Extremities +2 pulses, no edema, normal sensation   Musculoskeletal:  Normal gait. Normal digits and nails. Normal strength and tone, no atrophy, and no abnormal movement. Right shoulder with limited range of motion 80°. Tenderness posterior part of shoulder  No swelling or redness  Skin: Multiple tattoos  Neuro:  A and OX4, fluent speech, cranial nerves normal 2-12. Sensation normal to light touch.   DTR symmetrical  Psych:  Normal affect      Lab Results   Component Value Date/Time    WBC 7.1 02/22/2017 07:47 PM    HGB 13.9 02/22/2017 07:47 PM    HCT 41.8 02/22/2017 07:47 PM    PLATELET 434 81/97/1078 07:47 PM    MCV 90.5 02/22/2017 07:47 PM     Lab Results   Component Value Date/Time    Sodium 143 02/22/2017 07:47 PM    Potassium 3.6 02/22/2017 07:47 PM    Chloride 105 02/22/2017 07:47 PM    CO2 29 02/22/2017 07:47 PM    Anion gap 9 02/22/2017 07:47 PM    Glucose 204 02/22/2017 07:47 PM    BUN 9 02/22/2017 07:47 PM    Creatinine 1.06 02/22/2017 07:47 PM    BUN/Creatinine ratio 8 02/22/2017 07:47 PM    GFR est AA >60 02/22/2017 07:47 PM    GFR est non-AA >60 02/22/2017 07:47 PM    Calcium 8.7 02/22/2017 07:47 PM     Lab Results   Component Value Date/Time    Cholesterol, total 230 10/27/2016 11:56 AM    HDL Cholesterol 55 10/27/2016 11:56 AM    LDL, calculated 147 10/27/2016 11:56 AM    VLDL, calculated 28 10/27/2016 11:56 AM    Triglyceride 140 10/27/2016 11:56 AM     Lab Results   Component Value Date/Time    TSH 1.360 10/27/2016 11:56 AM     Lab Results   Component Value Date/Time    Hemoglobin A1c 5.4 10/27/2016 11:56 AM     Lab Results   Component Value Date/Time    VITAMIN D, 25-HYDROXY 29.5 10/27/2016 11:56 AM                   Assessment and Plan:   75-year-old male with a history of chronic back pain, alcoholism in remission presenting to [facility] care  1. Chronic midline low back pain with bilateral sciatica  -Had back surgery in 2003  [facility] where he gets periodic-injections in his back with cortisone  -We will increase gabapentin to 900 twice daily  - gabapentin (NEURONTIN) 300 mg capsule; Take 3 Caps by mouth two (2) times a day. Dispense: 180 Cap; Refill: 3      2. Acute on chronic  pain of right shoulder  -Reviewed x-ray of shoulder done in the past.  - Jacques Reese  - MRI SHOULDER RT WO CONT; Future  - diclofenac (VOLTAREN) 1 % gel; Apply 4 g to affected area four (4) times daily. Dispense: 1 Each; Refill: 5  - METABOLIC PANEL, BASIC  -- traMADol (ULTRAM) 50 mg tablet; Take 1 Tab by mouth every eight (8) hours as needed for Pain. Max Daily Amount: 150 mg. Dispense: 90 Tab; Refill: 1  - COMPLIANCE DRUG SCREEN/PRESCRIPTION MONITORING  Pain contract    3. Low vitamin D level  - VITAMIN D, 25 HYDROXY      Follow-up Disposition:  Return in about 3 months (around 10/6/2017) for chronic back pain and shoulder pain .      MD Gretchen

## 2017-07-06 NOTE — MR AVS SNAPSHOT
Visit Information Date & Time Provider Department Dept. Phone Encounter #  
 7/6/2017  1:45 PM Joseph Godinez [address] [place] [address],[address] [phone] [id number] Follow-up Instructions Return in about 3 months (around 10/6/2017) for chronic back pain and shoulder pain . Upcoming Health Maintenance Date Due INFLUENZA AGE 9 TO ADULT 8/1/2017 DTaP/Tdap/Td series (2 - Td) 10/4/2023 Allergies as of 7/6/2017  Review Complete On: 7/6/2017 By: Kitty Gaston No Known Allergies Current Immunizations  Reviewed on 10/4/2013 Name Date Influenza Vaccine 10/4/2013 Influenza Vaccine (Quad) PF 10/27/2016, 11/25/2014 11:48 AM  
 Tdap 10/4/2013 Not reviewed this visit You Were Diagnosed With   
  
 Codes Comments Chronic midline low back pain with bilateral sciatica    -  Primary ICD-10-CM: M54.41, M54.42, G89.29 ICD-9-CM: 724.2, 724.3, 338.29 Acute pain of right shoulder     ICD-10-CM: M25.511 ICD-9-CM: 719.41 Chronic right shoulder pain     ICD-10-CM: M25.511, G89.29 ICD-9-CM: 719.41, 338.29 Low vitamin D level     ICD-10-CM: E55.9 ICD-9-CM: 268.9 General medical exam     ICD-10-CM: Z00.00 ICD-9-CM: V70.9 Vitals BP Pulse Temp Resp Height(growth percentile) Weight(growth percentile) 131/85 (BP 1 Location: Right arm, BP Patient Position: Sitting) 64 98 °F (36.7 °C) (Oral) 18 6' 1.2\" (1.859 m) 242 lb (109.8 kg) SpO2 BMI Smoking Status 99% 31.75 kg/m2 Former Smoker BMI and BSA Data Body Mass Index Body Surface Area 31.75 kg/m 2 2.38 m 2 Preferred Pharmacy Pharmacy Name Phone [id number] [address] AT [address] [place] [address] [phone] Your Updated Medication List  
  
   
This list is accurate as of: 7/6/17  2:29 PM.  Always use your most recent med list.  
  
  
  
  
 cholecalciferol (VITAMIN D3) 5,000 unit Tab tablet Commonly known as:  VITAMIN D3 Take 1 Tab by mouth daily. diclofenac 1 % Gel Commonly known as:  VOLTAREN Apply 4 g to affected area four (4) times daily. gabapentin 300 mg capsule Commonly known as:  NEURONTIN Take 3 Caps by mouth two (2) times a day. methocarbamol 750 mg tablet Commonly known as:  [id number] Take 1 Tab by mouth four (4) times daily as needed. Indications: MUSCLE SPASM  
  
 traMADol 50 mg tablet Commonly known as:  ULTRAM  
Take 1 Tab by mouth every eight (8) hours as needed for Pain. Max Daily Amount: 150 mg.  
  
 traZODone 50 mg tablet Commonly known as:  DESYREL  
TAKE 1/2 TO 1 TABLET BY MOUTH AT BEDTIME Prescriptions Printed Refills  
 traMADol (ULTRAM) 50 mg tablet 1 Sig: Take 1 Tab by mouth every eight (8) hours as needed for Pain. Max Daily Amount: 150 mg.  
 Class: Print Route: Oral  
  
Prescriptions Sent to Pharmacy Refills  
 gabapentin (NEURONTIN) 300 mg capsule 3 Sig: Take 3 Caps by mouth two (2) times a day. Class: Normal  
 Pharmacy: [facility]/pharmacy [facility] [place] [address] AT [address] [place] [address] Ph #: [phone] Route: Oral  
 diclofenac (VOLTAREN) 1 % gel 5 Sig: Apply 4 g to affected area four (4) times daily. Class: Normal  
 Pharmacy: [facility]/pharmacy #[address] [place] [address] AT [address] [place] [address] Ph #: [phone] Route: Topical  
  
We Performed the Following [address] [place] [address] MONITORING [[id number] Custom] METABOLIC PANEL, BASIC [08585 CPT(R)] REFERRAL TO ORTHOPEDICS [[id number] Custom] VITAMIN D, 25 HYDROXY [id number] CPT(R)] Follow-up Instructions Return in about 3 months (around 10/6/2017) for chronic back pain and shoulder pain . To-Do List   
 07/06/2017 Imaging:  MRI SHOULDER RT WO CONT Referral Information Referral ID Referred By Referred To  
  
 [id number] GARRETT [place] [facility] [address] Phone: [phone] Visits Status Start Date End Date 1 New Request 7/6/17 7/6/18 If your referral has a status of pending review or denied, additional information will be sent to support the outcome of this decision. Referral ID Referred By Referred To  
 [id number] APPA Chelsey Bah Not Available Visits Status Start Date End Date 1 New Request 7/6/17 7/6/18 If your referral has a status of pending review or denied, additional information will be sent to support the outcome of this decision. Patient Instructions Shoulder Arthritis: Exercises Your Care Instructions Here are some examples of typical rehabilitation exercises for your condition. Start each exercise slowly. Ease off the exercise if you start to have pain. Your doctor or physical therapist will tell you when you can start these exercises and which ones will work best for you. How to do the exercises Shoulder flexion (lying down) Note: To make a wand for this exercise, use a piece of PVC pipe or a broom handle with the broom removed. Make the wand about a foot wider than your shoulders. 1. Lie on your back, holding a wand with both hands. Your palms should face down as you hold the wand. 2. Keeping your elbows straight, slowly raise your arms over your head. Raise them until you feel a stretch in your shoulders, upper back, and chest. 
3. Hold for 15 to 30 seconds. 4. Repeat 2 to 4 times. Shoulder rotation (lying down) Note: To make a wand for this exercise, use a piece of PVC pipe or a broom handle with the broom removed. Make the wand about a foot wider than your shoulders. 1. Lie on your back. Hold a wand with both hands with your elbows bent and palms up. 2. Keep your elbows close to your body, and move the wand across your body toward the sore arm. 3. Hold for 8 to 12 seconds. 4. Repeat 2 to 4 times. Shoulder internal rotation with towel 1. Hold a towel above and behind your head with the arm that is not sore. 2. With your sore arm, reach behind your back and grasp the towel. 3. With the arm above your head, pull the towel upward. Do this until you feel a stretch on the front and outside of your sore shoulder. 4. Hold 15 to 30 seconds. 5. Repeat 2 to 4 times. Shoulder blade squeeze 1. Stand with your arms at your sides, and squeeze your shoulder blades together. Do not raise your shoulders up as you squeeze. 2. Hold 6 seconds. 3. Repeat 8 to 12 times. Resisted rows Note: For this exercise, you will need elastic exercise material, such as surgical tubing or Thera-Band. 1. Put the band around a solid object at about waist level. (A bedpost will work well.) Each hand should hold an end of the band. 2. With your elbows at your sides and bent to 90 degrees, pull the band back. Your shoulder blades should move toward each other. Return to the starting position. 3. Repeat 8 to 12 times. External rotator strengthening exercise 1. Start by tying a piece of elastic exercise material to a doorknob. You can use surgical tubing or Thera-Band. (You may also hold one end of the band in each hand.) 2. Stand or sit with your shoulder relaxed and your elbow bent 90 degrees. Your upper arm should rest comfortably against your side. Squeeze a rolled towel between your elbow and your body for comfort. This will help keep your arm at your side. 3. Hold one end of the elastic band with the hand of the painful arm. 4. Start with your forearm across your belly. Slowly rotate the forearm out away from your body. Keep your elbow and upper arm tucked against the towel roll or the side of your body until you begin to feel tightness in your shoulder. Slowly move your arm back to where you started. 5. Repeat 8 to 12 times. Internal rotator strengthening exercise 1. Start by tying a piece of elastic exercise material to a doorknob. You can use surgical tubing or Thera-Band. 2. Stand or sit with your shoulder relaxed and your elbow bent 90 degrees. Your upper arm should rest comfortably against your side. Squeeze a rolled towel between your elbow and your body for comfort. This will help keep your arm at your side. 3. Hold one end of the elastic band in the hand of the painful arm. 4. Slowly rotate your forearm toward your body until it touches your belly. Slowly move it back to where you started. 5. Keep your elbow and upper arm firmly tucked against the towel roll or at your side. 6. Repeat 8 to 12 times. Pendulum swing Note: If you have pain in your back, do not do this exercise. 1. Hold on to a table or the back of a chair with your good arm. Then bend forward a little and let your sore arm hang straight down. This exercise does not use the arm muscles. Rather, use your legs and your hips to create movement that makes your arm swing freely. 2. Use the movement from your hips and legs to guide the slightly swinging arm back and forth like a pendulum (or elephant trunk). Then guide it in circles that start small (about the size of a dinner plate). Make the circles a bit larger each day, as your pain allows. 3. Do this exercise for 5 minutes, 5 to 7 times each day. 4. As you have less pain, try bending over a little farther to do this exercise. This will increase the amount of movement at your shoulder. Follow-up care is a key part of your treatment and safety. Be sure to make and go to all appointments, and call your doctor if you are having problems. It's also a good idea to know your test results and keep a list of the medicines you take. Where can you learn more? Go to http://dell-sherie.info/. Enter H562 in the search box to learn more about \"Shoulder Arthritis: Exercises. \" Current as of: March 21, 2017 Content Version: 11.3 © 2759-1330 [employer]wise, Incorporated. Care instructions adapted under license by [employer] (which disclaims liability or warranty for this information). If you have questions about a medical condition or this instruction, always ask your healthcare professional. [address] any warranty or liability for your use of this information. Introducing [facility]! Dear Keron Recinos: Thank you for requesting a SurgiLight account. Our records indicate that you already have an active SurgiLight account. You can access your account anytime at [email or web address] Did you know that you can access your hospital and ER discharge instructions at any time in SurgiLight? You can also review all of your test results from your hospital stay or ER visit. Additional Information If you have questions, please visit the Frequently Asked Questions section of the SurgiLight website at [email or web address] Remember, SurgiLight is NOT to be used for urgent needs. For medical emergencies, dial 911. Now available from your iPhone and Android! Please provide this summary of care documentation to your next provider. Your primary care clinician is listed as ERNIE Wing. If you have any questions after today's visit, please call [phone].

## 2017-07-06 NOTE — PATIENT INSTRUCTIONS
Shoulder Arthritis: Exercises  Your Care Instructions  Here are some examples of typical rehabilitation exercises for your condition. Start each exercise slowly. Ease off the exercise if you start to have pain. Your doctor or physical therapist will tell you when you can start these exercises and which ones will work best for you. How to do the exercises  Shoulder flexion (lying down)    Note: To make a wand for this exercise, use a piece of PVC pipe or a broom handle with the broom removed. Make the wand about a foot wider than your shoulders. 1. Lie on your back, holding a wand with both hands. Your palms should face down as you hold the wand. 2. Keeping your elbows straight, slowly raise your arms over your head. Raise them until you feel a stretch in your shoulders, upper back, and chest.  3. Hold for 15 to 30 seconds. 4. Repeat 2 to 4 times. Shoulder rotation (lying down)    Note: To make a wand for this exercise, use a piece of PVC pipe or a broom handle with the broom removed. Make the wand about a foot wider than your shoulders. 1. Lie on your back. Hold a wand with both hands with your elbows bent and palms up. 2. Keep your elbows close to your body, and move the wand across your body toward the sore arm. 3. Hold for 8 to 12 seconds. 4. Repeat 2 to 4 times. Shoulder internal rotation with towel    1. Hold a towel above and behind your head with the arm that is not sore. 2. With your sore arm, reach behind your back and grasp the towel. 3. With the arm above your head, pull the towel upward. Do this until you feel a stretch on the front and outside of your sore shoulder. 4. Hold 15 to 30 seconds. 5. Repeat 2 to 4 times. Shoulder blade squeeze    1. Stand with your arms at your sides, and squeeze your shoulder blades together. Do not raise your shoulders up as you squeeze. 2. Hold 6 seconds. 3. Repeat 8 to 12 times.   Resisted rows    Note: For this exercise, you will need elastic exercise material, such as surgical tubing or Thera-Band. 1. Put the band around a solid object at about waist level. (A bedpost will work well.) Each hand should hold an end of the band. 2. With your elbows at your sides and bent to 90 degrees, pull the band back. Your shoulder blades should move toward each other. Return to the starting position. 3. Repeat 8 to 12 times. External rotator strengthening exercise    1. Start by tying a piece of elastic exercise material to a doorknob. You can use surgical tubing or Thera-Band. (You may also hold one end of the band in each hand.)  2. Stand or sit with your shoulder relaxed and your elbow bent 90 degrees. Your upper arm should rest comfortably against your side. Squeeze a rolled towel between your elbow and your body for comfort. This will help keep your arm at your side. 3. Hold one end of the elastic band with the hand of the painful arm. 4. Start with your forearm across your belly. Slowly rotate the forearm out away from your body. Keep your elbow and upper arm tucked against the towel roll or the side of your body until you begin to feel tightness in your shoulder. Slowly move your arm back to where you started. 5. Repeat 8 to 12 times. Internal rotator strengthening exercise    1. Start by tying a piece of elastic exercise material to a doorknob. You can use surgical tubing or Thera-Band. 2. Stand or sit with your shoulder relaxed and your elbow bent 90 degrees. Your upper arm should rest comfortably against your side. Squeeze a rolled towel between your elbow and your body for comfort. This will help keep your arm at your side. 3. Hold one end of the elastic band in the hand of the painful arm. 4. Slowly rotate your forearm toward your body until it touches your belly. Slowly move it back to where you started. 5. Keep your elbow and upper arm firmly tucked against the towel roll or at your side. 6. Repeat 8 to 12 times.   Pendulum swing    Note: If you have pain in your back, do not do this exercise. 1. Hold on to a table or the back of a chair with your good arm. Then bend forward a little and let your sore arm hang straight down. This exercise does not use the arm muscles. Rather, use your legs and your hips to create movement that makes your arm swing freely. 2. Use the movement from your hips and legs to guide the slightly swinging arm back and forth like a pendulum (or elephant trunk). Then guide it in circles that start small (about the size of a dinner plate). Make the circles a bit larger each day, as your pain allows. 3. Do this exercise for 5 minutes, 5 to 7 times each day. 4. As you have less pain, try bending over a little farther to do this exercise. This will increase the amount of movement at your shoulder. Follow-up care is a key part of your treatment and safety. Be sure to make and go to all appointments, and call your doctor if you are having problems. It's also a good idea to know your test results and keep a list of the medicines you take. Where can you learn more? Go to http://dell-sherie.info/. Enter H562 in the search box to learn more about \"Shoulder Arthritis: Exercises. \"  Current as of: March 21, 2017  Content Version: 11.3  © 1169-0470 [employer]. Care instructions adapted under license by [employer] (which disclaims liability or warranty for this information). If you have questions about a medical condition or this instruction, always ask your healthcare professional. [address] any warranty or liability for your use of this information.

## 2017-07-06 NOTE — LETTER
AGREEMENT for controlled medication treatment Akua Vicente., have agreed to a course of treatment that includes taking controlled medication. For this treatment I designate _____________________________________ as the [facility] Provider.  The purpose of this agreement is to prevent misunderstandings about controlled medications I may be taking for treatment, and to comply with applicable laws. I understand this agreement is essential to the trust and confidence necessary in a provider-patient relationship and that the designated provider will treat me in accordance with the statements below. As part of my treatment I agree to the followin.  USE 
a. I will take the controlled medication as instructed by the designated provider and avoid improper use of controlled medications. b.   I will not share, sell or trade controlled medication with anyone as this is a criminal offense.  
c.   I will not use any illegal controlled substance, including marijuana, cocaine, etc. as this is a criminal offense. 2.  PROVIDERS 
a. I will only obtain controlled medication from the designated provider. b.   I will not attempt to obtain the same or similar controlled medications, such as opioid pain medication, stimulants, anti-anxiety or hypnotics from any other provider as this is a criminal offense. 
c.   I will inform the designated provider about all other licensed professionals providing medical care to me and authorize communication between all providers to coordinate care, particularly prescribing or dispensing of controlled medications. 3.  PHARMACY 
a.   I will only fill controlled medication prescriptions at the approved pharmacy as listed below. 4.  OFFICE VISITS 
a. I agree to attend scheduled office visit appointments. b.   I am aware my office visits may be monthly or as determined necessary by the designated provider. c.   I will communicate fully with the designated provider about the character and intensity of my symptoms, the effect of the symptoms on my daily life, and how well the controlled medication is helping to relieve the cause of my symptoms. 5.  REFILLS OR CALL-IN PRESCRIPTIONS OF CONTROLLED MEDICATIONS 
a. I agree that refills of my prescriptions for controlled medications will be made at the time of an office visit during regular office hours. No refills will be available during evenings or on weekends. Abusive or inappropriate behavior related to medication refills will not be tolerated. b.   I will not call the office repeatedly to inquire about my controlled medication. I understand that medications will be written on the due date and not before. Calling the office repeatedly will be considered harassment, and I may be discharged from the practice. c.   Controlled medications may not be called in for refill, but doing so is at the discretion of the designated provider. d.   I am aware that only I must  prescriptions for controlled medications at the office but the designated provider may allow a designee to  the prescription from the office under very specific circumstance that may develop. 6.  TOXICOLOGY SCREENING 
a. I agree to random urine toxicology screenings in order to comply with government and [address] [place] [address] regulations. I understand that I will be financially responsible for any charges incurred by this office, Shira Gruber of [facility] laboratory, [employer], or [facility] for the urine toxicology screening, which may not be covered by my insurance. Failure to do so will be considered non-compliance and I may be discharged. b. In some cases an oral swab or hair sample may be substituted for a urine screen. This is at the discretion of the designated provider. I understand that I will be financially responsible for any charges incurred as well. c.   I understand that if the urine toxicology does not show my medications prescribed to me by the designated provider, or it shows any illegal substance or any other medications NOT prescribed by the designated providers, I may be discharged from this practice at the discretion of the designated provider and no further controlled medications will be prescribed or follow-up appointments scheduled. Also, if any illegal substances are detected on the urine toxicology, this information may be provided to local law enforcement. 7. PILL COUNTS 
a. I am aware I may be called at any time to come into the office for a count of all my remaining controlled medications in order to help the designated provider understand the rate at which I use my controlled medications and to more effectively adjust dosage. b. I agree that I will use my medication at a rate NO greater than the prescribed rate and that use of my medication at a greater rate will result in my being without medication for the period of time until next expected due date. c. I will bring in the containers with the medication prescribed by all providers, including the designated provider, to each office visit even if there is no medication remaining. All controlled medication will be in the original containers from the pharmacy for each medication. Failure to do so will be considered non-compliance and I may be discharged from the practice. 8.  LOSS OR THEFT OF MEDICATION 
a. I will safeguard my controlled medication from loss or theft. b.   Lost or stolen controlled medication may not be replaced. This includes a prescription that has not yet been filled at the pharmacy. c.   In the event my controlled medications are stolen or lost, I will notify the designated providers office immediately.   If such event occurs during the night, weekend or holiday, I will leave a detailed message on the answering machine or answering service at the number listed above. 
d.   I will file and produce an official police report for any effort to replace controlled medications prescribed. 9.  AGENCY COLLABORATION I authorize the designated provider and the authorized pharmacy/pharmacist to cooperate fully with any city, state, or federal law enforcement agency in the investigation of any possible misuse, sale or other diversion of my controlled medication. 10.  TREATMENT I understand that if I violate any of the conditions, my controlled medication and/or treatment will be terminated. If the violation involves obtaining controlled substances and/or dangerous drugs from another source, the incident may be reported to other medical facilities and authorities, including law enforcement. In this case, the designated provider may taper off the medication over a period of several days, as necessary, to avoid withdrawal symptoms or will suggest alternate treatment facilities. Also, a drug dependence treatment program may be recommended. 11.  AGREEMENT I agree to follow these guidelines that have been fully explained to me. All of my questions and concerns regarding treatment have been adequately answered. A copy of this document has been given to me. I agree to use ______________________________ Authorized Pharmacy located at _________________________________________________________________ Telephone ________________________________for filling ALL controlled medication prescriptions. This agreement is entered into on 7/6/2017 Patient signature__________________________________________________________ Legal Guardian signature___________________________________________________ Provider signature_________________________________________________________ Witness signature_________________________________________________________

## 2017-07-13 LAB
25(OH)D3+25(OH)D2 SERPL-MCNC: 44 NG/ML (ref 30–100)
BUN SERPL-MCNC: 19 MG/DL (ref 6–20)
BUN/CREAT SERPL: 22 (ref 9–20)
CALCIUM SERPL-MCNC: 9.8 MG/DL (ref 8.7–10.2)
CHLORIDE SERPL-SCNC: 98 MMOL/L (ref 96–106)
CO2 SERPL-SCNC: 21 MMOL/L (ref 18–29)
CREAT SERPL-MCNC: 0.86 MG/DL (ref 0.76–1.27)
DRUGS UR: NORMAL
GLUCOSE SERPL-MCNC: 78 MG/DL (ref 65–99)
POTASSIUM SERPL-SCNC: 4.5 MMOL/L (ref 3.5–5.2)
SODIUM SERPL-SCNC: 140 MMOL/L (ref 134–144)

## 2017-07-13 NOTE — PROGRESS NOTES
Please clarify if patient is taking benadryl and trazodone - those were not listed as medications during appointment.    Gabapentin which is prescribed is not on the urine   Please advise patient that if next urine drug screen is not appropriate ( reflecting medications prescribed) , I will not prescribe tramadol   Kidney function is normal  Vitamin D is normal

## 2017-07-19 NOTE — PROGRESS NOTES
Spoke with patient ,clarify if patient is taking benadryl and trazodone - those were not listed as medications during appointment. Gabapentin which is prescribed is not on the urine   Please advise patient that if next urine drug screen is not appropriate ( reflecting medications prescribed) , I will not prescribe tramadol   Kidney function is normal   Vitamin D is normal  Asked patient if any questions or concerns at the time. Spoke to the patient about the benadryl and trazodone, Patient stated that he takes the benadryl when he stays at his girlfriends house she has a cat he is allergic too. The trazodone was given to him from pain management and stated he spoke to you about it ,  it is on the med list. Patient stated that Dr Nery Michel told him to not take both so he will not take the trazodone. Patient states that he forgets to take the gabapentin. . Patient understand that if his next drug screen does not reflect the medication prescribed that he will not get the prescribed tramadol.

## 2017-07-29 ENCOUNTER — HOSPITAL ENCOUNTER (EMERGENCY)
Age: 37
Discharge: HOME | End: 2017-07-29
Attending: EMERGENCY MEDICINE
Payer: COMMERCIAL

## 2017-07-29 VITALS
HEART RATE: 77 BPM | WEIGHT: 235 LBS | TEMPERATURE: 97.8 F | OXYGEN SATURATION: 97 % | SYSTOLIC BLOOD PRESSURE: 136 MMHG | BODY MASS INDEX: 31.14 KG/M2 | DIASTOLIC BLOOD PRESSURE: 76 MMHG | HEIGHT: 73 IN | RESPIRATION RATE: 16 BRPM

## 2017-07-29 DIAGNOSIS — Z98.1 HISTORY OF SPINAL FUSION: ICD-10-CM

## 2017-07-29 DIAGNOSIS — F41.9 ANXIETY: ICD-10-CM

## 2017-07-29 DIAGNOSIS — M54.50 ACUTE EXACERBATION OF CHRONIC LOW BACK PAIN: Primary | ICD-10-CM

## 2017-07-29 DIAGNOSIS — F17.200 NICOTINE DEPENDENCE, UNCOMPLICATED, UNSPECIFIED NICOTINE PRODUCT TYPE: ICD-10-CM

## 2017-07-29 DIAGNOSIS — G89.29 ACUTE EXACERBATION OF CHRONIC LOW BACK PAIN: Primary | ICD-10-CM

## 2017-07-29 PROCEDURE — 74011636637 HC RX REV CODE- 636/637: Performed by: PHYSICIAN ASSISTANT

## 2017-07-29 PROCEDURE — 74011250636 HC RX REV CODE- 250/636: Performed by: PHYSICIAN ASSISTANT

## 2017-07-29 PROCEDURE — 96372 THER/PROPH/DIAG INJ SC/IM: CPT

## 2017-07-29 PROCEDURE — 74011250637 HC RX REV CODE- 250/637: Performed by: PHYSICIAN ASSISTANT

## 2017-07-29 PROCEDURE — 99283 EMERGENCY DEPT VISIT LOW MDM: CPT

## 2017-07-29 RX ORDER — HYDROMORPHONE HYDROCHLORIDE 1 MG/ML
1 INJECTION, SOLUTION INTRAMUSCULAR; INTRAVENOUS; SUBCUTANEOUS
Status: COMPLETED | OUTPATIENT
Start: 2017-07-29 | End: 2017-07-29

## 2017-07-29 RX ORDER — DIAZEPAM 5 MG/1
5 TABLET ORAL
Status: COMPLETED | OUTPATIENT
Start: 2017-07-29 | End: 2017-07-29

## 2017-07-29 RX ORDER — PREDNISONE 5 MG/1
TABLET ORAL
Qty: 21 TAB | Refills: 0 | Status: SHIPPED | OUTPATIENT
Start: 2017-07-29 | End: 2017-09-01 | Stop reason: ALTCHOICE

## 2017-07-29 RX ORDER — HYDROCODONE BITARTRATE AND ACETAMINOPHEN 5; 325 MG/1; MG/1
1 TABLET ORAL
Qty: 15 TAB | Refills: 0 | Status: SHIPPED | OUTPATIENT
Start: 2017-07-29 | End: 2017-09-01 | Stop reason: ALTCHOICE

## 2017-07-29 RX ORDER — PROMETHAZINE HYDROCHLORIDE 25 MG/1
25 TABLET ORAL
Status: COMPLETED | OUTPATIENT
Start: 2017-07-29 | End: 2017-07-29

## 2017-07-29 RX ORDER — PREDNISONE 20 MG/1
60 TABLET ORAL
Status: COMPLETED | OUTPATIENT
Start: 2017-07-29 | End: 2017-07-29

## 2017-07-29 RX ORDER — METHOCARBAMOL 750 MG/1
750 TABLET, FILM COATED ORAL 3 TIMES DAILY
Qty: 15 TAB | Refills: 0 | Status: SHIPPED | OUTPATIENT
Start: 2017-07-29 | End: 2017-08-03

## 2017-07-29 RX ORDER — OXYCODONE AND ACETAMINOPHEN 5; 325 MG/1; MG/1
1 TABLET ORAL
Qty: 12 TAB | Refills: 0 | Status: SHIPPED | OUTPATIENT
Start: 2017-07-29 | End: 2017-07-29 | Stop reason: CLARIF

## 2017-07-29 RX ADMIN — HYDROMORPHONE HYDROCHLORIDE 1 MG: 1 INJECTION, SOLUTION INTRAMUSCULAR; INTRAVENOUS; SUBCUTANEOUS at 19:44

## 2017-07-29 RX ADMIN — PROMETHAZINE HYDROCHLORIDE 25 MG: 25 TABLET ORAL at 19:44

## 2017-07-29 RX ADMIN — PREDNISONE 60 MG: 20 TABLET ORAL at 19:43

## 2017-07-29 RX ADMIN — DIAZEPAM 5 MG: 5 TABLET ORAL at 19:43

## 2017-07-29 NOTE — ED NOTES
Patient c/o low back pain radiating down right leg, started last night, denies injury, h/o low back pain.

## 2017-07-29 NOTE — DISCHARGE INSTRUCTIONS
Back Pain, Emergency or Urgent Symptoms: Care Instructions  Your Care Instructions  Many people have back pain at one time or another. In most cases, pain gets better with self-care that includes over-the-counter pain medicine, ice, heat, and exercises. Unless you have symptoms of a severe injury or heart attack, you may be able to give yourself a few days before you call a doctor. But some back problems are very serious. Do not ignore symptoms that need to be checked right away. Follow-up care is a key part of your treatment and safety. Be sure to make and go to all appointments, and call your doctor if you are having problems. It's also a good idea to know your test results and keep a list of the medicines you take. How can you care for yourself at home? · Sit or lie in positions that are most comfortable and that reduce your pain. Try one of these positions when you lie down:  ¨ Lie on your back with your knees bent and supported by large pillows. ¨ Lie on the floor with your legs on the seat of a sofa or chair. Clement Abhi on your side with your knees and hips bent and a pillow between your legs. ¨ Lie on your stomach if it does not make pain worse. · Do not sit up in bed, and avoid soft couches and twisted positions. Bed rest can help relieve pain at first, but it delays healing. Avoid bed rest after the first day. · Change positions every 30 minutes. If you must sit for long periods of time, take breaks from sitting. Get up and walk around, or lie flat. · Try using a heating pad on a low or medium setting, for 15 to 20 minutes every 2 or 3 hours. Try a warm shower in place of one session with the heating pad. You can also buy single-use heat wraps that last up to 8 hours. You can also try ice or cold packs on your back for 10 to 20 minutes at a time, several times a day. (Put a thin cloth between the ice pack and your skin.) This reduces pain and makes it easier to be active and exercise.   · Take pain medicines exactly as directed. ¨ If the doctor gave you a prescription medicine for pain, take it as prescribed. ¨ If you are not taking a prescription pain medicine, ask your doctor if you can take an over-the-counter medicine. When should you call for help? Call 911 anytime you think you may need emergency care. For example, call if:  · You are unable to move a leg at all. · You have back pain with severe belly pain. · You have symptoms of a heart attack. These may include:  ¨ Chest pain or pressure, or a strange feeling in the chest.  ¨ Sweating. ¨ Shortness of breath. ¨ Nausea or vomiting. ¨ Pain, pressure, or a strange feeling in the back, neck, jaw, or upper belly or in one or both shoulders or arms. ¨ Lightheadedness or sudden weakness. ¨ A fast or irregular heartbeat. After you call 911, the  may tell you to chew 1 adult-strength or 2 to 4 low-dose aspirin. Wait for an ambulance. Do not try to drive yourself. Call your doctor now or seek immediate medical care if:  · You have new or worse symptoms in your arms, legs, chest, belly, or buttocks. Symptoms may include:  ¨ Numbness or tingling. ¨ Weakness. ¨ Pain. · You lose bladder or bowel control. · You have back pain and:  ¨ You have injured your back while lifting or doing some other activity. Call if the pain is severe, has not gone away after 1 or 2 days, and you cannot do your normal daily activities. ¨ You have had a back injury before that needed treatment. ¨ Your pain has lasted longer than 4 weeks. ¨ You have had weight loss you cannot explain. ¨ You are age 48 or older. ¨ You have cancer now or have had it before. Watch closely for changes in your health, and be sure to contact your doctor if you are not getting better as expected. Where can you learn more? Go to http://dell-sherie.info/.   Enter E715 in the search box to learn more about \"Back Pain, Emergency or Urgent Symptoms: Care Instructions. \"  Current as of: March 20, 2017  Content Version: 11.3  © 6097-1163 [employer]. Care instructions adapted under license by [employer] (which disclaims liability or warranty for this information). If you have questions about a medical condition or this instruction, always ask your healthcare professional. [address] any warranty or liability for your use of this information. Low Back Pain: Exercises  Your Care Instructions  Here are some examples of typical rehabilitation exercises for your condition. Start each exercise slowly. Ease off the exercise if you start to have pain. Your doctor or physical therapist will tell you when you can start these exercises and which ones will work best for you. How to do the exercises  Press-up    1. Lie on your stomach, supporting your body with your forearms. 2. Press your elbows down into the floor to raise your upper back. As you do this, relax your stomach muscles and allow your back to arch without using your back muscles. As your press up, do not let your hips or pelvis come off the floor. 3. Hold for 15 to 30 seconds, then relax. 4. Repeat 2 to 4 times. Alternate arm and leg (bird dog) exercise    Note: Do this exercise slowly. Try to keep your body straight at all times, and do not let one hip drop lower than the other. 1. Start on the floor, on your hands and knees. 2. Tighten your belly muscles. 3. Raise one leg off the floor, and hold it straight out behind you. Be careful not to let your hip drop down, because that will twist your trunk. 4. Hold for about 6 seconds, then lower your leg and switch to the other leg. 5. Repeat 8 to 12 times on each leg. 6. Over time, work up to holding for 10 to 30 seconds each time. 7. If you feel stable and secure with your leg raised, try raising the opposite arm straight out in front of you at the same time. Knee-to-chest exercise    1.  Lie on your back with your knees bent and your feet flat on the floor. 2. Bring one knee to your chest, keeping the other foot flat on the floor (or keeping the other leg straight, whichever feels better on your lower back). 3. Keep your lower back pressed to the floor. Hold for at least 15 to 30 seconds. 4. Relax, and lower the knee to the starting position. 5. Repeat with the other leg. Repeat 2 to 4 times with each leg. 6. To get more stretch, put your other leg flat on the floor while pulling your knee to your chest.  Curl-ups    1. Lie on the floor on your back with your knees bent at a 90-degree angle. Your feet should be flat on the floor, about 12 inches from your buttocks. 2. Cross your arms over your chest. If this bothers your neck, try putting your hands behind your neck (not your head), with your elbows spread apart. 3. Slowly tighten your belly muscles and raise your shoulder blades off the floor. 4. Keep your head in line with your body, and do not press your chin to your chest.  5. Hold this position for 1 or 2 seconds, then slowly lower yourself back down to the floor. 6. Repeat 8 to 12 times. Pelvic tilt exercise    1. Lie on your back with your knees bent. 2. \"Brace\" your stomach. This means to tighten your muscles by pulling in and imagining your belly button moving toward your spine. You should feel like your back is pressing to the floor and your hips and pelvis are rocking back. 3. Hold for about 6 seconds while you breathe smoothly. 4. Repeat 8 to 12 times. Heel dig bridging    1. Lie on your back with both knees bent and your ankles bent so that only your heels are digging into the floor. Your knees should be bent about 90 degrees. 2. Then push your heels into the floor, squeeze your buttocks, and lift your hips off the floor until your shoulders, hips, and knees are all in a straight line.   3. Hold for about 6 seconds as you continue to breathe normally, and then slowly lower your hips back down to the floor and rest for up to 10 seconds. 4. Do 8 to 12 repetitions. Hamstring stretch in doorway    1. Lie on your back in a doorway, with one leg through the open door. 2. Slide your leg up the wall to straighten your knee. You should feel a gentle stretch down the back of your leg. 3. Hold the stretch for at least 15 to 30 seconds. Do not arch your back, point your toes, or bend either knee. Keep one heel touching the floor and the other heel touching the wall. 4. Repeat with your other leg. 5. Do 2 to 4 times for each leg. Hip flexor stretch    1. Kneel on the floor with one knee bent and one leg behind you. Place your forward knee over your foot. Keep your other knee touching the floor. 2. Slowly push your hips forward until you feel a stretch in the upper thigh of your rear leg. 3. Hold the stretch for at least 15 to 30 seconds. Repeat with your other leg. 4. Do 2 to 4 times on each side. Wall sit    1. Stand with your back 10 to 12 inches away from a wall. 2. Lean into the wall until your back is flat against it. 3. Slowly slide down until your knees are slightly bent, pressing your lower back into the wall. 4. Hold for about 6 seconds, then slide back up the wall. 5. Repeat 8 to 12 times. Follow-up care is a key part of your treatment and safety. Be sure to make and go to all appointments, and call your doctor if you are having problems. It's also a good idea to know your test results and keep a list of the medicines you take. Where can you learn more? Go to http://dell-sherie.info/. Enter B448 in the search box to learn more about \"Low Back Pain: Exercises. \"  Current as of: March 21, 2017  Content Version: 11.3  © 1668-2182 [employer]. Care instructions adapted under license by [employer] (which disclaims liability or warranty for this information).  If you have questions about a medical condition or this instruction, always ask your healthcare professional. [address] any warranty or liability for your use of this information. Sciatica: Exercises  Your Care Instructions  Here are some examples of typical rehabilitation exercises for your condition. Start each exercise slowly. Ease off the exercise if you start to have pain. Your doctor or physical therapist will tell you when you can start these exercises and which ones will work best for you. When you are not being active, find a comfortable position for rest. Some people are comfortable on the floor or a medium-firm bed with a small pillow under their head and another under their knees. Some people prefer to lie on their side with a pillow between their knees. Don't stay in one position for too long. Take short walks (10 to 20 minutes) every 2 to 3 hours. Avoid slopes, hills, and stairs until you feel better. Walk only distances you can manage without pain, especially leg pain. How to do the exercises  Back stretches    5. Get down on your hands and knees on the floor. 6. Relax your head and allow it to droop. Round your back up toward the ceiling until you feel a nice stretch in your upper, middle, and lower back. Hold this stretch for as long as it feels comfortable, or about 15 to 30 seconds. 7. Return to the starting position with a flat back while you are on your hands and knees. 8. Let your back sway by pressing your stomach toward the floor. Lift your buttocks toward the ceiling. 9. Hold this position for 15 to 30 seconds. 10. Repeat 2 to 4 times. Follow-up care is a key part of your treatment and safety. Be sure to make and go to all appointments, and call your doctor if you are having problems. It's also a good idea to know your test results and keep a list of the medicines you take. Where can you learn more? Go to http://dell-sherie.info/.   Enter T578 in the search box to learn more about \"Sciatica: Exercises. \"  Current as of: March 21, 2017  Content Version: 11.3  © 4032-7570 [employer]. Care instructions adapted under license by [employer] (which disclaims liability or warranty for this information). If you have questions about a medical condition or this instruction, always ask your healthcare professional. [place] [address] any warranty or liability for your use of this information.

## 2017-07-29 NOTE — ED NOTES
Assumed care of pt at this time, received bedside report from Pepper Dash, FORREST with pt included in care. Pt is resting in room, with call bell in reach. All questions answered.

## 2017-07-29 NOTE — Clinical Note
Rest, alternate ice/moist heat, gentle stretching, massage. Avoid prolonged sitting. Follow up with your Orthopedist as scheduled.

## 2017-07-30 NOTE — ED NOTES
Pt discharged with written instructions and prescriptions at this time. Pt verbalizes understanding and all questions were answered. Discharged by Chito Chen NP, in stable condition, with wife.

## 2017-07-31 DIAGNOSIS — F41.9 ANXIETY: ICD-10-CM

## 2017-07-31 RX ORDER — LORAZEPAM 1 MG/1
1 TABLET ORAL
Qty: 2 TAB | Refills: 0 | Status: SHIPPED | OUTPATIENT
Start: 2017-07-31 | End: 2017-09-01 | Stop reason: ALTCHOICE

## 2017-08-09 ENCOUNTER — TELEPHONE (OUTPATIENT)
Dept: INTERNAL MEDICINE CLINIC | Age: 37
End: 2017-08-09

## 2017-08-09 DIAGNOSIS — M25.511 ACUTE PAIN OF RIGHT SHOULDER: Primary | ICD-10-CM

## 2017-08-09 NOTE — TELEPHONE ENCOUNTER
Returned call to patient. Advised will check with Dr. Fortino Santizo regarding a referral to a counselor/ therapist.   Recently lost his father, and his mother is not doing well now. Patient is really having a hard time coping with all of the stress and emotional burden of this. Please advise who you recommend he see regarding this.

## 2017-08-10 NOTE — TELEPHONE ENCOUNTER
Patient also requested referral be faxed to Dr. Elise Magallanes with [facility]. New referral entered and provider updated.

## 2017-08-10 NOTE — TELEPHONE ENCOUNTER
MD Esteban Cintron LPN        Caller: Unspecified (Yesterday,  7:51 AM)                     I dont personally know a particular counselor but have heard positive feedback from [facility]. Info below. [facility]   [employer] [phone]   [facility] [phone])   Phone [phone] with patient and gave number for [facility] location. He will call to schedule an appointment.

## 2017-08-30 ENCOUNTER — APPOINTMENT (OUTPATIENT)
Dept: GENERAL RADIOLOGY | Age: 37
End: 2017-08-30
Attending: INTERNAL MEDICINE
Payer: COMMERCIAL

## 2017-08-30 ENCOUNTER — HOSPITAL ENCOUNTER (OUTPATIENT)
Age: 37
Setting detail: OBSERVATION
Discharge: LEFT BEFORE DISCHARGE | End: 2017-08-31
Attending: INTERNAL MEDICINE | Admitting: EMERGENCY MEDICINE
Payer: COMMERCIAL

## 2017-08-30 DIAGNOSIS — R07.89 OTHER CHEST PAIN: Primary | ICD-10-CM

## 2017-08-30 LAB
ALBUMIN SERPL-MCNC: 3.8 G/DL (ref 3.5–5)
ALBUMIN/GLOB SERPL: 1.2 {RATIO} (ref 1.1–2.2)
ALP SERPL-CCNC: 55 U/L (ref 45–117)
ALT SERPL-CCNC: 22 U/L (ref 12–78)
ANION GAP SERPL CALC-SCNC: 9 MMOL/L (ref 5–15)
AST SERPL-CCNC: 13 U/L (ref 15–37)
BASOPHILS # BLD: 0 K/UL (ref 0–0.1)
BASOPHILS NFR BLD: 0 % (ref 0–1)
BILIRUB SERPL-MCNC: 0.4 MG/DL (ref 0.2–1)
BUN SERPL-MCNC: 25 MG/DL (ref 6–20)
BUN/CREAT SERPL: 27 (ref 12–20)
CALCIUM SERPL-MCNC: 8.8 MG/DL (ref 8.5–10.1)
CHLORIDE SERPL-SCNC: 104 MMOL/L (ref 97–108)
CK SERPL-CCNC: 120 U/L (ref 39–308)
CO2 SERPL-SCNC: 26 MMOL/L (ref 21–32)
CREAT SERPL-MCNC: 0.94 MG/DL (ref 0.7–1.3)
D DIMER PPP FEU-MCNC: <0.17 MG/L FEU (ref 0–0.65)
EOSINOPHIL # BLD: 0.1 K/UL (ref 0–0.4)
EOSINOPHIL NFR BLD: 2 % (ref 0–7)
ERYTHROCYTE [DISTWIDTH] IN BLOOD BY AUTOMATED COUNT: 12.1 % (ref 11.5–14.5)
GLOBULIN SER CALC-MCNC: 3.2 G/DL (ref 2–4)
GLUCOSE SERPL-MCNC: 87 MG/DL (ref 65–100)
HCT VFR BLD AUTO: 37.5 % (ref 36.6–50.3)
HGB BLD-MCNC: 13 G/DL (ref 12.1–17)
LYMPHOCYTES # BLD: 1.9 K/UL (ref 0.8–3.5)
LYMPHOCYTES NFR BLD: 27 % (ref 12–49)
MCH RBC QN AUTO: 31 PG (ref 26–34)
MCHC RBC AUTO-ENTMCNC: 34.7 G/DL (ref 30–36.5)
MCV RBC AUTO: 89.5 FL (ref 80–99)
MONOCYTES # BLD: 0.7 K/UL (ref 0–1)
MONOCYTES NFR BLD: 10 % (ref 5–13)
NEUTS SEG # BLD: 4.3 K/UL (ref 1.8–8)
NEUTS SEG NFR BLD: 61 % (ref 32–75)
PLATELET # BLD AUTO: 230 K/UL (ref 150–400)
POTASSIUM SERPL-SCNC: 3.7 MMOL/L (ref 3.5–5.1)
PROT SERPL-MCNC: 7 G/DL (ref 6.4–8.2)
RBC # BLD AUTO: 4.19 M/UL (ref 4.1–5.7)
SODIUM SERPL-SCNC: 139 MMOL/L (ref 136–145)
TROPONIN I SERPL-MCNC: <0.04 NG/ML
WBC # BLD AUTO: 7 K/UL (ref 4.1–11.1)

## 2017-08-30 PROCEDURE — 82550 ASSAY OF CK (CPK): CPT | Performed by: INTERNAL MEDICINE

## 2017-08-30 PROCEDURE — 74011250637 HC RX REV CODE- 250/637: Performed by: INTERNAL MEDICINE

## 2017-08-30 PROCEDURE — 85379 FIBRIN DEGRADATION QUANT: CPT | Performed by: INTERNAL MEDICINE

## 2017-08-30 PROCEDURE — 36415 COLL VENOUS BLD VENIPUNCTURE: CPT | Performed by: INTERNAL MEDICINE

## 2017-08-30 PROCEDURE — 99285 EMERGENCY DEPT VISIT HI MDM: CPT

## 2017-08-30 PROCEDURE — 85025 COMPLETE CBC W/AUTO DIFF WBC: CPT | Performed by: INTERNAL MEDICINE

## 2017-08-30 PROCEDURE — 71010 XR CHEST PORT: CPT

## 2017-08-30 PROCEDURE — 84484 ASSAY OF TROPONIN QUANT: CPT | Performed by: INTERNAL MEDICINE

## 2017-08-30 PROCEDURE — 93005 ELECTROCARDIOGRAM TRACING: CPT

## 2017-08-30 PROCEDURE — 80053 COMPREHEN METABOLIC PANEL: CPT | Performed by: INTERNAL MEDICINE

## 2017-08-30 RX ORDER — NITROGLYCERIN 0.4 MG/1
0.4 TABLET SUBLINGUAL
Status: COMPLETED | OUTPATIENT
Start: 2017-08-30 | End: 2017-08-30

## 2017-08-30 RX ORDER — LORAZEPAM 1 MG/1
1 TABLET ORAL
Status: COMPLETED | OUTPATIENT
Start: 2017-08-30 | End: 2017-08-30

## 2017-08-30 RX ORDER — ASPIRIN 325 MG
325 TABLET ORAL ONCE
Status: COMPLETED | OUTPATIENT
Start: 2017-08-30 | End: 2017-08-30

## 2017-08-30 RX ORDER — ACETAMINOPHEN 500 MG
1000 TABLET ORAL ONCE
Status: COMPLETED | OUTPATIENT
Start: 2017-08-30 | End: 2017-08-30

## 2017-08-30 RX ADMIN — NITROGLYCERIN 0.4 MG: 0.4 TABLET SUBLINGUAL at 23:05

## 2017-08-30 RX ADMIN — ASPIRIN 325 MG ORAL TABLET 325 MG: 325 PILL ORAL at 23:03

## 2017-08-30 RX ADMIN — LORAZEPAM 1 MG: 1 TABLET ORAL at 23:05

## 2017-08-30 RX ADMIN — ACETAMINOPHEN 1000 MG: 500 TABLET ORAL at 23:03

## 2017-08-30 NOTE — Clinical Note
Patient Class[de-identified] Observation [196] Type of Bed: Telemetry [19] Reason for Observation: chest pain Admitting Diagnosis: Chest pain [[id number]] Admitting Physician: Neeraj Bergman Attending Physician: Neeraj Bergman

## 2017-08-31 ENCOUNTER — HOSPITAL ENCOUNTER (EMERGENCY)
Age: 37
Discharge: HOME | End: 2017-08-31
Attending: EMERGENCY MEDICINE
Payer: COMMERCIAL

## 2017-08-31 VITALS
TEMPERATURE: 98.3 F | HEART RATE: 58 BPM | SYSTOLIC BLOOD PRESSURE: 137 MMHG | DIASTOLIC BLOOD PRESSURE: 80 MMHG | RESPIRATION RATE: 16 BRPM | OXYGEN SATURATION: 97 %

## 2017-08-31 VITALS
TEMPERATURE: 97.9 F | OXYGEN SATURATION: 94 % | HEART RATE: 60 BPM | HEIGHT: 73 IN | SYSTOLIC BLOOD PRESSURE: 107 MMHG | DIASTOLIC BLOOD PRESSURE: 66 MMHG | WEIGHT: 235 LBS | RESPIRATION RATE: 11 BRPM | BODY MASS INDEX: 31.14 KG/M2

## 2017-08-31 DIAGNOSIS — F43.9 STRESS: ICD-10-CM

## 2017-08-31 DIAGNOSIS — R07.9 CHEST PAIN, UNSPECIFIED TYPE: Primary | ICD-10-CM

## 2017-08-31 LAB
ALBUMIN SERPL-MCNC: 3.9 G/DL (ref 3.5–5)
ALBUMIN/GLOB SERPL: 1.1 {RATIO} (ref 1.1–2.2)
ALP SERPL-CCNC: 56 U/L (ref 45–117)
ALT SERPL-CCNC: 19 U/L (ref 12–78)
ANION GAP SERPL CALC-SCNC: 6 MMOL/L (ref 5–15)
APPEARANCE UR: CLEAR
APTT PPP: 29.9 SEC (ref 22.1–32.5)
AST SERPL-CCNC: 13 U/L (ref 15–37)
ATRIAL RATE: 68 BPM
BASOPHILS # BLD: 0 K/UL (ref 0–0.1)
BASOPHILS NFR BLD: 0 % (ref 0–1)
BILIRUB SERPL-MCNC: 0.4 MG/DL (ref 0.2–1)
BILIRUB UR QL: NEGATIVE
BUN SERPL-MCNC: 19 MG/DL (ref 6–20)
BUN/CREAT SERPL: 26 (ref 12–20)
CALCIUM SERPL-MCNC: 8.5 MG/DL (ref 8.5–10.1)
CALCULATED P AXIS, ECG09: 4 DEGREES
CALCULATED R AXIS, ECG10: 1 DEGREES
CALCULATED T AXIS, ECG11: 5 DEGREES
CHLORIDE SERPL-SCNC: 106 MMOL/L (ref 97–108)
CK MB CFR SERPL CALC: NORMAL % (ref 0–2.5)
CK MB SERPL-MCNC: <1 NG/ML (ref 5–25)
CK SERPL-CCNC: 110 U/L (ref 39–308)
CO2 SERPL-SCNC: 27 MMOL/L (ref 21–32)
COLOR UR: NORMAL
CREAT SERPL-MCNC: 0.73 MG/DL (ref 0.7–1.3)
D DIMER PPP FEU-MCNC: 0.19 MG/L FEU (ref 0–0.65)
DIAGNOSIS, 93000: NORMAL
EOSINOPHIL # BLD: 0.2 K/UL (ref 0–0.4)
EOSINOPHIL NFR BLD: 3 % (ref 0–7)
ERYTHROCYTE [DISTWIDTH] IN BLOOD BY AUTOMATED COUNT: 12.5 % (ref 11.5–14.5)
GLOBULIN SER CALC-MCNC: 3.5 G/DL (ref 2–4)
GLUCOSE SERPL-MCNC: 81 MG/DL (ref 65–100)
GLUCOSE UR STRIP.AUTO-MCNC: NEGATIVE MG/DL
HCT VFR BLD AUTO: 40.5 % (ref 36.6–50.3)
HGB BLD-MCNC: 13.9 G/DL (ref 12.1–17)
HGB UR QL STRIP: NEGATIVE
INR PPP: 1 (ref 0.9–1.1)
KETONES UR QL STRIP.AUTO: NEGATIVE MG/DL
LEUKOCYTE ESTERASE UR QL STRIP.AUTO: NEGATIVE
LYMPHOCYTES # BLD: 1.3 K/UL (ref 0.8–3.5)
LYMPHOCYTES NFR BLD: 23 % (ref 12–49)
MCH RBC QN AUTO: 30.3 PG (ref 26–34)
MCHC RBC AUTO-ENTMCNC: 34.3 G/DL (ref 30–36.5)
MCV RBC AUTO: 88.2 FL (ref 80–99)
MONOCYTES # BLD: 0.5 K/UL (ref 0–1)
MONOCYTES NFR BLD: 10 % (ref 5–13)
NEUTS SEG # BLD: 3.7 K/UL (ref 1.8–8)
NEUTS SEG NFR BLD: 64 % (ref 32–75)
NITRITE UR QL STRIP.AUTO: NEGATIVE
P-R INTERVAL, ECG05: 156 MS
PH UR STRIP: 6 [PH] (ref 5–8)
PLATELET # BLD AUTO: 236 K/UL (ref 150–400)
POTASSIUM SERPL-SCNC: 3.8 MMOL/L (ref 3.5–5.1)
PROT SERPL-MCNC: 7.4 G/DL (ref 6.4–8.2)
PROT UR STRIP-MCNC: NEGATIVE MG/DL
PROTHROMBIN TIME: 9.6 SEC (ref 9–11.1)
Q-T INTERVAL, ECG07: 394 MS
QRS DURATION, ECG06: 88 MS
QTC CALCULATION (BEZET), ECG08: 418 MS
RBC # BLD AUTO: 4.59 M/UL (ref 4.1–5.7)
SODIUM SERPL-SCNC: 139 MMOL/L (ref 136–145)
SP GR UR REFRACTOMETRY: 1.01 (ref 1–1.03)
THERAPEUTIC RANGE,PTTT: NORMAL SECS (ref 58–77)
TROPONIN I SERPL-MCNC: <0.04 NG/ML
UROBILINOGEN UR QL STRIP.AUTO: 0.2 EU/DL (ref 0.2–1)
VENTRICULAR RATE, ECG03: 68 BPM
WBC # BLD AUTO: 5.7 K/UL (ref 4.1–11.1)

## 2017-08-31 PROCEDURE — 82550 ASSAY OF CK (CPK): CPT | Performed by: EMERGENCY MEDICINE

## 2017-08-31 PROCEDURE — 99283 EMERGENCY DEPT VISIT LOW MDM: CPT

## 2017-08-31 PROCEDURE — 85025 COMPLETE CBC W/AUTO DIFF WBC: CPT | Performed by: EMERGENCY MEDICINE

## 2017-08-31 PROCEDURE — 36415 COLL VENOUS BLD VENIPUNCTURE: CPT | Performed by: EMERGENCY MEDICINE

## 2017-08-31 PROCEDURE — 84484 ASSAY OF TROPONIN QUANT: CPT | Performed by: EMERGENCY MEDICINE

## 2017-08-31 PROCEDURE — 80053 COMPREHEN METABOLIC PANEL: CPT | Performed by: EMERGENCY MEDICINE

## 2017-08-31 PROCEDURE — 99218 HC RM OBSERVATION: CPT

## 2017-08-31 PROCEDURE — 81003 URINALYSIS AUTO W/O SCOPE: CPT | Performed by: INTERNAL MEDICINE

## 2017-08-31 PROCEDURE — 85730 THROMBOPLASTIN TIME PARTIAL: CPT | Performed by: EMERGENCY MEDICINE

## 2017-08-31 PROCEDURE — 85379 FIBRIN DEGRADATION QUANT: CPT | Performed by: EMERGENCY MEDICINE

## 2017-08-31 PROCEDURE — 93005 ELECTROCARDIOGRAM TRACING: CPT

## 2017-08-31 PROCEDURE — 85610 PROTHROMBIN TIME: CPT | Performed by: EMERGENCY MEDICINE

## 2017-08-31 RX ORDER — NITROGLYCERIN 0.4 MG/1
0.4 TABLET SUBLINGUAL
Status: DISCONTINUED | OUTPATIENT
Start: 2017-08-31 | End: 2017-08-31 | Stop reason: HOSPADM

## 2017-08-31 RX ORDER — MORPHINE SULFATE 2 MG/ML
4 INJECTION, SOLUTION INTRAMUSCULAR; INTRAVENOUS
Status: DISCONTINUED | OUTPATIENT
Start: 2017-08-31 | End: 2017-08-31 | Stop reason: HOSPADM

## 2017-08-31 RX ORDER — GABAPENTIN 300 MG/1
900 CAPSULE ORAL 2 TIMES DAILY
Status: DISCONTINUED | OUTPATIENT
Start: 2017-08-31 | End: 2017-08-31 | Stop reason: HOSPADM

## 2017-08-31 RX ORDER — ENOXAPARIN SODIUM 100 MG/ML
40 INJECTION SUBCUTANEOUS DAILY
Status: DISCONTINUED | OUTPATIENT
Start: 2017-08-31 | End: 2017-08-31 | Stop reason: HOSPADM

## 2017-08-31 RX ORDER — ASPIRIN 325 MG
325 TABLET ORAL DAILY
Status: DISCONTINUED | OUTPATIENT
Start: 2017-08-31 | End: 2017-08-31 | Stop reason: HOSPADM

## 2017-08-31 RX ORDER — MORPHINE SULFATE 2 MG/ML
2 INJECTION, SOLUTION INTRAMUSCULAR; INTRAVENOUS
Status: DISCONTINUED | OUTPATIENT
Start: 2017-08-31 | End: 2017-08-31 | Stop reason: HOSPADM

## 2017-08-31 RX ORDER — SODIUM CHLORIDE 0.9 % (FLUSH) 0.9 %
5-10 SYRINGE (ML) INJECTION EVERY 8 HOURS
Status: DISCONTINUED | OUTPATIENT
Start: 2017-08-31 | End: 2017-08-31 | Stop reason: HOSPADM

## 2017-08-31 RX ORDER — MELATONIN
5000 DAILY
Status: DISCONTINUED | OUTPATIENT
Start: 2017-08-31 | End: 2017-08-31 | Stop reason: HOSPADM

## 2017-08-31 RX ORDER — KETOROLAC TROMETHAMINE 30 MG/ML
15 INJECTION, SOLUTION INTRAMUSCULAR; INTRAVENOUS
Status: DISCONTINUED | OUTPATIENT
Start: 2017-08-31 | End: 2017-08-31 | Stop reason: HOSPADM

## 2017-08-31 RX ORDER — METHOCARBAMOL 500 MG/1
1000 TABLET, FILM COATED ORAL 3 TIMES DAILY
Status: DISCONTINUED | OUTPATIENT
Start: 2017-08-31 | End: 2017-08-31 | Stop reason: HOSPADM

## 2017-08-31 RX ORDER — SODIUM CHLORIDE 0.9 % (FLUSH) 0.9 %
5-10 SYRINGE (ML) INJECTION AS NEEDED
Status: DISCONTINUED | OUTPATIENT
Start: 2017-08-31 | End: 2017-08-31 | Stop reason: HOSPADM

## 2017-08-31 RX ORDER — TRAMADOL HYDROCHLORIDE 50 MG/1
100 TABLET ORAL
Status: DISCONTINUED | OUTPATIENT
Start: 2017-08-31 | End: 2017-08-31 | Stop reason: HOSPADM

## 2017-08-31 RX ORDER — HYDROXYZINE PAMOATE 25 MG/1
25 CAPSULE ORAL
Qty: 20 CAP | Refills: 0 | Status: SHIPPED | OUTPATIENT
Start: 2017-08-31 | End: 2017-09-01

## 2017-08-31 RX ORDER — ACETAMINOPHEN 325 MG/1
650 TABLET ORAL
Status: DISCONTINUED | OUTPATIENT
Start: 2017-08-31 | End: 2017-08-31 | Stop reason: HOSPADM

## 2017-08-31 RX ORDER — LORAZEPAM 1 MG/1
1 TABLET ORAL
Status: DISCONTINUED | OUTPATIENT
Start: 2017-08-31 | End: 2017-08-31 | Stop reason: HOSPADM

## 2017-08-31 NOTE — DISCHARGE INSTRUCTIONS
Chest Pain: Care Instructions  Your Care Instructions  There are many things that can cause chest pain. Some are not serious and will get better on their own in a few days. But some kinds of chest pain need more testing and treatment. Your doctor may have recommended a follow-up visit in the next 8 to 12 hours. If you are not getting better, you may need more tests or treatment. Even though your doctor has released you, you still need to watch for any problems. The doctor carefully checked you, but sometimes problems can develop later. If you have new symptoms or if your symptoms do not get better, get medical care right away. If you have worse or different chest pain or pressure that lasts more than 5 minutes or you passed out (lost consciousness), call 911 or seek other emergency help right away. A medical visit is only one step in your treatment. Even if you feel better, you still need to do what your doctor recommends, such as going to all suggested follow-up appointments and taking medicines exactly as directed. This will help you recover and help prevent future problems. How can you care for yourself at home? · Rest until you feel better. · Take your medicine exactly as prescribed. Call your doctor if you think you are having a problem with your medicine. · Do not drive after taking a prescription pain medicine. When should you call for help? Call 911 if:  · You passed out (lost consciousness). · You have severe difficulty breathing. · You have symptoms of a heart attack. These may include:  ¨ Chest pain or pressure, or a strange feeling in your chest.  ¨ Sweating. ¨ Shortness of breath. ¨ Nausea or vomiting. ¨ Pain, pressure, or a strange feeling in your back, neck, jaw, or upper belly or in one or both shoulders or arms. ¨ Lightheadedness or sudden weakness. ¨ A fast or irregular heartbeat.   After you call 911, the  may tell you to chew 1 adult-strength or 2 to 4 low-dose aspirin. Wait for an ambulance. Do not try to drive yourself. Call your doctor today if:  · You have any trouble breathing. · Your chest pain gets worse. · You are dizzy or lightheaded, or you feel like you may faint. · You are not getting better as expected. · You are having new or different chest pain. Where can you learn more? Go to http://dell-sherie.info/. Enter A120 in the search box to learn more about \"Chest Pain: Care Instructions. \"  Current as of: March 20, 2017  Content Version: 11.3  © 3761-6498 [employer]. Care instructions adapted under license by [employer] (which disclaims liability or warranty for this information). If you have questions about a medical condition or this instruction, always ask your healthcare professional. [address] any warranty or liability for your use of this information.

## 2017-08-31 NOTE — ED PROVIDER NOTES
HPI Comments: Austin Baptiste is a 42 y.o. male, with PMHx significant for chronic back pain, HTN, concussion, alcoholism, who presents ambulatory to the ED with cc of sudden onset midsternal CP radiating to upper back, since 1 hour ago. Pt rated the pain initially a 10/10, and now states his CP is 5/10 and describes it as a sharp pain. Pt also notes associated symptoms of SOB. He reports his mom passed away a week ago and his father passed away \"several months ago\" as well, and has been in a dysphoric mood. He also notes he has \"not been sleeping well\", and has been walking around the house screaming and crying, looking for things that aren't there. He states he just got back to work this week, after being off for a month, and notes he has been working long 12 hour shifts. Pt denies having any recent injury. He states he takes medication for his chronic back pain and HTN. Pt specifically denies abdominal pain, nausea, vomiting, diarrhea. FMHx of heart disease  PSHx of spinal fusion    Social hx: + Tobacco use (vape), + EtOH use (former), - Illicit drug use    PCP: Daija Musa MD    There are no other complaints, changes or physical findings at this time. The history is provided by the patient. No  was used.         Past Medical History:   Diagnosis Date    Alcoholism ([address]     in remission    Back pain     Chronic pain     back    Concussion     H/O spinal fusion     15 years ago    Hypertension        Past Surgical History:   Procedure Laterality Date    HX ORTHOPAEDIC      Spinal fusion L5 S1 in 2003         Family History:   Problem Relation Age of Onset    Diabetes Mother     Hypertension Mother     Thyroid Disease Mother      hypothyroidism    Heart Attack Mother      age 64-smoker    Mammie Muster Arthritis-osteo Mother     Asthma Mother     Heart Disease Mother     Elevated Lipids Father     Heart Disease Father     Hypertension Father     Diabetes Father    Mammie Muster Cancer Father      prostate    Heart Attack Father      in 52's   Jazmine Walker Alcohol abuse Father     Arthritis-osteo Father     Psychiatric Disorder Father        Social History     Social History    Marital status:      Spouse name: N/A    Number of children: N/A    Years of education: N/A     Occupational History    Not on file. Social History Main Topics    Smoking status: Former Smoker     Packs/day: 1.00     Years: 10.00     Types: Cigarettes     Quit date: 4/8/2014    Smokeless tobacco: Never Used    Alcohol use No    Drug use: No    Sexual activity: Yes     Partners: Female     Birth control/ protection: None     Other Topics Concern    Not on file     Social History Narrative         ALLERGIES: Review of patient's allergies indicates no known allergies. Review of Systems   Constitutional: Negative. Negative for chills and fever. HENT: Negative. Negative for congestion, rhinorrhea and sinus pressure. Eyes: Negative. Negative for discharge and redness. Respiratory: Positive for shortness of breath. Negative for chest tightness. Cardiovascular: Positive for chest pain (midsternal). Gastrointestinal: Negative. Negative for abdominal pain, blood in stool, diarrhea, nausea and vomiting. Endocrine: Negative. Genitourinary: Negative. Negative for flank pain and hematuria. Musculoskeletal: Positive for back pain (upper). Skin: Negative. Negative for rash. Neurological: Negative. Negative for dizziness, seizures, weakness, numbness and headaches. Hematological: Negative. Psychiatric/Behavioral: Positive for dysphoric mood and sleep disturbance. All other systems reviewed and are negative.     Patient Vitals for the past 12 hrs:   Temp Pulse Resp BP SpO2   08/31/17 0100 - 60 11 107/66 94 %   08/31/17 0030 - (!) 58 13 116/70 97 %   08/31/17 0000 - 66 16 120/75 98 %   08/30/17 2330 - 60 15 117/77 96 %   08/30/17 2305 - 63 18 (!) 139/98 98 %   08/30/17 2213 - - - 151/86 -   08/30/17 2212 97.9 °F (36.6 °C) 70 20 - 100 %         Physical Exam   Constitutional: He is oriented to person, place, and time. He appears well-developed and well-nourished. HENT:   Head: Normocephalic and atraumatic. Mouth/Throat: Oropharynx is clear and moist.   Eyes: Conjunctivae and EOM are normal. Pupils are equal, round, and reactive to light. Neck: Normal range of motion. Neck supple. Cardiovascular: Normal rate, regular rhythm and normal heart sounds. Exam reveals no gallop and no friction rub. No murmur heard. Pulmonary/Chest: Effort normal and breath sounds normal. No respiratory distress. He has no wheezes. He has no rales. Abdominal: Soft. Bowel sounds are normal. He exhibits no distension. There is no tenderness. There is no rebound and no guarding. Musculoskeletal: Normal range of motion. He exhibits no edema or tenderness. Lymphadenopathy:     He has no cervical adenopathy. Neurological: He is alert and oriented to person, place, and time. He has normal strength. No cranial nerve deficit or sensory deficit. He displays a negative Romberg sign. Coordination and gait normal.   Skin: Skin is warm and dry. No ecchymosis, no lesion and no rash noted. Rash is not urticarial. He is not diaphoretic. No erythema. Psychiatric: He has a normal mood and affect. Nursing note and vitals reviewed.        MDM  Number of Diagnoses or Management Options  Other chest pain:   Diagnosis management comments: DDx: typical vs atypical CP, stress, anxiety       Amount and/or Complexity of Data Reviewed  Clinical lab tests: ordered and reviewed  Tests in the radiology section of CPT®: ordered and reviewed  Tests in the medicine section of CPT®: ordered and reviewed  Review and summarize past medical records: yes  Discuss the patient with other providers: yes (Hospitalist)  Independent visualization of images, tracings, or specimens: yes    Patient Progress  Patient progress: stable    ED Course       Procedures    EKG interpretation: (Preliminary)  10:21 PM  Rhythm: normal sinus rhythm; and regular . Rate (approx.): 68; Axis: normal; CO interval: normal; QRS interval: normal ; ST/T wave: normal; Other findings: non-ischemic. SIGN OUT:  12:10 AM  Patient's presentation, labs/imaging and plan of care was reviewed with Shelton Hammer MD as part of sign out. They will re-assess pt and discuss plan of care as part of the plan discussed with the patient. Shelton Hammer MD's assistance in completion of this plan is greatly appreciated but it should be noted that I will be the provider of record for this patient. Herrera Pacheco MD    ATTESTATION:  This note is prepared by Elise De La Torre, acting as Scribe for Herrera Pacheco MD.    Herrera Pacheco MD: The scribe's documentation has been prepared under my direction and personally reviewed by me in its entirety. I confirm that the note above accurately reflects all work, treatment, procedures, and medical decision making performed by me. Progress Note:  1:00 AM  Pt has declined admission to hospital and states that he would like to be discharged home instead. He denies having any pain at this time and states that his pain lasted approximately 1 hour. Pt states that he plans to follow up with his PCP as soon as possible.     Written by Andi Garg ED Scribe, as dictated by Shelton Hammer MD.       LABORATORY TESTS:  Recent Results (from the past 12 hour(s))   CK W/ REFLX CKMB    Collection Time: 08/30/17 11:05 PM   Result Value Ref Range     39 - 308 U/L   TROPONIN I    Collection Time: 08/30/17 11:05 PM   Result Value Ref Range    Troponin-I, Qt. <0.04 <3.63 ng/mL   METABOLIC PANEL, COMPREHENSIVE    Collection Time: 08/30/17 11:05 PM   Result Value Ref Range    Sodium 139 136 - 145 mmol/L    Potassium 3.7 3.5 - 5.1 mmol/L    Chloride 104 97 - 108 mmol/L    CO2 26 21 - 32 mmol/L    Anion gap 9 5 - 15 mmol/L    Glucose 87 65 - 100 mg/dL    BUN 25 (H) 6 - 20 MG/DL    Creatinine 0.94 0.70 - 1.30 MG/DL    BUN/Creatinine ratio 27 (H) 12 - 20      GFR est AA >60 >60 ml/min/1.73m2    GFR est non-AA >60 >60 ml/min/1.73m2    Calcium 8.8 8.5 - 10.1 MG/DL    Bilirubin, total 0.4 0.2 - 1.0 MG/DL    ALT (SGPT) 22 12 - 78 U/L    AST (SGOT) 13 (L) 15 - 37 U/L    Alk. phosphatase 55 45 - 117 U/L    Protein, total 7.0 6.4 - 8.2 g/dL    Albumin 3.8 3.5 - 5.0 g/dL    Globulin 3.2 2.0 - 4.0 g/dL    A-G Ratio 1.2 1.1 - 2.2     CBC WITH AUTOMATED DIFF    Collection Time: 08/30/17 11:05 PM   Result Value Ref Range    WBC 7.0 4.1 - 11.1 K/uL    RBC 4.19 4. 10 - 5.70 M/uL    HGB 13.0 12.1 - 17.0 g/dL    HCT 37.5 36.6 - 50.3 %    MCV 89.5 80.0 - 99.0 FL    MCH 31.0 26.0 - 34.0 PG    MCHC 34.7 30.0 - 36.5 g/dL    RDW 12.1 11.5 - 14.5 %    PLATELET 112 353 - 952 K/uL    NEUTROPHILS 61 32 - 75 %    LYMPHOCYTES 27 12 - 49 %    MONOCYTES 10 5 - 13 %    EOSINOPHILS 2 0 - 7 %    BASOPHILS 0 0 - 1 %    ABS. NEUTROPHILS 4.3 1.8 - 8.0 K/UL    ABS. LYMPHOCYTES 1.9 0.8 - 3.5 K/UL    ABS. MONOCYTES 0.7 0.0 - 1.0 K/UL    ABS. EOSINOPHILS 0.1 0.0 - 0.4 K/UL    ABS. BASOPHILS 0.0 0.0 - 0.1 K/UL   D DIMER    Collection Time: 08/30/17 11:05 PM   Result Value Ref Range    D-dimer <0.17 0.00 - 0.65 mg/L FEU   URINALYSIS W/ RFLX MICROSCOPIC    Collection Time: 08/31/17 12:33 AM   Result Value Ref Range    Color YELLOW/STRAW      Appearance CLEAR CLEAR      Specific gravity 1.010 1.003 - 1.030      pH (UA) 6.0 5.0 - 8.0      Protein NEGATIVE  NEG mg/dL    Glucose NEGATIVE  NEG mg/dL    Ketone NEGATIVE  NEG mg/dL    Bilirubin NEGATIVE  NEG      Blood NEGATIVE  NEG      Urobilinogen 0.2 0.2 - 1.0 EU/dL    Nitrites NEGATIVE  NEG      Leukocyte Esterase NEGATIVE  NEG         IMAGING RESULTS:  CXR Results  (Last 48 hours)               08/30/17 2333  XR CHEST PORT Final result    Impression:  IMPRESSION: No Acute Disease. Narrative:  EXAM: Portable CXR.   Approximately 2328 hours INDICATION: Mid chest pain for 30 minutes. The lungs are clear. Heart is normal in size. There is no overt pulmonary edema. There is no evident pneumothorax, apparent adenopathy or sizable pleural   effusion. MEDICATIONS GIVEN:  Medications   sodium chloride (NS) flush 5-10 mL (not administered)   sodium chloride (NS) flush 5-10 mL (not administered)   nitroglycerin (NITROSTAT) tablet 0.4 mg (not administered)   acetaminophen (TYLENOL) tablet 650 mg (not administered)   morphine injection 2 mg (not administered)   enoxaparin (LOVENOX) injection 40 mg (not administered)   gabapentin (NEURONTIN) capsule 900 mg (not administered)   traMADol (ULTRAM) tablet 100 mg (not administered)   methocarbamol (ROBAXIN) tablet 1,000 mg (not administered)   cholecalciferol (VITAMIN D3) tablet 5,000 Units (not administered)   LORazepam (ATIVAN) tablet 1 mg (not administered)   aspirin (ASPIRIN) tablet 325 mg (not administered)   aspirin (ASPIRIN) tablet 325 mg (325 mg Oral Given 8/30/17 2303)   LORazepam (ATIVAN) tablet 1 mg (1 mg Oral Given 8/30/17 2305)   nitroglycerin (NITROSTAT) tablet 0.4 mg (0.4 mg SubLINGual Given 8/30/17 2305)   acetaminophen (TYLENOL) tablet 1,000 mg (1,000 mg Oral Given 8/30/17 2303)       IMPRESSION:  1. Other chest pain        PLAN:  1. Discharge Medication List as of 8/31/2017  1:11 AM        2. Follow-up Information     Follow up With Details Comments [id number] [address]  [address] [place] [id number]          Return to ED if worse     1:00 AM  I informed the pt that He needed admission for adequate evaluation for his CP and SOB, that by refusing admission He is at risk for MI, arrhythmia, or sudden death. He is awake, alert, He understands his condition and the risks involved in leaving.  While the patient has received 1 mg Ativan, it has been 2 hours since last receiving this medication when having this conversation and is clinically aware of their surroundings and able to ask appropriate questions, the patients spouse/SO and the nurse present confirms He is not clinically intoxicated and able to make medical decisions. He verbalized knowing the same risks and understood it was recommended that He stay and could also return at any time. his spouse/SO was present for the discussion and also verbalized that He understood both diagnosis, risks and could return at any time. They were both provided with warnings regarding worsening of his condition and were provided instructions to follow up with MD Gretchen  Tomorrow or return to the Emergency Room as soon as possible. This discussion was witnessed by nurse Michael Silveira RN.     This note is prepared by Signe Bloch, acting as Scribe for Jennifer Rodriguez MD.    Jennifer Rodriguez MD: The scribe's documentation has been prepared under my direction and personally reviewed by me in its entirety. I confirm that the note above accurately reflects all work, treatment, procedures, and medical decision making performed by me.

## 2017-08-31 NOTE — ED NOTES
Pt was seen, and deemed safe for discharge by ED provider. ED provider discussed discharge instructions with the patient and all questioned fully answered. VSS. LDA removed. Pt ambulatory leaving ED.

## 2017-08-31 NOTE — ED NOTES
Pt C/O chest pain. Described as \"a sharp pressure\" across his chest and radiating to the back. Mild SOB.

## 2017-08-31 NOTE — ED PROVIDER NOTES
HPI Comments: Alyssa Jain is a 42 y.o. male who presents via EMS to the ED with cc of gradually worsening left sided pressure like CP that onset last night. He reports associated SOB, nausea, diaphoresis, as well as dizziness that onset this morning when he woke up. Pt denies use of medication to modify his symptoms. He notes that he has been under significant stress recently due to the loss of his mother 2 weeks ago and the loss of his father 4 months ago. Per chart review pt was seen at [address] last night for sudden onset of jaw pain that radiated to his chest and back. He reports that he was offered admission but states that he did not want to stay in the hospital. Pt notes that he received ASA, NTG, tylenol, and ativan with improvement of his symptoms last night. He denies any vomiting, hemoptysis, wheezing, abdominal pain, leg swelling, fevers, or chills. Social history significant for: - Tobacco, - EtOH, - Illicit drug use  Past medical history significant for: back pain, HTN, concussion, alcoholism, spinal fusion  PCP: Geetha Chadwick MD    There are no other complaints, changes or physical findings at this time. Written by INOCENTE Vargheseibjoo, as dictated by Vika Garcia MD        The history is provided by the patient and the EMS personnel. No  was used.         Past Medical History:   Diagnosis Date    Alcoholism ([address]     in remission    Back pain     Chronic pain     back    Concussion     H/O spinal fusion     15 years ago    Hypertension        Past Surgical History:   Procedure Laterality Date    HX ORTHOPAEDIC      Spinal fusion L5 S1 in 2003         Family History:   Problem Relation Age of Onset    Diabetes Mother     Hypertension Mother     Thyroid Disease Mother      hypothyroidism    Heart Attack Mother      age 64-smoker    Kristen Plascenciaer Arthritis-osteo Mother     Asthma Mother     Heart Disease Mother     Elevated Lipids Father     Heart Disease Father     Hypertension Father     Diabetes Father     Cancer Father      prostate    Heart Attack Father      in 54's    Alcohol abuse Father     Arthritis-osteo Father     Psychiatric Disorder Father        Social History     Social History    Marital status:      Spouse name: N/A    Number of children: N/A    Years of education: N/A     Occupational History    Not on file. Social History Main Topics    Smoking status: Former Smoker     Packs/day: 1.00     Years: 10.00     Types: Cigarettes     Quit date: 4/8/2014    Smokeless tobacco: Never Used    Alcohol use No    Drug use: No    Sexual activity: Yes     Partners: Female     Birth control/ protection: None     Other Topics Concern    Not on file     Social History Narrative         ALLERGIES: Review of patient's allergies indicates no known allergies. Review of Systems   Constitutional: Positive for diaphoresis. Negative for chills, fever and unexpected weight change. HENT: Negative for rhinorrhea and sore throat. Eyes: Negative for pain. Respiratory: Positive for shortness of breath. Negative for wheezing and stridor.         - hemoptysis   Cardiovascular: Positive for chest pain. Negative for leg swelling. Gastrointestinal: Positive for nausea. Negative for abdominal pain, blood in stool, diarrhea and vomiting. Genitourinary: Negative for difficulty urinating, dysuria and flank pain. Musculoskeletal: Negative for back pain and neck stiffness. Skin: Negative for rash. Neurological: Negative for seizures, syncope, weakness, light-headedness and headaches. Psychiatric/Behavioral: Negative for confusion. Patient Vitals for the past 12 hrs:   Temp Pulse Resp BP SpO2   08/31/17 1543 - (!) 58 16 137/80 97 %   08/31/17 1255 98.3 °F (36.8 °C) 78 20 136/89 97 %     Physical Exam   Constitutional: He is oriented to person, place, and time. He appears well-developed and well-nourished. No distress.    HENT:   Nose: Nose normal.   Mouth/Throat: Oropharynx is clear and moist. No oropharyngeal exudate. Eyes: Conjunctivae and EOM are normal. Pupils are equal, round, and reactive to light. Right eye exhibits no discharge. Left eye exhibits no discharge. No scleral icterus. Neck: Normal range of motion. Neck supple. No JVD present. Cardiovascular: Normal rate, regular rhythm, normal heart sounds and intact distal pulses. No murmur heard. Pulmonary/Chest: Effort normal and breath sounds normal. No stridor. No respiratory distress. He has no wheezes. He has no rales. Abdominal: Soft. Bowel sounds are normal. He exhibits no distension. There is no tenderness. There is no rebound and no guarding. Musculoskeletal: He exhibits no edema or tenderness. Neurological: He is alert and oriented to person, place, and time. Skin: Skin is warm and dry. He is not diaphoretic. Psychiatric: His mood appears anxious. Nursing note and vitals reviewed. MDM  Number of Diagnoses or Management Options  Chest pain, unspecified type:   Stress:   Diagnosis management comments:   CP and dyspnea that appear to be psychosomatic in nature. Work up from last night and today are unremarkable with negative troponin, negative D-dimer, and clear CXR. Pt clinically unimpressive with normal vital signs. Pt is currently being followed by mental health and has follow up next week. Stable for discharge       Amount and/or Complexity of Data Reviewed  Clinical lab tests: ordered and reviewed  Tests in the medicine section of CPT®: ordered and reviewed  Obtain history from someone other than the patient: yes (  EMS)  Review and summarize past medical records: yes  Independent visualization of images, tracings, or specimens: yes    Patient Progress  Patient progress: stable        Procedures    EKG interpretation: (Preliminary) 1331  Rhythm: normal sinus rhythm; and regular .  Rate (approx.): 60; Axis: normal; P wave: normal; QRS interval: normal ; ST/T wave: normal;   Written by INOCENTE Amos, as dictated by Fiona Roth MD.    PROGRESS NOTE:  3:34 PM  Pt has been re-evaluated. Pt adds that he has been getting very little sleep, noting that some time he hallucinates secondary to sleep deprivation, and has been stressed out due to loss of mother and father. Pt reports hx of anger issues and says that he takes it out by beating a tree with a golf club. He denies any SI/HI. States that he has started seeing mental health provider last week for the first time and has an appointment 09/05/2017. Written by INOCENTE Amos, as dictated by Fiona Roth MD    LABORATORY TESTS:  Recent Results (from the past 12 hour(s))   CBC WITH AUTOMATED DIFF    Collection Time: 08/31/17  1:07 PM   Result Value Ref Range    WBC 5.7 4.1 - 11.1 K/uL    RBC 4.59 4. 10 - 5.70 M/uL    HGB 13.9 12.1 - 17.0 g/dL    HCT 40.5 36.6 - 50.3 %    MCV 88.2 80.0 - 99.0 FL    MCH 30.3 26.0 - 34.0 PG    MCHC 34.3 30.0 - 36.5 g/dL    RDW 12.5 11.5 - 14.5 %    PLATELET 491 552 - 879 K/uL    NEUTROPHILS 64 32 - 75 %    LYMPHOCYTES 23 12 - 49 %    MONOCYTES 10 5 - 13 %    EOSINOPHILS 3 0 - 7 %    BASOPHILS 0 0 - 1 %    ABS. NEUTROPHILS 3.7 1.8 - 8.0 K/UL    ABS. LYMPHOCYTES 1.3 0.8 - 3.5 K/UL    ABS. MONOCYTES 0.5 0.0 - 1.0 K/UL    ABS. EOSINOPHILS 0.2 0.0 - 0.4 K/UL    ABS.  BASOPHILS 0.0 0.0 - 0.1 K/UL   CK W/ CKMB & INDEX    Collection Time: 08/31/17  1:07 PM   Result Value Ref Range     39 - 308 U/L    CK - MB <1.0 <3.6 NG/ML    CK-MB Index Cannot be calculated 0 - 2.5     METABOLIC PANEL, COMPREHENSIVE    Collection Time: 08/31/17  1:07 PM   Result Value Ref Range    Sodium 139 136 - 145 mmol/L    Potassium 3.8 3.5 - 5.1 mmol/L    Chloride 106 97 - 108 mmol/L    CO2 27 21 - 32 mmol/L    Anion gap 6 5 - 15 mmol/L    Glucose 81 65 - 100 mg/dL    BUN 19 6 - 20 MG/DL    Creatinine 0.73 0.70 - 1.30 MG/DL    BUN/Creatinine ratio 26 (H) 12 - 20      GFR est AA >60 >60 ml/min/1.73m2    GFR est non-AA >60 >60 ml/min/1.73m2    Calcium 8.5 8.5 - 10.1 MG/DL    Bilirubin, total 0.4 0.2 - 1.0 MG/DL    ALT (SGPT) 19 12 - 78 U/L    AST (SGOT) 13 (L) 15 - 37 U/L    Alk. phosphatase 56 45 - 117 U/L    Protein, total 7.4 6.4 - 8.2 g/dL    Albumin 3.9 3.5 - 5.0 g/dL    Globulin 3.5 2.0 - 4.0 g/dL    A-G Ratio 1.1 1.1 - 2.2     TROPONIN I    Collection Time: 08/31/17  1:07 PM   Result Value Ref Range    Troponin-I, Qt. <0.04 <0.05 ng/mL   D DIMER    Collection Time: 08/31/17  1:07 PM   Result Value Ref Range    D-dimer 0.19 0.00 - 0.65 mg/L FEU   PROTHROMBIN TIME + INR    Collection Time: 08/31/17  1:07 PM   Result Value Ref Range    INR 1.0 0.9 - 1.1      Prothrombin time 9.6 9.0 - 11.1 sec   PTT    Collection Time: 08/31/17  1:07 PM   Result Value Ref Range    aPTT 29.9 22.1 - 32.5 sec    aPTT, therapeutic range     58.0 - 77.0 SECS   EKG, 12 LEAD, INITIAL    Collection Time: 08/31/17  1:31 PM   Result Value Ref Range    Ventricular Rate 60 BPM    Atrial Rate 60 BPM    P-R Interval 176 ms    QRS Duration 90 ms    Q-T Interval 410 ms    QTC Calculation (Bezet) 410 ms    Calculated P Axis 8 degrees    Calculated R Axis -7 degrees    Calculated T Axis -2 degrees    Diagnosis       Normal sinus rhythm  Normal ECG  When compared with ECG of 30-AUG-2017 22:21,  No significant change was found       MEDICATIONS GIVEN:  Medications   morphine injection 4 mg (not administered)   ketorolac (TORADOL) injection 15 mg (not administered)       IMPRESSION:  1. Chest pain, unspecified type    2. Stress        PLAN:  1.    Discharge Medication List as of 8/31/2017  4:07 PM      START taking these medications    Details   hydrOXYzine pamoate (VISTARIL) 25 mg capsule Take 1 Cap by mouth three (3) times daily as needed for Itching., Print, Disp-20 Cap, R-0         CONTINUE these medications which have NOT CHANGED    Details   LORazepam (ATIVAN) 1 mg tablet Take 1 Tab by mouth once as needed for Anxiety for up to 1 dose. Max Daily Amount: 1 mg., Print, Disp-2 Tab, R-0      predniSONE (STERAPRED) 5 mg dose pack See administration instruction per 5mg dose pack, Print, Disp-21 Tab, R-0      HYDROcodone-acetaminophen (NORCO) 5-325 mg per tablet Take 1 Tab by mouth every six (6) hours as needed for Pain for up to 15 doses. Max Daily Amount: 4 Tabs., Print, Disp-15 Tab, R-0      gabapentin (NEURONTIN) 300 mg capsule Take 3 Caps by mouth two (2) times a day., Normal, Disp-180 Cap, R-3      traMADol (ULTRAM) 50 mg tablet Take 1 Tab by mouth every eight (8) hours as needed for Pain. Max Daily Amount: 150 mg., Print, Disp-90 Tab, R-1      diclofenac (VOLTAREN) 1 % gel Apply 4 g to affected area four (4) times daily. , Normal, Disp-1 Each, R-5      methocarbamol (ROBAXIN-750) 750 mg tablet Take 1 Tab by mouth four (4) times daily as needed. Indications: MUSCLE SPASM, Print, Disp-16 Tab, R-0      cholecalciferol, VITAMIN D3, (VITAMIN D3) 5,000 unit tab tablet Take 1 Tab by mouth daily. , Normal, Disp-90 Tab, R-3           2. Follow-up Information     Follow up With Details Comments Contact Masood Justin MD Call in 2 days  [address] [facility]  [phone]      [facility] EMERGENCY DEPT  As needed, If symptoms worsen [address]  [phone]        Return to ED if worse     DISCHARGE NOTE  4:04 PM  The patient has been re-evaluated and is ready for discharge. Reviewed available results with patient. Counseled patient on diagnosis and care plan. Patient has expressed understanding, and all questions have been answered. Patient agrees with plan and agrees to follow up as recommended, or return to the ED if their symptoms worsen. Discharge instructions have been provided and explained to the patient, along with reasons to return to the ED.     This note is prepared by Jany Thomas, acting as Scribe for Juan Manuel La MD.    Juan Manuel La MD: The saul's documentation has been prepared under my direction and personally reviewed by me in its entirety. I confirm that the note above accurately reflects all work, treatment, procedures, and medical decision making performed by me.

## 2017-08-31 NOTE — ED NOTES
Pt understands that the MD would like to admit pt to monitor his heart and for further cardiac testing. Pt states that he does not wish for admission at this time and will follow up with his primary care provider. Pt signed informed refusal for admission of his own volition and with a clear head, pt female visitor and primary RN at bedside witnessed this conversation. Pt made aware he will be discharged, states he is happy with this plan of care.

## 2017-08-31 NOTE — DISCHARGE INSTRUCTIONS
Chest Pain: Care Instructions  Your Care Instructions  There are many things that can cause chest pain. Some are not serious and will get better on their own in a few days. But some kinds of chest pain need more testing and treatment. Your doctor may have recommended a follow-up visit in the next 8 to 12 hours. If you are not getting better, you may need more tests or treatment. Even though your doctor has released you, you still need to watch for any problems. The doctor carefully checked you, but sometimes problems can develop later. If you have new symptoms or if your symptoms do not get better, get medical care right away. If you have worse or different chest pain or pressure that lasts more than 5 minutes or you passed out (lost consciousness), call 911 or seek other emergency help right away. A medical visit is only one step in your treatment. Even if you feel better, you still need to do what your doctor recommends, such as going to all suggested follow-up appointments and taking medicines exactly as directed. This will help you recover and help prevent future problems. How can you care for yourself at home? · Rest until you feel better. · Take your medicine exactly as prescribed. Call your doctor if you think you are having a problem with your medicine. · Do not drive after taking a prescription pain medicine. When should you call for help? Call 911 if:  · You passed out (lost consciousness). · You have severe difficulty breathing. · You have symptoms of a heart attack. These may include:  ¨ Chest pain or pressure, or a strange feeling in your chest.  ¨ Sweating. ¨ Shortness of breath. ¨ Nausea or vomiting. ¨ Pain, pressure, or a strange feeling in your back, neck, jaw, or upper belly or in one or both shoulders or arms. ¨ Lightheadedness or sudden weakness. ¨ A fast or irregular heartbeat.   After you call 911, the  may tell you to chew 1 adult-strength or 2 to 4 low-dose aspirin. Wait for an ambulance. Do not try to drive yourself. Call your doctor today if:  · You have any trouble breathing. · Your chest pain gets worse. · You are dizzy or lightheaded, or you feel like you may faint. · You are not getting better as expected. · You are having new or different chest pain. Where can you learn more? Go to http://dell-sherie.info/. Enter A120 in the search box to learn more about \"Chest Pain: Care Instructions. \"  Current as of: March 20, 2017  Content Version: 11.3  © 6270-2590 [employer]. Care instructions adapted under license by [employer] (which disclaims liability or warranty for this information). If you have questions about a medical condition or this instruction, always ask your healthcare professional. [address] any warranty or liability for your use of this information.

## 2017-08-31 NOTE — ED NOTES
Patient given copy of dc instructions. Patient verbalized understanding of instructions. Patient given a current medication reconciliation form and verbalized understanding of their medications. Patient verbalized understanding of the importance of discussing medications with  his or her physician or clinic when they follow up. Patient alert and oriented and in no acute distress. Pt verbalizes pain scale of 2 out of 10. Patient discharged home ambulatory without assistance. Wheelchair declined.

## 2017-08-31 NOTE — ED NOTES
Pt stated that he did not wish to be admitted to the hospital. Dr. Vic Balbuena advised and spoke with the pt. Pt to be discharged. Informed refusal signed.

## 2017-09-01 ENCOUNTER — OFFICE VISIT (OUTPATIENT)
Dept: INTERNAL MEDICINE CLINIC | Age: 37
End: 2017-09-01

## 2017-09-01 ENCOUNTER — TELEPHONE (OUTPATIENT)
Dept: INTERNAL MEDICINE CLINIC | Age: 37
End: 2017-09-01

## 2017-09-01 VITALS
WEIGHT: 237.4 LBS | DIASTOLIC BLOOD PRESSURE: 93 MMHG | OXYGEN SATURATION: 99 % | SYSTOLIC BLOOD PRESSURE: 137 MMHG | TEMPERATURE: 97.8 F | RESPIRATION RATE: 18 BRPM | HEART RATE: 69 BPM | HEIGHT: 73 IN | BODY MASS INDEX: 31.46 KG/M2

## 2017-09-01 DIAGNOSIS — F51.01 PRIMARY INSOMNIA: Primary | ICD-10-CM

## 2017-09-01 DIAGNOSIS — M54.50 ACUTE MIDLINE LOW BACK PAIN WITHOUT SCIATICA: ICD-10-CM

## 2017-09-01 DIAGNOSIS — R07.89 OTHER CHEST PAIN: ICD-10-CM

## 2017-09-01 DIAGNOSIS — F41.9 ANXIETY: ICD-10-CM

## 2017-09-01 DIAGNOSIS — G89.4 CHRONIC PAIN SYNDROME: ICD-10-CM

## 2017-09-01 PROBLEM — R07.9 CHEST PAIN: Status: RESOLVED | Noted: 2017-08-31 | Resolved: 2017-09-01

## 2017-09-01 LAB
ATRIAL RATE: 60 BPM
CALCULATED P AXIS, ECG09: 8 DEGREES
CALCULATED R AXIS, ECG10: -7 DEGREES
CALCULATED T AXIS, ECG11: -2 DEGREES
DIAGNOSIS, 93000: NORMAL
P-R INTERVAL, ECG05: 176 MS
Q-T INTERVAL, ECG07: 410 MS
QRS DURATION, ECG06: 90 MS
QTC CALCULATION (BEZET), ECG08: 410 MS
VENTRICULAR RATE, ECG03: 60 BPM

## 2017-09-01 RX ORDER — TRAMADOL HYDROCHLORIDE 50 MG/1
50 TABLET ORAL
Qty: 90 TAB | Refills: 1 | Status: SHIPPED | OUTPATIENT
Start: 2017-09-01 | End: 2017-11-02 | Stop reason: SDUPTHER

## 2017-09-01 RX ORDER — TRAZODONE HYDROCHLORIDE 100 MG/1
100 TABLET ORAL
Qty: 30 TAB | Refills: 5 | Status: SHIPPED | OUTPATIENT
Start: 2017-09-01

## 2017-09-01 RX ORDER — LORAZEPAM 0.5 MG/1
0.5 TABLET ORAL
Qty: 30 TAB | Refills: 0 | Status: SHIPPED | OUTPATIENT
Start: 2017-09-01 | End: 2017-10-02 | Stop reason: SDUPTHER

## 2017-09-01 NOTE — TELEPHONE ENCOUNTER
Spoke with patient and scheduled appointment for today at 1:15pm with Dr. Rob Jack. Patient verbalized understanding.

## 2017-09-01 NOTE — TELEPHONE ENCOUNTER
----- Message from Mervat Casillas sent at 8/31/2017  4:29 PM EDT -----  Regarding: Dr. Sabas Carbajal  Pt request to see doctor tomorrow for appt for f/up from [facility] for chest pain. Best contact number to reach pt is [phone].       Copied/pasted from Michael Ross

## 2017-09-01 NOTE — PROGRESS NOTES
Chief Complaint   Patient presents with   [facility] Follow Up     Chest pain 8/31/17      1. Have you been to the ER, urgent care clinic since your last visit? Hospitalized since your last visit? Yes When: 8/31/17 Where: [facility] Reason for visit: Chest Pain     2. Have you seen or consulted any other health care providers outside of the [address] [place] [address] since your last visit? Include any pap smears or colon screening.  No

## 2017-09-01 NOTE — MR AVS SNAPSHOT
Visit Information Date & Time Provider Department Dept. Phone Encounter #  
 9/1/2017  1:15 PM Joseph Godinez [address] [place] [address],[address] [phone] [id number] Follow-up Instructions Return in about 3 months (around 12/1/2017) for anxiety, pain and chest pain . Upcoming Health Maintenance Date Due INFLUENZA AGE 9 TO ADULT 8/1/2017 DTaP/Tdap/Td series (2 - Td) 10/4/2023 Allergies as of 9/1/2017  Review Complete On: 9/1/2017 By: Kitty Gaston No Known Allergies Current Immunizations  Reviewed on 10/4/2013 Name Date Influenza Vaccine 10/4/2013 Influenza Vaccine (Quad) PF 10/27/2016, 11/25/2014 11:48 AM  
 Tdap 10/4/2013 Not reviewed this visit You Were Diagnosed With   
  
 Codes Comments Primary insomnia    -  Primary ICD-10-CM: F51.01 
ICD-9-CM: 307.42 Anxiety     ICD-10-CM: F41.9 ICD-9-CM: 300.00 Chronic pain syndrome     ICD-10-CM: G89.4 ICD-9-CM: 338.4 Acute midline low back pain without sciatica     ICD-10-CM: M54.5 ICD-9-CM: 724.2 Other chest pain     ICD-10-CM: R07.89 ICD-9-CM: 786.59 Vitals BP Pulse Temp Resp Height(growth percentile) Weight(growth percentile) (!) 137/93 (BP 1 Location: Right arm, BP Patient Position: Sitting) 69 97.8 °F (36.6 °C) (Oral) 18 6' 1\" (1.854 m) 237 lb 6.4 oz (107.7 kg) SpO2 BMI Smoking Status 99% 31.32 kg/m2 Former Smoker BMI and BSA Data Body Mass Index Body Surface Area  
 31.32 kg/m 2 2.36 m 2 Preferred Pharmacy Pharmacy Name Phone [facility]/PHARMACY [facility] - [address] [facility] AT [address] [place] [address] [phone] Your Updated Medication List  
  
   
This list is accurate as of: 9/1/17  1:57 PM.  Always use your most recent med list.  
  
  
  
  
 cholecalciferol (VITAMIN D3) 5,000 unit Tab tablet Commonly known as:  VITAMIN D3 Take 1 Tab by mouth daily. diclofenac 1 % Gel Commonly known as:  VOLTAREN Apply 4 g to affected area four (4) times daily. gabapentin 300 mg capsule Commonly known as:  NEURONTIN Take 3 Caps by mouth two (2) times a day. LORazepam 0.5 mg tablet Commonly known as:  ATIVAN Take 1 Tab by mouth daily as needed for Anxiety. methocarbamol 750 mg tablet Commonly known as:  KNXIRTO-906 Take 1 Tab by mouth four (4) times daily as needed. Indications: MUSCLE SPASM  
  
 traMADol 50 mg tablet Commonly known as:  ULTRAM  
Take 1 Tab by mouth every eight (8) hours as needed for Pain. Max Daily Amount: 150 mg.  
  
 traZODone 100 mg tablet Commonly known as:  Lottie Juliano Take 1 Tab by mouth nightly. Prescriptions Printed Refills LORazepam (ATIVAN) 0.5 mg tablet 0 Sig: Take 1 Tab by mouth daily as needed for Anxiety. Class: Print Route: Oral  
 traMADol (ULTRAM) 50 mg tablet 1 Sig: Take 1 Tab by mouth every eight (8) hours as needed for Pain. Max Daily Amount: 150 mg.  
 Class: Print Route: Oral  
  
Prescriptions Sent to Pharmacy Refills  
 traZODone (DESYREL) 100 mg tablet 5 Sig: Take 1 Tab by mouth nightly. Class: Normal  
 Pharmacy: [facility]/pharmacy [address] [place] [address] AT [address] [place] [address] #: [phone] Route: Oral  
  
We Performed the Following CARDIAC STRESS TST,COMPLETE [36535 CPT(R)] Follow-up Instructions Return in about 3 months (around 12/1/2017) for anxiety, pain and chest pain . Referral Information Referral ID Referred By Referred To  
  
 [id number] GARRETT Phillips Not Available Visits Status Start Date End Date 1 New Request 9/1/17 9/1/18 If your referral has a status of pending review or denied, additional information will be sent to support the outcome of this decision. Patient Instructions Take the medication for sleep ( trazodone) Only take the ativan for severe anxiety avoid using it daily Schedule stress test for your heart Introducing [facility]! Dear [place] Luna: Thank you for requesting a [employer] account. Our records indicate that you already have an active [employer] account. You can access your account anytime at [email or web address] Did you know that you can access your hospital and ER discharge instructions at any time in [employer]? You can also review all of your test results from your hospital stay or ER visit. Additional Information If you have questions, please visit the Frequently Asked Questions section of the [employer] website at [email or web address] Remember, [employer] is NOT to be used for urgent needs. For medical emergencies, dial 911. Now available from your iPhone and Android! Please provide this summary of care documentation to your next provider. Your primary care clinician is listed as ERNIE Wing. If you have any questions after today's visit, please call [phone].

## 2017-09-01 NOTE — PATIENT INSTRUCTIONS
Take the medication for sleep ( trazodone)   Only take the ativan for severe anxiety avoid using it daily  Schedule stress test for your heart

## 2017-09-01 NOTE — TELEPHONE ENCOUNTER
Pt returned the phone call to the nurse and would like a call back. Pt can be reached at [phone].        Message received & copied from [facility]

## 2017-09-01 NOTE — PROGRESS NOTES
CC: Hospital Follow Up (Chest pain 17 )      HPI:    He is a 42 y.o. male who presents for evaluation of increased anxiety, chest pain and chronic back pain and insomnia    Under a lot of stress mother recently  and spent 3 weeks in the hospital and Mr Jessie Rosales has not been sleeping well since mother passed away. Previously on trazodone worked in the past to help him rest  Also lost his father 4 months ago  Saw a therapist last week . Having tremendous amount of anxiety and having meltdowns. Had chest pain yesterday patient went to the emergency room and had a normal EKG negative troponin. His father had an MI in his 45s. Mr. Jessie Rosales has a history of elevated LDL 140s. No diabetes. Back pain: had back surgery in . Several visits to the ER for back pain. Back pain was improved for 10 years and then in the past 2 years pain has worsened. Patient had MRI done Dec 2016 showing postsurgical changes. Patient goes to [facility] for back injections-which provide relief( cortisone shots) and told possibly nerve compression related to hardware? Last shot in may 2017. Taking 600mg BID of gabapentin. Does not feel sleepy from medication. Last visit we started on tramadol for pain and taking it 3 times per day  Patient had an ER visit for back pain and prescribed norco in the ER - patient messaged me about this.       History of alcoholism:  2 years sobber     Works installing GPS     ROS:  10 systems reviewed and negative other than HPI    Past Medical History:   Diagnosis Date    Alcoholism ([address]     in remission    Back pain     Chronic pain     back    Concussion     H/O spinal fusion     15 years ago    Hypertension     Insomnia        Current Outpatient Prescriptions on File Prior to Visit   Medication Sig Dispense Refill    gabapentin (NEURONTIN) 300 mg capsule Take 3 Caps by mouth two (2) times a day.  180 Cap 3    diclofenac (VOLTAREN) 1 % gel Apply 4 g to affected area four (4) times daily. 1 Each 5    methocarbamol (ROBAXIN-750) 750 mg tablet Take 1 Tab by mouth four (4) times daily as needed. Indications: MUSCLE SPASM 16 Tab 0    cholecalciferol, VITAMIN D3, (VITAMIN D3) 5,000 unit tab tablet Take 1 Tab by mouth daily. 90 Tab 3     Current Facility-Administered Medications on File Prior to Visit   Medication Dose Route Frequency Provider Last Rate Last Dose    [DISCONTINUED] morphine injection 4 mg  4 mg IntraVENous NOW MD Zahraa Colon [DISCONTINUED] ketorolac (TORADOL) injection 15 mg  15 mg IntraVENous Vickie Barber MD           Past Surgical History:   Procedure Laterality Date    HX ORTHOPAEDIC      Spinal fusion L5 S1 in 2003       Family History   Problem Relation Age of Onset    Diabetes Mother     Hypertension Mother     Thyroid Disease Mother      hypothyroidism    Heart Attack Mother      age 64-smoker    Zahraa Walker Arthritis-osteo Mother     Asthma Mother     Heart Disease Mother     Elevated Lipids Father     Heart Disease Father     Hypertension Father     Diabetes Father     Cancer Father      prostate    Heart Attack Father      in 52's    Alcohol abuse Father     Arthritis-osteo Father     Psychiatric Disorder Father      Reviewed and no changes     Social History     Social History    Marital status:      Spouse name: N/A    Number of children: N/A    Years of education: N/A     Occupational History    Not on file.      Social History Main Topics    Smoking status: Former Smoker     Packs/day: 1.00     Years: 10.00     Types: Cigarettes     Quit date: 4/8/2014    Smokeless tobacco: Never Used    Alcohol use No    Drug use: No    Sexual activity: Yes     Partners: Female     Birth control/ protection: None     Other Topics Concern    Not on file     Social History Narrative            Visit Vitals    BP (!) 137/93 (BP 1 Location: Right arm, BP Patient Position: Sitting)    Pulse 69    Temp 97.8 °F (36.6 °C) (Oral)    Resp 18    Ht 6' 1\" (1.854 m)    Wt 237 lb 6.4 oz (107.7 kg)    SpO2 99%    BMI 31.32 kg/m2       Physical Examination:   General - Well appearing male, anxious and tearful  HEENT - PERRL, TM no erythema/opacification, normal nasal turbinates, oropharynx no erythema or exudate, MMM  Neck - supple, no bruits, no TMG, no LAD  Pulm - clear to auscultation bilaterally  Cardio - RRR, normal S1 S2, no murmur gallops or rubs  Abd - soft, nontender, no masses, no HSM  Extrem - no edema, +2 distal pulses  Psych - normal affect, appropriate mood    Lab Results   Component Value Date/Time    WBC 5.7 08/31/2017 01:07 PM    HGB 13.9 08/31/2017 01:07 PM    HCT 40.5 08/31/2017 01:07 PM    PLATELET 162 53/89/9493 01:07 PM    MCV 88.2 08/31/2017 01:07 PM     Lab Results   Component Value Date/Time    Sodium 139 08/31/2017 01:07 PM    Potassium 3.8 08/31/2017 01:07 PM    Chloride 106 08/31/2017 01:07 PM    CO2 27 08/31/2017 01:07 PM    Anion gap 6 08/31/2017 01:07 PM    Glucose 81 08/31/2017 01:07 PM    BUN 19 08/31/2017 01:07 PM    Creatinine 0.73 08/31/2017 01:07 PM    BUN/Creatinine ratio 26 08/31/2017 01:07 PM    GFR est AA >60 08/31/2017 01:07 PM    GFR est non-AA >60 08/31/2017 01:07 PM    Calcium 8.5 08/31/2017 01:07 PM     Lab Results   Component Value Date/Time    Cholesterol, total 230 10/27/2016 11:56 AM    HDL Cholesterol 55 10/27/2016 11:56 AM    LDL, calculated 147 10/27/2016 11:56 AM    VLDL, calculated 28 10/27/2016 11:56 AM    Triglyceride 140 10/27/2016 11:56 AM     Lab Results   Component Value Date/Time    TSH 1.360 10/27/2016 11:56 AM     No results found for: PSA, PSA2, PSAR1, Mary Cordell, PSAR3, [id number], [id number], PSALT  Lab Results   Component Value Date/Time    Hemoglobin A1c 5.4 10/27/2016 11:56 AM     Lab Results   Component Value Date/Time    VITAMIN D, 25-HYDROXY 44.0 07/06/2017 02:44 PM       Lab Results   Component Value Date/Time    ALT (SGPT) 19 08/31/2017 01:07 PM    AST (SGOT) 13 08/31/2017 01:07 PM    Alk. phosphatase 56 08/31/2017 01:07 PM    Bilirubin, direct 0.08 04/27/2015 12:00 AM    Bilirubin, total 0.4 08/31/2017 01:07 PM           Assessment/Plan:    1. Primary insomnia  -History of insomnia which has been exacerbated by death of mother and father. Trazodone worked in the past  - traZODone (DESYREL) 100 mg tablet; Take 1 Tab by mouth nightly. Dispense: 30 Tab; Refill: 5    2. Anxiety situational related to death of both father and mother within 4 months   -Patient is seen a therapist advised to continue.   - Trazodone  -Prescribed short supply of lorazepam for when anxiety is severe/discussed this is a short-term medication will not be prescribed chronically  - LORazepam (ATIVAN) 0.5 mg tablet; Take 1 Tab by mouth daily as needed for Anxiety. Dispense: 30 Tab; Refill: 0    3. Chronic pain syndrome/chronic back pain   -Has pain contract. Tramadol helps patient be functional at work improves quality of life  - traMADol (ULTRAM) 50 mg tablet; Take 1 Tab by mouth every eight (8) hours as needed for Pain. Max Daily Amount: 150 mg. Dispense: 90 Tab; Refill: 1  -reviewed  and appropiate  - reviewed his UDS     4. chest pain  - suspect related to stress of mother and father dying. Has family hx of CAD and mildly elevated LDL. Negative EKG for ischemia and neg troponin   - CARDIAC STRESS TST,COMPLETE        Follow-up Disposition:  Return in about 3 months (around 12/1/2017) for anxiety, pain and chest pain .     Sharon Cary MD

## 2017-09-19 ENCOUNTER — TELEPHONE (OUTPATIENT)
Dept: INTERNAL MEDICINE CLINIC | Age: 37
End: 2017-09-19

## 2017-09-19 DIAGNOSIS — Z91.09 ENVIRONMENTAL ALLERGIES: Primary | ICD-10-CM

## 2017-09-20 NOTE — TELEPHONE ENCOUNTER
Called patient. Two patient identifiers verified. Patient states he is allergic to cats. He feels like he is developing other allergies to environmental.   Feels itchy all over after being outside, watery eyes, sinus congestion. He is asking for a referral to allergist.   Please advise.

## 2017-10-02 DIAGNOSIS — F41.9 ANXIETY: ICD-10-CM

## 2017-10-02 RX ORDER — LORAZEPAM 0.5 MG/1
0.5 TABLET ORAL
Qty: 30 TAB | Refills: 0 | Status: SHIPPED | OUTPATIENT
Start: 2017-10-02 | End: 2017-11-14 | Stop reason: SDUPTHER

## 2017-10-02 NOTE — TELEPHONE ENCOUNTER
Called Ativan 0.5 mg tab to   [id number]- [place], [place] - [address]      [address] [place] [address] [address] [address]

## 2017-10-02 NOTE — TELEPHONE ENCOUNTER
Requested Prescriptions     Pending Prescriptions Disp Refills    LORazepam (ATIVAN) 0.5 mg tablet 30 Tab 0     Sig: Take 1 Tab by mouth daily as needed for Anxiety.      Last Refill:9/1/17    Last Office Visit: 9/1/17    Upcoming Appointment: n/s

## 2017-10-05 ENCOUNTER — TELEPHONE (OUTPATIENT)
Dept: INTERNAL MEDICINE CLINIC | Age: 37
End: 2017-10-05

## 2017-10-05 NOTE — TELEPHONE ENCOUNTER
#[phone] Neva Homans, counselor to pt states that pt scored high on testing for major depression. He would like to speak with Dr. Brittney Chu about pt trying new medications. Please call.

## 2017-10-06 ENCOUNTER — OFFICE VISIT (OUTPATIENT)
Dept: INTERNAL MEDICINE CLINIC | Age: 37
End: 2017-10-06

## 2017-10-06 VITALS
WEIGHT: 243.1 LBS | RESPIRATION RATE: 18 BRPM | HEART RATE: 76 BPM | TEMPERATURE: 97.9 F | BODY MASS INDEX: 32.22 KG/M2 | SYSTOLIC BLOOD PRESSURE: 137 MMHG | HEIGHT: 73 IN | OXYGEN SATURATION: 99 % | DIASTOLIC BLOOD PRESSURE: 88 MMHG

## 2017-10-06 DIAGNOSIS — F32.A DEPRESSION, UNSPECIFIED DEPRESSION TYPE: Primary | ICD-10-CM

## 2017-10-06 DIAGNOSIS — Z23 ENCOUNTER FOR IMMUNIZATION: ICD-10-CM

## 2017-10-06 DIAGNOSIS — F30.9 MANIA (HCC): ICD-10-CM

## 2017-10-06 RX ORDER — QUETIAPINE FUMARATE 100 MG/1
100 TABLET, FILM COATED ORAL 2 TIMES DAILY
Qty: 60 TAB | Refills: 0 | Status: SHIPPED | OUTPATIENT
Start: 2017-10-06 | End: 2017-10-11 | Stop reason: DRUGHIGH
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
## 2017-10-06 NOTE — PROGRESS NOTES
Chief Complaint   Patient presents with    Depression     1. Have you been to the ER, urgent care clinic since your last visit? Hospitalized since your last visit? No    2. Have you seen or consulted any other health care providers outside of the [facility] since your last visit? Include any pap smears or colon screening.  No

## 2017-10-06 NOTE — PATIENT INSTRUCTIONS
Depression Treatment: Care Instructions  Your Care Instructions  Depression is a condition that affects the way you feel, think, and act. It causes symptoms such as low energy, loss of interest in daily activities, and sadness or grouchiness that goes on for a long time. Depression is very common and affects men and women of all ages. Depression is a medical illness caused by changes in the natural chemicals in your brain. It is not a character flaw, and it does not mean that you are a bad or weak person. It does not mean that you are going crazy. It is important to know that depression can be treated. Medicines, counseling, and self-care can all help. Many people do not get help because they are embarrassed or think that they will get over the depression on their own. But some people do not get better without treatment. Follow-up care is a key part of your treatment and safety. Be sure to make and go to all appointments, and call your doctor if you are having problems. It's also a good idea to know your test results and keep a list of the medicines you take. How can you care for yourself at home? Learn about antidepressant medicines  Antidepressant medicines can improve or end the symptoms of depression. You may need to take the medicine for at least 6 months, and often longer. Keep taking your medicine even if you feel better. If you stop taking it too soon, your symptoms may come back or get worse. You may start to feel better within 1 to 3 weeks of taking antidepressant medicine. But it can take as many as 6 to 8 weeks to see more improvement. Talk to your doctor if you have problems with your medicine or if you do not notice any improvement after 3 weeks. Antidepressants can make you feel tired, dizzy, or nervous. Some people have dry mouth, constipation, headaches, sexual problems, an upset stomach, or diarrhea.  Many of these side effects are mild and go away on their own after you take the medicine for a few weeks. Some may last longer. Talk to your doctor if side effects bother you too much. You might be able to try a different medicine. If you are pregnant or breastfeeding, talk to your doctor about what medicines you can take. Learn about counseling  In many cases, counseling can work as well as medicines to treat mild to moderate depression. Counseling is done by licensed mental health providers, such as psychologists, social workers, and some types of nurses. It can be done in one-on-one sessions or in a group setting. Many people find group sessions helpful. Cognitive-behavioral therapy is a type of counseling. In this treatment therapy, you learn how to see and change unhelpful thinking styles that may be adding to your depression. Counseling and medicines often work well when used together. To manage depression  · Be physically active. Getting 30 minutes of exercise each day is good for your body and your mind. Begin slowly if it is hard for you to get started. If you already exercise, keep it up. · Plan something pleasant for yourself every day. Include activities that you have enjoyed in the past.  · Get enough sleep. Talk to your doctor if you have problems sleeping. · Eat a balanced diet. If you do not feel hungry, eat small snacks rather than large meals. · Do not drink alcohol, use illegal drugs, or take medicines that your doctor has not prescribed for you. They may interfere with your treatment. · Spend time with family and friends. It may help to speak openly about your depression with people you trust.  · Take your medicines exactly as prescribed. Call your doctor if you think you are having a problem with your medicine. · Do not make major life decisions while you are depressed. Depression may change the way you think. You will be able to make better decisions after you feel better. · Think positively.  Challenge negative thoughts with statements such as \"I am hopeful\"; \"Things will get better\"; and \"I can ask for the help I need. \" Write down these statements and read them often, even if you don't believe them yet. · Be patient with yourself. It took time for your depression to develop, and it will take time for your symptoms to improve. Do not take on too much or be too hard on yourself. · Learn all you can about depression from written and online materials. · Check out behavioral health classes to learn more about dealing with depression. · Keep the numbers for these national suicide hotlines: 3-080-158-TALK (8-586.640.7703) and 1-307-GAGPHOH (8-698.743.3249). If you or someone you know talks about suicide or feeling hopeless, get help right away. When should you call for help? Call 911 anytime you think you may need emergency care. For example, call if:  · You feel you cannot stop from hurting yourself or someone else. Call your doctor now or seek immediate medical care if:  · You hear voices. · You feel much more depressed. Watch closely for changes in your health, and be sure to contact your doctor if:  · You are having problems with your depression medicine. · You are not getting better as expected. Where can you learn more? Go to http://dell-sherie.info/. Enter J266 in the search box to learn more about \"Depression Treatment: Care Instructions. \"  Current as of: July 26, 2016  Content Version: 11.3  © 5561-3795 [employer]. Care instructions adapted under license by "[employer] (which disclaims liability or warranty for this information). If you have questions about a medical condition or this instruction, always ask your healthcare professional. [address] any warranty or liability for your use of this information.

## 2017-10-06 NOTE — MR AVS SNAPSHOT
Visit Information Date & Time Provider Department Dept. Phone Encounter #  
 10/6/2017  1:00 PM Gretchen, Joseph [address] [place] [address],[address] [phone] [id number] Follow-up Instructions Return in about 4 weeks (around 11/3/2017) for depression and anxiety. Follow-up and Disposition History Upcoming Health Maintenance Date Due INFLUENZA AGE 9 TO ADULT 8/1/2017 DTaP/Tdap/Td series (2 - Td) 10/4/2023 Allergies as of 10/6/2017  Review Complete On: 10/6/2017 By: MD Gretchen  
 No Known Allergies Current Immunizations  Reviewed on 10/4/2013 Name Date Influenza Vaccine 10/4/2013 Influenza Vaccine (Quad) PF  Incomplete, 10/27/2016, 11/25/2014 11:48 AM  
 Tdap 10/4/2013 Not reviewed this visit You Were Diagnosed With   
  
 Codes Comments Depression, unspecified depression type    -  Primary ICD-10-CM: F32.9 ICD-9-CM: [facility]     ICD-10-CM: F30.9 ICD-9-CM: 296.00 Encounter for immunization     ICD-10-CM: E30 ICD-9-CM: V03.89 Vitals BP Pulse Temp Resp Height(growth percentile) Weight(growth percentile) 137/88 (BP 1 Location: Right arm, BP Patient Position: Sitting) 76 97.9 °F (36.6 °C) (Oral) 18 6' 1\" (1.854 m) 243 lb 1.6 oz (110.3 kg) SpO2 BMI Smoking Status 99% 32.07 kg/m2 Former Smoker BMI and BSA Data Body Mass Index Body Surface Area 32.07 kg/m 2 2.38 m 2 Preferred Pharmacy Pharmacy Name Phone [facility]/PHARMACY #[id number] AT [address] [place] [address] [phone] Your Updated Medication List  
  
   
This list is accurate as of: 10/6/17  1:31 PM.  Always use your most recent med list.  
  
  
  
  
 cholecalciferol (VITAMIN D3) 5,000 unit Tab tablet Commonly known as:  VITAMIN D3 Take 1 Tab by mouth daily. diclofenac 1 % Gel Commonly known as:  VOLTAREN Apply 4 g to affected area four (4) times daily. gabapentin 300 mg capsule Commonly known as:  NEURONTIN Take 3 Caps by mouth two (2) times a day. LORazepam 0.5 mg tablet Commonly known as:  ATIVAN Take 1 Tab by mouth daily as needed for Anxiety. methocarbamol 750 mg tablet Commonly known as:  JRFTGAM-232 Take 1 Tab by mouth four (4) times daily as needed. Indications: MUSCLE SPASM QUEtiapine 100 mg tablet Commonly known as:  SEROquel Take 1 Tab by mouth two (2) times a day. traMADol 50 mg tablet Commonly known as:  ULTRAM  
Take 1 Tab by mouth every eight (8) hours as needed for Pain. Max Daily Amount: 150 mg.  
  
 traZODone 100 mg tablet Commonly known as:  Madan [place] Take 1 Tab by mouth nightly. Prescriptions Sent to Pharmacy Refills QUEtiapine (SEROQUEL) 100 mg tablet 0 Sig: Take 1 Tab by mouth two (2) times a day. Class: Normal  
 Pharmacy: [facility]/pharmacy [address] [place] [address] AT [address] [place] [address] #: [phone] Route: Oral  
  
We Performed the Following INFLUENZA VIRUS VAC QUAD,SPLIT,PRESV FREE SYRINGE IM [id number] CPT(R)] NV IMMUNIZ ADMIN,1 SINGLE/COMB VAC/TOXOID [id number] CPT(R)] Follow-up Instructions Return in about 4 weeks (around 11/3/2017) for depression and anxiety. Patient Instructions Depression Treatment: Care Instructions Your Care Instructions Depression is a condition that affects the way you feel, think, and act. It causes symptoms such as low energy, loss of interest in daily activities, and sadness or grouchiness that goes on for a long time. Depression is very common and affects men and women of all ages. Depression is a medical illness caused by changes in the natural chemicals in your brain. It is not a character flaw, and it does not mean that you are a bad or weak person. It does not mean that you are going crazy. It is important to know that depression can be treated.  Medicines, counseling, and self-care can all help. Many people do not get help because they are embarrassed or think that they will get over the depression on their own. But some people do not get better without treatment. Follow-up care is a key part of your treatment and safety. Be sure to make and go to all appointments, and call your doctor if you are having problems. It's also a good idea to know your test results and keep a list of the medicines you take. How can you care for yourself at home? Learn about antidepressant medicines Antidepressant medicines can improve or end the symptoms of depression. You may need to take the medicine for at least 6 months, and often longer. Keep taking your medicine even if you feel better. If you stop taking it too soon, your symptoms may come back or get worse. You may start to feel better within 1 to 3 weeks of taking antidepressant medicine. But it can take as many as 6 to 8 weeks to see more improvement. Talk to your doctor if you have problems with your medicine or if you do not notice any improvement after 3 weeks. Antidepressants can make you feel tired, dizzy, or nervous. Some people have dry mouth, constipation, headaches, sexual problems, an upset stomach, or diarrhea. Many of these side effects are mild and go away on their own after you take the medicine for a few weeks. Some may last longer. Talk to your doctor if side effects bother you too much. You might be able to try a different medicine. If you are pregnant or breastfeeding, talk to your doctor about what medicines you can take. Learn about counseling In many cases, counseling can work as well as medicines to treat mild to moderate depression. Counseling is done by licensed mental health providers, such as psychologists, social workers, and some types of nurses. It can be done in one-on-one sessions or in a group setting. Many people find group sessions helpful. Cognitive-behavioral therapy is a type of counseling. In this treatment therapy, you learn how to see and change unhelpful thinking styles that may be adding to your depression. Counseling and medicines often work well when used together. To manage depression · Be physically active. Getting 30 minutes of exercise each day is good for your body and your mind. Begin slowly if it is hard for you to get started. If you already exercise, keep it up. · Plan something pleasant for yourself every day. Include activities that you have enjoyed in the past. 
· Get enough sleep. Talk to your doctor if you have problems sleeping. · Eat a balanced diet. If you do not feel hungry, eat small snacks rather than large meals. · Do not drink alcohol, use illegal drugs, or take medicines that your doctor has not prescribed for you. They may interfere with your treatment. · Spend time with family and friends. It may help to speak openly about your depression with people you trust. 
· Take your medicines exactly as prescribed. Call your doctor if you think you are having a problem with your medicine. · Do not make major life decisions while you are depressed. Depression may change the way you think. You will be able to make better decisions after you feel better. · Think positively. Challenge negative thoughts with statements such as \"I am hopeful\"; \"Things will get better\"; and \"I can ask for the help I need. \" Write down these statements and read them often, even if you don't believe them yet. · Be patient with yourself. It took time for your depression to develop, and it will take time for your symptoms to improve. Do not take on too much or be too hard on yourself. · Learn all you can about depression from written and online materials. · Check out behavioral health classes to learn more about dealing with depression.  
· Keep the numbers for these national suicide hotlines: 8-929-000-TALK (6-038-510-250-584-5005) and 0-960-LQKTXNV (4-959.188.8844). If you or someone you know talks about suicide or feeling hopeless, get help right away. When should you call for help? Call 911 anytime you think you may need emergency care. For example, call if: 
· You feel you cannot stop from hurting yourself or someone else. Call your doctor now or seek immediate medical care if: 
· You hear voices. · You feel much more depressed. Watch closely for changes in your health, and be sure to contact your doctor if: 
· You are having problems with your depression medicine. · You are not getting better as expected. Where can you learn more? Go to http://dell-sherie.info/. Enter G065 in the search box to learn more about \"Depression Treatment: Care Instructions. \" Current as of: July 26, 2016 Content Version: 11.3 © 8509-1529 [employer]. Care instructions adapted under license by [employer] (which disclaims liability or warranty for this information). If you have questions about a medical condition or this instruction, always ask your healthcare professional. [place] [address] any warranty or liability for your use of this information. Patient Instructions History Introducing \[facility] [place] [facility]\"" & HEALTH SERVICES! Dear Giovanni Solomon: Thank you for requesting a [employer] account. Our records indicate that you already have an active [employer] account. You can access your account anytime at [email or web address]. [employer]/Front App Did you know that you can access your hospital and ER discharge instructions at any time in [employer]-cubism? You can also review all of your test results from your hospital stay or ER visit. Additional Information If you have questions, please visit the Frequently Asked Questions section of the [employer] website at [email or web address] [employer]/Front App/. Remember, [employer]-c[employer]ism is NOT to be used for urgent needs. For medical emergencies, dial 911. Now available from your iPhone and Android! Please provide this summary of care documentation to your next provider. Your primary care clinician is listed as ERNIE Wing. If you have any questions after today's visit, please call [phone].

## 2017-10-09 ENCOUNTER — PATIENT MESSAGE (OUTPATIENT)
Dept: INTERNAL MEDICINE CLINIC | Age: 37
End: 2017-10-09

## 2017-10-11 RX ORDER — QUETIAPINE FUMARATE 25 MG/1
25 TABLET, FILM COATED ORAL 2 TIMES DAILY
Qty: 60 TAB | Refills: 4 | Status: SHIPPED | OUTPATIENT
Start: 2017-10-11 | End: 2017-11-06 | Stop reason: SDUPTHER

## 2017-10-25 ENCOUNTER — PATIENT MESSAGE (OUTPATIENT)
Dept: INTERNAL MEDICINE CLINIC | Age: 37
End: 2017-10-25

## 2017-10-25 DIAGNOSIS — M54.41 CHRONIC MIDLINE LOW BACK PAIN WITH BILATERAL SCIATICA: ICD-10-CM

## 2017-10-25 DIAGNOSIS — G89.29 CHRONIC MIDLINE LOW BACK PAIN WITH BILATERAL SCIATICA: ICD-10-CM

## 2017-10-25 DIAGNOSIS — M54.42 CHRONIC MIDLINE LOW BACK PAIN WITH BILATERAL SCIATICA: ICD-10-CM

## 2017-10-25 RX ORDER — GABAPENTIN 300 MG/1
CAPSULE ORAL
Qty: 180 CAP | Refills: 3 | Status: SHIPPED | OUTPATIENT
Start: 2017-10-25

## 2017-10-25 RX ORDER — METHOCARBAMOL 750 MG/1
750 TABLET, FILM COATED ORAL
Qty: 16 TAB | Refills: 0 | Status: SHIPPED | OUTPATIENT
Start: 2017-10-25 | End: 2017-11-07

## 2017-10-25 NOTE — TELEPHONE ENCOUNTER
From: Jennifer Graham. To: Colleen Soto MD  Sent: 10/25/2017 9:49 AM EDT  Subject: Prescription Question    Was wondering if I can get a refill on the robaxin. It does help calm my back down and the work I have been doing lately has been really hurting my back. Feeling like I might end up in the er for it again today.

## 2017-11-03 DIAGNOSIS — G89.4 CHRONIC PAIN SYNDROME: ICD-10-CM

## 2017-11-03 DIAGNOSIS — M54.50 ACUTE MIDLINE LOW BACK PAIN WITHOUT SCIATICA: ICD-10-CM

## 2017-11-03 RX ORDER — TRAMADOL HYDROCHLORIDE 50 MG/1
TABLET ORAL
Qty: 90 TAB | Refills: 1 | Status: SHIPPED | OUTPATIENT
Start: 2017-11-03 | End: 2017-12-05 | Stop reason: SDUPTHER

## 2017-11-06 RX ORDER — QUETIAPINE FUMARATE 25 MG/1
25 TABLET, FILM COATED ORAL 2 TIMES DAILY
Qty: 180 TAB | Refills: 4 | Status: SHIPPED | OUTPATIENT
Start: 2017-11-06

## 2017-11-06 NOTE — TELEPHONE ENCOUNTER
Requested Prescriptions     Pending Prescriptions Disp Refills    QUEtiapine (SEROQUEL) 25 mg tablet 180 Tab 4     Sig: Take 1 Tab by mouth two (2) times a day.      Last Refill:10/11/17    Last Office Visit: 10/6/17    Upcoming Appointment: 11/6/17

## 2017-11-07 ENCOUNTER — OFFICE VISIT (OUTPATIENT)
Dept: INTERNAL MEDICINE CLINIC | Age: 37
End: 2017-11-07

## 2017-11-07 VITALS
HEART RATE: 77 BPM | TEMPERATURE: 97.9 F | DIASTOLIC BLOOD PRESSURE: 100 MMHG | OXYGEN SATURATION: 99 % | SYSTOLIC BLOOD PRESSURE: 164 MMHG | RESPIRATION RATE: 18 BRPM | BODY MASS INDEX: 32.25 KG/M2 | HEIGHT: 73 IN | WEIGHT: 243.3 LBS

## 2017-11-07 DIAGNOSIS — M54.41 ACUTE LOW BACK PAIN WITH RIGHT-SIDED SCIATICA, UNSPECIFIED BACK PAIN LATERALITY: Primary | ICD-10-CM

## 2017-11-07 RX ORDER — METHOCARBAMOL 500 MG/1
TABLET, FILM COATED ORAL
COMMUNITY
Start: 2017-10-31 | End: 2017-11-07 | Stop reason: ALTCHOICE

## 2017-11-07 RX ORDER — METHYLPREDNISOLONE 4 MG/1
TABLET ORAL
Qty: 1 DOSE PACK | Refills: 0 | Status: SHIPPED | OUTPATIENT
Start: 2017-11-07 | End: 2017-12-05 | Stop reason: ALTCHOICE

## 2017-11-07 RX ORDER — OXYCODONE HYDROCHLORIDE 5 MG/1
CAPSULE ORAL
Refills: 0 | COMMUNITY
Start: 2017-11-04 | End: 2017-12-05 | Stop reason: ALTCHOICE

## 2017-11-07 RX ORDER — CYCLOBENZAPRINE HCL 10 MG
TABLET ORAL
Refills: 0 | COMMUNITY
Start: 2017-11-04 | End: 2017-11-07 | Stop reason: SDUPTHER

## 2017-11-07 RX ORDER — METHYLPREDNISOLONE 4 MG/1
TABLET ORAL
Qty: 1 DOSE PACK | Refills: 0 | Status: SHIPPED | OUTPATIENT
Start: 2017-11-07 | End: 2017-11-07 | Stop reason: ALTCHOICE

## 2017-11-07 RX ORDER — CYCLOBENZAPRINE HCL 10 MG
10 TABLET ORAL
Qty: 30 TAB | Refills: 1 | OUTPATIENT
Start: 2017-11-07 | End: 2020-01-20

## 2017-11-07 RX ORDER — QUETIAPINE FUMARATE 100 MG/1
TABLET, FILM COATED ORAL
Refills: 0 | COMMUNITY
Start: 2017-10-06 | End: 2017-11-07 | Stop reason: ALTCHOICE

## 2017-11-07 NOTE — PATIENT INSTRUCTIONS

## 2017-11-07 NOTE — PROGRESS NOTES
Chief Complaint   Patient presents with    Anxiety     follow up     1. Have you been to the ER, urgent care clinic since your last visit? Hospitalized since your last visit? Yes When: 11/4/17 Where: [facility] Reason for visit: back pain     2. Have you seen or consulted any other health care providers outside of the [address] [place] [address] since your last visit? Include any pap smears or colon screening.  No

## 2017-11-07 NOTE — PROGRESS NOTES
CC: Anxiety (follow up)  Back pain     HPI:      Back pain: pain on right side shooting down the leg, \" different then before\" patient was not able to fill tramadol prior to going to [place] . Patient went to ER and was prescribed oxycodone 10 pills. Prescribed flexeril also. No issues with bowel or bladder / does feel that right leg at times wants to give out  Flexeril helped with symptoms  Wearing back brace  ibuprofen 800mg not helpful   Pain exacerbated by movement  Oxycodone reliefs pain more than tramadol   Reviewed MRI done in November 2016    IMPRESSION:  Postoperative findings at L5-S1. No disc herniation, canal stenosis or foraminal  stenosis demonstrated. Mood is better, stopped seroquel felt like a zombie. [facility] but patient did not make appointment    ROS:  10 systems reviewed and negative other HPI     Past Medical History:   Diagnosis Date    Alcoholism ([facility]     in remission    Back pain     Chronic pain     back    Concussion     H/O spinal fusion     15 years ago    Hypertension     Insomnia        Current Outpatient Prescriptions on File Prior to Visit   Medication Sig Dispense Refill    traMADol (ULTRAM) 50 mg tablet TAKE 1 TABLET BY MOUTH EVERY 8 HOURS AS NEEDED FOR PAIN 90 Tab 1    gabapentin (NEURONTIN) 300 mg capsule TAKE 3 CAPSULES BY MOUTH TWICE A  Cap 3    LORazepam (ATIVAN) 0.5 mg tablet Take 1 Tab by mouth daily as needed for Anxiety. 30 Tab 0    traZODone (DESYREL) 100 mg tablet Take 1 Tab by mouth nightly. 30 Tab 5    diclofenac (VOLTAREN) 1 % gel Apply 4 g to affected area four (4) times daily. 1 Each 5    cholecalciferol, VITAMIN D3, (VITAMIN D3) 5,000 unit tab tablet Take 1 Tab by mouth daily. 90 Tab 3    QUEtiapine (SEROQUEL) 25 mg tablet Take 1 Tab by mouth two (2) times a day. 180 Tab 4     No current facility-administered medications on file prior to visit.         Past Surgical History: Procedure Laterality Date    HX ORTHOPAEDIC      Spinal fusion L5 S1 in 2003       Family History   Problem Relation Age of Onset    Diabetes Mother     Hypertension Mother     Thyroid Disease Mother      hypothyroidism    Heart Attack Mother      age 64-smoker    [facility] Arthritis-osteo Mother     Asthma Mother     Heart Disease Mother     Elevated Lipids Father     Heart Disease Father     Hypertension Father     Diabetes Father     Cancer Father      prostate    Heart Attack Father      in 52's    Alcohol abuse Father     Arthritis-osteo Father     Psychiatric Disorder Father      Reviewed and no changes     Social History     Social History    Marital status:      Spouse name: N/A    Number of children: N/A    Years of education: N/A     Occupational History    Not on file.      Social History Main Topics    Smoking status: Former Smoker     Packs/day: 1.00     Years: 10.00     Types: Cigarettes     Quit date: 4/8/2014    Smokeless tobacco: Never Used    Alcohol use No    Drug use: No    Sexual activity: Yes     Partners: Female     Birth control/ protection: None     Other Topics Concern    Not on file     Social History Narrative            Visit Vitals    BP (!) 164/100 (BP 1 Location: Right arm, BP Patient Position: Sitting)    Pulse 77    Temp 97.9 °F (36.6 °C) (Oral)    Resp 18    Ht 6' 1\" (1.854 m)    Wt 243 lb 4.8 oz (110.4 kg)    SpO2 99%    BMI 32.1 kg/m2       Physical Examination:   General - Appears uncorfortable, male  HEENT - PERRL, TM no erythema/opacification, normal nasal turbinates, oropharynx no erythema or exudate, MMM  Neck - supple, no bruits, no TMG, no LAD  Pulm - clear to auscultation bilaterally  Cardio - RRR, normal S1 S2, no murmur gallops or rubs  Abd - soft, nontender, no masses, no HSM  Extrem - no edema, +2 distal pulses  Left   Psych - normal affect    Lab Results   Component Value Date/Time    WBC 5.7 08/31/2017 01:07 PM    HGB 13.9 08/31/2017 01:07 PM    HCT 40.5 08/31/2017 01:07 PM    PLATELET 899 95/38/0258 01:07 PM    MCV 88.2 08/31/2017 01:07 PM     Lab Results   Component Value Date/Time    Sodium 139 08/31/2017 01:07 PM    Potassium 3.8 08/31/2017 01:07 PM    Chloride 106 08/31/2017 01:07 PM    CO2 27 08/31/2017 01:07 PM    Anion gap 6 08/31/2017 01:07 PM    Glucose 81 08/31/2017 01:07 PM    BUN 19 08/31/2017 01:07 PM    Creatinine 0.73 08/31/2017 01:07 PM    BUN/Creatinine ratio 26 08/31/2017 01:07 PM    GFR est AA >60 08/31/2017 01:07 PM    GFR est non-AA >60 08/31/2017 01:07 PM    Calcium 8.5 08/31/2017 01:07 PM     Lab Results   Component Value Date/Time    Cholesterol, total 230 10/27/2016 11:56 AM    HDL Cholesterol 55 10/27/2016 11:56 AM    LDL, calculated 147 10/27/2016 11:56 AM    VLDL, calculated 28 10/27/2016 11:56 AM    Triglyceride 140 10/27/2016 11:56 AM     Lab Results   Component Value Date/Time    TSH 1.360 10/27/2016 11:56 AM     No results found for: PSA, PSA2, PSAR1, Genaro Vi, [id number], [id number], PSALT  Lab Results   Component Value Date/Time    Hemoglobin A1c 5.4 10/27/2016 11:56 AM     Lab Results   Component Value Date/Time    VITAMIN D, 25-HYDROXY 44.0 07/06/2017 02:44 PM       Lab Results   Component Value Date/Time    ALT (SGPT) 19 08/31/2017 01:07 PM    AST (SGOT) 13 08/31/2017 01:07 PM    Alk. phosphatase 56 08/31/2017 01:07 PM    Bilirubin, direct 0.08 04/27/2015 12:00 AM    Bilirubin, total 0.4 08/31/2017 01:07 PM           Assessment/Plan:  Back pain: sciatica with shooting quality  Has hx of back surgery. MRI one year ago looked ok   - prescribed medrol pack and flexeril  - has tramadol prescription / would prefer to avoid oxycodone  - has pain contract  - tried to obtain appointment at spine center but insurance not accepted there ( can obtain same day appointment there)   - advised to make appointment at [facility]     1. Depression: seems much better.  Stopped seroquel   Elevated blood pressure due to pain    Follow-up Disposition:  Return in about 3 months (around 2/7/2018) for chronic pain .     Heather Bedolla MD

## 2017-11-07 NOTE — MR AVS SNAPSHOT
Visit Information Date & Time Provider Department Dept. Phone Encounter #  
 11/7/2017 11:30 AM GretchenJoseph [address] [place] [address],[address] [phone] [id number] Follow-up Instructions Return in about 3 months (around 2/7/2018) for chronic pain . Upcoming Health Maintenance Date Due DTaP/Tdap/Td series (2 - Td) 10/4/2023 Allergies as of 11/7/2017  Review Complete On: 10/6/2017 By: MD Gretchen  
 No Known Allergies Current Immunizations  Reviewed on 10/4/2013 Name Date Influenza Vaccine 10/4/2013 Influenza Vaccine (Quad) PF 10/6/2017, 10/27/2016, 11/25/2014 11:48 AM  
 Tdap 10/4/2013 Not reviewed this visit You Were Diagnosed With   
  
 Codes Comments Acute low back pain with right-sided sciatica, unspecified back pain laterality    -  Primary ICD-10-CM: M54.41 
ICD-9-CM: 724.2, 724.3 Vitals BP Pulse Temp Resp Height(growth percentile) Weight(growth percentile) (!) 164/100 (BP 1 Location: Right arm, BP Patient Position: Sitting) 77 97.9 °F (36.6 °C) (Oral) 18 6' 1\" (1.854 m) 243 lb 4.8 oz (110.4 kg) SpO2 BMI Smoking Status 99% 32.1 kg/m2 Former Smoker BMI and BSA Data Body Mass Index Body Surface Area  
 32.1 kg/m 2 2.38 m 2 Preferred Pharmacy Pharmacy Name Phone [facility]/PHARMACY #[id number] [address] [phone] Your Updated Medication List  
  
   
This list is accurate as of: 11/7/17 12:08 PM.  Always use your most recent med list.  
  
  
  
  
 cholecalciferol (VITAMIN D3) 5,000 unit Tab tablet Commonly known as:  VITAMIN D3 Take 1 Tab by mouth daily. cyclobenzaprine 10 mg tablet Commonly known as:  FLEXERIL Take 1 Tab by mouth nightly as needed for Muscle Spasm(s). diclofenac 1 % Gel Commonly known as:  VOLTAREN Apply 4 g to affected area four (4) times daily. gabapentin 300 mg capsule Commonly known as:  NEURONTIN  
TAKE 3 CAPSULES BY MOUTH TWICE A DAY LORazepam 0.5 mg tablet Commonly known as:  ATIVAN Take 1 Tab by mouth daily as needed for Anxiety. methylPREDNISolone 4 mg tablet Commonly known as:  Amanda Geneva Take it with food  
  
 oxyCODONE 5 mg capsule Commonly known as:  OXYIR  
TK 1 C PO Q 6 H PRN P  
  
 QUEtiapine 25 mg tablet Commonly known as:  SEROquel Take 1 Tab by mouth two (2) times a day. traMADol 50 mg tablet Commonly known as:  ULTRAM  
TAKE 1 TABLET BY MOUTH EVERY 8 HOURS AS NEEDED FOR PAIN  
  
 traZODone 100 mg tablet Commonly known as:  Ray Oriental orthodox Take 1 Tab by mouth nightly. Prescriptions Sent to Pharmacy Refills  
 cyclobenzaprine (FLEXERIL) 10 mg tablet 1 Sig: Take 1 Tab by mouth nightly as needed for Muscle Spasm(s). Class: Normal  
 Pharmacy: [facility]/pharmacy #[id number] AT [address] [place] [address] Ph #: [phone] Route: Oral  
 methylPREDNISolone (MEDROL DOSEPACK) 4 mg tablet 0 Sig: Take it with food Class: Normal  
 Pharmacy: [facility]/pharmacy #[id number] AT [address] [place] [address] Ph #: [phone] We Performed the Following REFERRAL TO PAIN MANAGEMENT [[id number] Custom] Comments:  
 Back pain Follow-up Instructions Return in about 3 months (around 2/7/2018) for chronic pain . Referral Information Referral ID Referred By Referred To  
  
 [id number] GARRETT BARBA, [address] Mary Hobson [address] Visits Status Start Date End Date 1 New Request 11/7/17 11/7/18 If your referral has a status of pending review or denied, additional information will be sent to support the outcome of this decision. Patient Instructions Back Pain: Care Instructions Your Care Instructions Back pain has many possible causes. It is often related to problems with muscles and ligaments of the back. It may also be related to problems with the nerves, discs, or bones of the back. Moving, lifting, standing, sitting, or sleeping in an awkward way can strain the back. Sometimes you don't notice the injury until later. Arthritis is another common cause of back pain. Although it may hurt a lot, back pain usually improves on its own within several weeks. Most people recover in 12 weeks or less. Using good home treatment and being careful not to stress your back can help you feel better sooner. Follow-up care is a key part of your treatment and safety. Be sure to make and go to all appointments, and call your doctor if you are having problems. It's also a good idea to know your test results and keep a list of the medicines you take. How can you care for yourself at home? · Sit or lie in positions that are most comfortable and reduce your pain. Try one of these positions when you lie down: ¨ Lie on your back with your knees bent and supported by large pillows. ¨ Lie on the floor with your legs on the seat of a sofa or chair. Tremaine Loach on your side with your knees and hips bent and a pillow between your legs. ¨ Lie on your stomach if it does not make pain worse. · Do not sit up in bed, and avoid soft couches and twisted positions. Bed rest can help relieve pain at first, but it delays healing. Avoid bed rest after the first day of back pain. · Change positions every 30 minutes. If you must sit for long periods of time, take breaks from sitting. Get up and walk around, or lie in a comfortable position. · Try using a heating pad on a low or medium setting for 15 to 20 minutes every 2 or 3 hours. Try a warm shower in place of one session with the heating pad. · You can also try an ice pack for 10 to 15 minutes every 2 to 3 hours. Put a thin cloth between the ice pack and your skin. · Take pain medicines exactly as directed. ¨ If the doctor gave you a prescription medicine for pain, take it as prescribed. ¨ If you are not taking a prescription pain medicine, ask your doctor if you can take an over-the-counter medicine. · Take short walks several times a day. You can start with 5 to 10 minutes, 3 or 4 times a day, and work up to longer walks. Walk on level surfaces and avoid hills and stairs until your back is better. · Return to work and other activities as soon as you can. Continued rest without activity is usually not good for your back. · To prevent future back pain, do exercises to stretch and strengthen your back and stomach. Learn how to use good posture, safe lifting techniques, and proper body mechanics. When should you call for help? Call your doctor now or seek immediate medical care if: 
? · You have new or worsening numbness in your legs. ? · You have new or worsening weakness in your legs. (This could make it hard to stand up.) ? · You lose control of your bladder or bowels. ? Watch closely for changes in your health, and be sure to contact your doctor if: 
? · Your pain gets worse. ? · You are not getting better after 2 weeks. Where can you learn more? Go to http://dell-sherie.info/. Enter H229 in the search box to learn more about \"Back Pain: Care Instructions. \" Current as of: March 21, 2017 Content Version: 11.4 © 4286-2908 [employer]. Care instructions adapted under license by [employer] (which disclaims liability or warranty for this information). If you have questions about a medical condition or this instruction, always ask your healthcare professional. [address] any warranty or liability for your use of this information. Introducing [facility]! Dear Fay Schultz: Thank you for requesting a [employer] account.   Our records indicate that you already have an active [employer] account. You can access your account anytime at [email or web address]. [employer] Did you know that you can access your hospital and ER discharge instructions at any time in [employer]? You can also review all of your test results from your hospital stay or ER visit. Additional Information If you have questions, please visit the Frequently Asked Questions section of the [employer] website at [email or web address]. [employer]/. Remember, [employer] is NOT to be used for urgent needs. For medical emergencies, dial 911. Now available from your iPhone and Android! Please provide this summary of care documentation to your next provider. Your primary care clinician is listed as ERNIE Wing. If you have any questions after today's visit, please call [phone].

## 2017-11-14 DIAGNOSIS — F41.9 ANXIETY: ICD-10-CM

## 2017-11-14 NOTE — TELEPHONE ENCOUNTER
#[phone]  Pt states he believes you filled the wrong script. Please call as soon as possible. Thanks.

## 2017-11-15 RX ORDER — LORAZEPAM 0.5 MG/1
0.5 TABLET ORAL
Qty: 30 TAB | Refills: 0 | OUTPATIENT
Start: 2017-11-15 | End: 2022-04-24 | Stop reason: CLARIF

## 2017-11-28 ENCOUNTER — HOSPITAL ENCOUNTER (OUTPATIENT)
Dept: MRI IMAGING | Age: 37
Discharge: HOME | End: 2017-11-28
Attending: ORTHOPAEDIC SURGERY
Payer: COMMERCIAL

## 2017-11-28 DIAGNOSIS — Z98.1 S/P SPINAL FUSION: ICD-10-CM

## 2017-11-28 DIAGNOSIS — M54.40 LOW BACK PAIN WITH SCIATICA, SCIATICA LATERALITY UNSPECIFIED, UNSPECIFIED BACK PAIN LATERALITY, UNSPECIFIED CHRONICITY: ICD-10-CM

## 2017-11-28 PROCEDURE — 72158 MRI LUMBAR SPINE W/O & W/DYE: CPT

## 2017-11-28 PROCEDURE — 74011250636 HC RX REV CODE- 250/636: Performed by: ORTHOPAEDIC SURGERY

## 2017-11-28 PROCEDURE — A9576 INJ PROHANCE MULTIPACK: HCPCS | Performed by: ORTHOPAEDIC SURGERY

## 2017-11-28 RX ADMIN — GADOTERIDOL 20 ML: 279.3 INJECTION, SOLUTION INTRAVENOUS at 09:47

## 2017-11-29 ENCOUNTER — TELEPHONE (OUTPATIENT)
Dept: INTERNAL MEDICINE CLINIC | Age: 37
End: 2017-11-29

## 2017-11-29 NOTE — TELEPHONE ENCOUNTER
Non-Urgent Medical Question        From     Jeniffer Bill.      To     [facility] Nurse Pool      Sent     11/29/2017  8:54 AM            Hey I am having a lot of weird symptoms and was wondering if I need to come in.   Monday morning I ate at a new place for breakfast.  Monday night I started feeling achy and like I had a head cold.  Yesterday morning I woke up with terrible diarrhea and figured I had food poisoning. I had my back mri yesterday and took a Valium and slept most of the day.   When I woke up yesterday afternoon I felt very nauseous and achy and all that.  Still had the diarrhea.   Ate some turkey and mashed potatoes and went back to bed.  Was up a few times with diarrhea and still have it now.   I feel terrible today.  Hot and cold and have a constant mouth watering like I am going to vomit but haven't.  I didn't think food poisoning lasted this long.    Thoughts? Patient sent another [employer] message complaining of having black stool. Message copy/pasted from [employer].

## 2017-12-05 ENCOUNTER — OFFICE VISIT (OUTPATIENT)
Dept: INTERNAL MEDICINE CLINIC | Age: 37
End: 2017-12-05

## 2017-12-05 VITALS
RESPIRATION RATE: 16 BRPM | WEIGHT: 246.2 LBS | TEMPERATURE: 97.7 F | BODY MASS INDEX: 32.63 KG/M2 | HEIGHT: 73 IN | DIASTOLIC BLOOD PRESSURE: 98 MMHG | HEART RATE: 72 BPM | OXYGEN SATURATION: 99 % | SYSTOLIC BLOOD PRESSURE: 144 MMHG

## 2017-12-05 DIAGNOSIS — R10.84 GENERALIZED ABDOMINAL PAIN: ICD-10-CM

## 2017-12-05 DIAGNOSIS — K62.5 RECTAL BLEEDING: Primary | ICD-10-CM

## 2017-12-05 DIAGNOSIS — M54.41 ACUTE LOW BACK PAIN WITH RIGHT-SIDED SCIATICA, UNSPECIFIED BACK PAIN LATERALITY: ICD-10-CM

## 2017-12-05 DIAGNOSIS — G89.4 CHRONIC PAIN SYNDROME: ICD-10-CM

## 2017-12-05 DIAGNOSIS — R19.7 DIARRHEA OF PRESUMED INFECTIOUS ORIGIN: ICD-10-CM

## 2017-12-05 DIAGNOSIS — M54.50 ACUTE MIDLINE LOW BACK PAIN WITHOUT SCIATICA: ICD-10-CM

## 2017-12-05 RX ORDER — TRAMADOL HYDROCHLORIDE 50 MG/1
50 TABLET ORAL
Qty: 120 TAB | Refills: 1 | OUTPATIENT
Start: 2017-12-05 | End: 2020-01-20

## 2017-12-05 RX ORDER — GABAPENTIN 300 MG/1
900 CAPSULE ORAL
COMMUNITY
Start: 2017-11-16 | End: 2017-12-05 | Stop reason: SDUPTHER

## 2017-12-05 RX ORDER — TRAMADOL HYDROCHLORIDE 50 MG/1
TABLET ORAL
Qty: 90 TAB | Refills: 1 | Status: CANCELLED | OUTPATIENT
Start: 2017-12-05

## 2017-12-05 RX ORDER — MELOXICAM 15 MG/1
15 TABLET ORAL
COMMUNITY
Start: 2017-11-16 | End: 2017-12-05 | Stop reason: ALTCHOICE

## 2017-12-05 NOTE — PATIENT INSTRUCTIONS
Rectal Bleeding: Care Instructions  Your Care Instructions    Rectal bleeding in small amounts is common. You may see red spotting on toilet paper or drops of blood in the toilet. Rectal bleeding has many possible causes, from something as minor as hemorrhoids to something as serious as colon cancer. You may need more tests to find the cause of your bleeding. Follow-up care is a key part of your treatment and safety. Be sure to make and go to all appointments, and call your doctor if you are having problems. It's also a good idea to know your test results and keep a list of the medicines you take. How can you care for yourself at home? · Avoid aspirin and other nonsteroidal anti-inflammatory drugs (NSAIDs), such as ibuprofen (Advil, Motrin) and naproxen (Aleve). They can cause you to bleed more. Ask your doctor if you can take acetaminophen (Tylenol). Read and follow all instructions on the label. · Use a stool softener that contains bran or psyllium. You can save money by buying bran or psyllium (available in bulk at most health food stores) and sprinkling it on foods or stirring it into fruit juice. You can also use a product such as Metamucil or Citrucel. · Take your medicines exactly as directed. Call your doctor if you think you are having a problem with your medicine. When should you call for help? Call 911 anytime you think you may need emergency care. For example, call if:  ? · You passed out (lost consciousness). ?Call your doctor now or seek immediate medical care if:  ? · You have new or worse pain. ? · You have new or worse bleeding from the rectum. ? · You are dizzy or light-headed, or you feel like you may faint. ? Watch closely for changes in your health, and be sure to contact your doctor if:  ? · You cannot pass stools or gas. ? · You do not get better as expected. Where can you learn more? Go to http://dell-sherie.info/.   Enter V593 in the search box to learn more about \"Rectal Bleeding: Care Instructions. \"  Current as of: May 12, 2017  Content Version: 11.4  © 5890-5221 Healthwise, [employer]. Care instructions adapted under license by [employer] (which disclaims liability or warranty for this information). If you have questions about a medical condition or this instruction, always ask your healthcare professional. [address] any warranty or liability for your use of this information.

## 2017-12-05 NOTE — PROGRESS NOTES
CC: Abdominal Pain; Melena; and Follow-up (seen at [facility])      HPI:    He is a 42 y.o. male who presents for evaluation of abdominal complaints    Onset 8 days ago, initially thought it food poisoning. Had terrible diarrhea  Nausea and abdominal pain ( lower abdomen) . Had to take the entire week off from work. Had intermittent fever and chills. Mobic 15mg new prescription from ortho   Nausea is better    Positive occult blood at [facility] and given stool collection kit  Patient still notes black stools, denies dizziness and abdominal pain today  No Bright red blood   generally feels unwell    Back pain is severe - saw ortho had MRI back done. We discussed results today. Patient is requesting refill of tramadol  Tramadol improves pain, muscle relaxant also helps. ROS:  10 systems reviewed and negative other than hPI     Past Medical History:   Diagnosis Date    Alcoholism ([address]     in remission    Back pain     Chronic pain     back    Concussion     H/O spinal fusion     15 years ago    Hypertension     Insomnia        Current Outpatient Prescriptions on File Prior to Visit   Medication Sig Dispense Refill    LORazepam (ATIVAN) 0.5 mg tablet Take 1 Tab by mouth daily as needed for Anxiety. 30 Tab 0    cyclobenzaprine (FLEXERIL) 10 mg tablet Take 1 Tab by mouth nightly as needed for Muscle Spasm(s). 30 Tab 1    QUEtiapine (SEROQUEL) 25 mg tablet Take 1 Tab by mouth two (2) times a day. 180 Tab 4    gabapentin (NEURONTIN) 300 mg capsule TAKE 3 CAPSULES BY MOUTH TWICE A DAY (Patient taking differently: TAKE 3 CAPSULES BY MOUTH THREE TIMES DAILY) 180 Cap 3    traZODone (DESYREL) 100 mg tablet Take 1 Tab by mouth nightly. 30 Tab 5    diclofenac (VOLTAREN) 1 % gel Apply 4 g to affected area four (4) times daily. 1 Each 5    cholecalciferol, VITAMIN D3, (VITAMIN D3) 5,000 unit tab tablet Take 1 Tab by mouth daily. 90 Tab 3     No current facility-administered medications on file prior to visit. Past Surgical History:   Procedure Laterality Date    HX ORTHOPAEDIC      Spinal fusion L5 S1 in 2003       Family History   Problem Relation Age of Onset    Diabetes Mother     Hypertension Mother     Thyroid Disease Mother      hypothyroidism    Heart Attack Mother      age 64-smoker    [facility] Arthritis-osteo Mother     Asthma Mother     Heart Disease Mother     Elevated Lipids Father     Heart Disease Father     Hypertension Father     Diabetes Father     Cancer Father      prostate    Heart Attack Father      in 52's    Alcohol abuse Father     Arthritis-osteo Father     Psychiatric Disorder Father      Reviewed and no changes     Social History     Social History    Marital status:      Spouse name: N/A    Number of children: N/A    Years of education: N/A     Occupational History    Not on file.      Social History Main Topics    Smoking status: Former Smoker     Packs/day: 1.00     Years: 10.00     Types: Cigarettes     Quit date: 4/8/2014    Smokeless tobacco: Never Used    Alcohol use No    Drug use: No    Sexual activity: Yes     Partners: Female     Birth control/ protection: None     Other Topics Concern    Not on file     Social History Narrative            Visit Vitals    BP (!) 144/98 (BP 1 Location: Left arm, BP Patient Position: Sitting)    Pulse 72    Temp 97.7 °F (36.5 °C) (Oral)    Resp 16    Ht 6' 1\" (1.854 m)    Wt 246 lb 3.2 oz (111.7 kg)    SpO2 99%    BMI 32.48 kg/m2       Physical Examination:   General - Well appearing male  HEENT - PERRL, TM no erythema/opacification, normal nasal turbinates, oropharynx no erythema or exudate, MMM  Neck - supple, no bruits, no TMG, no LAD  Pulm - clear to auscultation bilaterally  Cardio - RRR, normal S1 S2, no murmur gallops or rubs  Abd - soft, nontender, no masses, no HSM  Extrem - no edema, +2 distal pulses  Psych - normal affect, appropriate mood    Lab Results   Component Value Date/Time    WBC 5.7 08/31/2017 01:07 PM    HGB 13.9 08/31/2017 01:07 PM    HCT 40.5 08/31/2017 01:07 PM    PLATELET 630 01/39/1807 01:07 PM    MCV 88.2 08/31/2017 01:07 PM     Lab Results   Component Value Date/Time    Sodium 139 08/31/2017 01:07 PM    Potassium 3.8 08/31/2017 01:07 PM    Chloride 106 08/31/2017 01:07 PM    CO2 27 08/31/2017 01:07 PM    Anion gap 6 08/31/2017 01:07 PM    Glucose 81 08/31/2017 01:07 PM    BUN 19 08/31/2017 01:07 PM    Creatinine 0.73 08/31/2017 01:07 PM    BUN/Creatinine ratio 26 08/31/2017 01:07 PM    GFR est AA >60 08/31/2017 01:07 PM    GFR est non-AA >60 08/31/2017 01:07 PM    Calcium 8.5 08/31/2017 01:07 PM     Lab Results   Component Value Date/Time    Cholesterol, total 230 10/27/2016 11:56 AM    HDL Cholesterol 55 10/27/2016 11:56 AM    LDL, calculated 147 10/27/2016 11:56 AM    VLDL, calculated 28 10/27/2016 11:56 AM    Triglyceride 140 10/27/2016 11:56 AM     Lab Results   Component Value Date/Time    TSH 1.360 10/27/2016 11:56 AM     No results found for: PSA, PSA2, PSAR1, Linda Necessary, [id number], [id number], PSALT  Lab Results   Component Value Date/Time    Hemoglobin A1c 5.4 10/27/2016 11:56 AM     Lab Results   Component Value Date/Time    VITAMIN D, 25-HYDROXY 44.0 07/06/2017 02:44 PM       Lab Results   Component Value Date/Time    ALT (SGPT) 19 08/31/2017 01:07 PM    AST (SGOT) 13 08/31/2017 01:07 PM    Alk. phosphatase 56 08/31/2017 01:07 PM    Bilirubin, direct 0.08 04/27/2015 12:00 AM    Bilirubin, total 0.4 08/31/2017 01:07 PM           Assessment/Plan:      Chronic   back pain without sciatica  - traMADol (ULTRAM) 50 mg tablet; Take 1 Tab by mouth every six (6) hours as needed for Pain. Max Daily Amount: 200 mg. Dispense: 120 Tab; Refill: 1     Melena, abdominal pain - lower abdomen. Diarrhea   Suspect food poisoning but symptoms prolonged. Checking blood work and referral to GI.  Inflammatory bowel disease in the differential  - CBC WITH AUTOMATED DIFF  - METABOLIC PANEL, COMPREHENSIVE  - SED RATE (ESR)  - [facility]  - OVA & PARASITES, STOOL  - WBC, STOOL  - CULTURE, STOOL          Follow-up Disposition:  Return if symptoms worsen or fail to improve.     Zaid Fuentes MD

## 2017-12-05 NOTE — MR AVS SNAPSHOT
Visit Information Date & Time Provider Department Dept. Phone Encounter #  
 12/5/2017  9:15 AM Joseph Godinez [address] [place] [address],[address] [phone] [id number] Follow-up Instructions Return if symptoms worsen or fail to improve. Upcoming Health Maintenance Date Due DTaP/Tdap/Td series (2 - Td) 10/4/2023 Allergies as of 12/5/2017  Review Complete On: 12/5/2017 By: Don Quigley LPN No Known Allergies Current Immunizations  Reviewed on 10/4/2013 Name Date Influenza Vaccine 10/4/2013 Influenza Vaccine (Quad) PF 10/6/2017, 10/27/2016, 11/25/2014 11:48 AM  
 Tdap 10/4/2013 Not reviewed this visit You Were Diagnosed With   
  
 Codes Comments Rectal bleeding    -  Primary ICD-10-CM: K62.5 ICD-9-CM: 952. 3 Chronic pain syndrome     ICD-10-CM: G89.4 ICD-9-CM: 338.4 Acute midline low back pain without sciatica     ICD-10-CM: M54.5 ICD-9-CM: 724.2 Acute low back pain with right-sided sciatica, unspecified back pain laterality     ICD-10-CM: M54.41 
ICD-9-CM: 724.2, 724.3 Vitals BP Pulse Temp Resp Height(growth percentile) Weight(growth percentile) (!) 144/98 (BP 1 Location: Left arm, BP Patient Position: Sitting) 72 97.7 °F (36.5 °C) (Oral) 16 6' 1\" (1.854 m) 246 lb 3.2 oz (111.7 kg) SpO2 BMI Smoking Status 99% 32.48 kg/m2 Former Smoker Vitals History BMI and BSA Data Body Mass Index Body Surface Area  
 32.48 kg/m 2 2.4 m 2 Preferred Pharmacy Pharmacy Name Phone [facility]Y #[id number]- [facility] [address] [facility] AT [address] [place] [address] [phone] Your Updated Medication List  
  
   
This list is accurate as of: 12/5/17 10:25 AM.  Always use your most recent med list.  
  
  
  
  
 cholecalciferol (VITAMIN D3) 5,000 unit Tab tablet Commonly known as:  VITAMIN D3 Take 1 Tab by mouth daily. cyclobenzaprine 10 mg tablet Commonly known as:  FLEXERIL  
 Take 1 Tab by mouth nightly as needed for Muscle Spasm(s). diclofenac 1 % Gel Commonly known as:  VOLTAREN Apply 4 g to affected area four (4) times daily. gabapentin 300 mg capsule Commonly known as:  NEURONTIN  
TAKE 3 CAPSULES BY MOUTH TWICE A DAY LORazepam 0.5 mg tablet Commonly known as:  ATIVAN Take 1 Tab by mouth daily as needed for Anxiety. QUEtiapine 25 mg tablet Commonly known as:  SEROquel Take 1 Tab by mouth two (2) times a day. traMADol 50 mg tablet Commonly known as:  ULTRAM  
Take 1 Tab by mouth every six (6) hours as needed for Pain. Max Daily Amount: 200 mg.  
  
 traZODone 100 mg tablet Commonly known as:  Laura Au Take 1 Tab by mouth nightly. Prescriptions Printed Refills  
 traMADol (ULTRAM) 50 mg tablet 1 Sig: Take 1 Tab by mouth every six (6) hours as needed for Pain. Max Daily Amount: 200 mg. Class: Print Route: Oral  
  
We Performed the Following CBC WITH AUTOMATED DIFF [29992 CPT(R)] METABOLIC PANEL, COMPREHENSIVE [11166 CPT(R)] REFERRAL TO GASTROENTEROLOGY [EIV23 Custom] SED RATE (ESR) [id number] CPT(R)] Follow-up Instructions Return if symptoms worsen or fail to improve. Referral Information Referral ID Referred By Referred To  
  
 [id number] 1950 [facility], [address] [place] [address] [facility] 202 [place], [address] [facility] Visits Status Start Date End Date 1 New Request 12/5/17 12/5/18 If your referral has a status of pending review or denied, additional information will be sent to support the outcome of this decision. Patient Instructions Rectal Bleeding: Care Instructions Your Care Instructions Rectal bleeding in small amounts is common. You may see red spotting on toilet paper or drops of blood in the toilet.  Rectal bleeding has many possible causes, from something as minor as hemorrhoids to something as serious as colon cancer. You may need more tests to find the cause of your bleeding. Follow-up care is a key part of your treatment and safety. Be sure to make and go to all appointments, and call your doctor if you are having problems. It's also a good idea to know your test results and keep a list of the medicines you take. How can you care for yourself at home? · Avoid aspirin and other nonsteroidal anti-inflammatory drugs (NSAIDs), such as ibuprofen (Advil, Motrin) and naproxen (Aleve). They can cause you to bleed more. Ask your doctor if you can take acetaminophen (Tylenol). Read and follow all instructions on the label. · Use a stool softener that contains bran or psyllium. You can save money by buying bran or psyllium (available in bulk at most health food stores) and sprinkling it on foods or stirring it into fruit juice. You can also use a product such as Metamucil or Citrucel. · Take your medicines exactly as directed. Call your doctor if you think you are having a problem with your medicine. When should you call for help? Call 911 anytime you think you may need emergency care. For example, call if: 
? · You passed out (lost consciousness). ?Call your doctor now or seek immediate medical care if: 
? · You have new or worse pain. ? · You have new or worse bleeding from the rectum. ? · You are dizzy or light-headed, or you feel like you may faint. ? Watch closely for changes in your health, and be sure to contact your doctor if: 
? · You cannot pass stools or gas. ? · You do not get better as expected. Where can you learn more? Go to http://dell-sherie.info/. Enter I505 in the search box to learn more about \"Rectal Bleeding: Care Instructions. \" Current as of: May 12, 2017 Content Version: 11.4 © 9955-3349 [employer].  Care instructions adapted under license by [employer] (which disclaims liability or warranty for this information). If you have questions about a medical condition or this instruction, always ask your healthcare professional. [address] any warranty or liability for your use of this information. Introducing [facility] SERVICES! Dear Josie Farias: Thank you for requesting a [employer] account. Our records indicate that you already have an active [employer] account. You can access your account anytime at [email or web address] Did you know that you can access your hospital and ER discharge instructions at any time in [employer]Find? You can also review all of your test results from your hospital stay or ER visit. Additional Information If you have questions, please visit the Frequently Asked Questions section of the [employer] website at [email or web address] Remember, [employer] is NOT to be used for urgent needs. For medical emergencies, dial 911. Now available from your iPhone and Android! Please provide this summary of care documentation to your next provider. Your primary care clinician is listed as ERNIE Wing. If you have any questions after today's visit, please call [phone].

## 2017-12-06 LAB
ALBUMIN SERPL-MCNC: 4.7 G/DL (ref 3.5–5.5)
ALBUMIN/GLOB SERPL: 1.8 {RATIO} (ref 1.2–2.2)
ALP SERPL-CCNC: 68 IU/L (ref 39–117)
ALT SERPL-CCNC: 12 IU/L (ref 0–44)
AST SERPL-CCNC: 12 IU/L (ref 0–40)
BASOPHILS # BLD AUTO: 0 X10E3/UL (ref 0–0.2)
BASOPHILS NFR BLD AUTO: 0 %
BILIRUB SERPL-MCNC: 0.2 MG/DL (ref 0–1.2)
BUN SERPL-MCNC: 17 MG/DL (ref 6–20)
BUN/CREAT SERPL: 17 (ref 9–20)
CALCIUM SERPL-MCNC: 9.8 MG/DL (ref 8.7–10.2)
CHLORIDE SERPL-SCNC: 101 MMOL/L (ref 96–106)
CO2 SERPL-SCNC: 27 MMOL/L (ref 18–29)
CREAT SERPL-MCNC: 0.98 MG/DL (ref 0.76–1.27)
EOSINOPHIL # BLD AUTO: 0.2 X10E3/UL (ref 0–0.4)
EOSINOPHIL NFR BLD AUTO: 3 %
ERYTHROCYTE [DISTWIDTH] IN BLOOD BY AUTOMATED COUNT: 13 % (ref 12.3–15.4)
ERYTHROCYTE [SEDIMENTATION RATE] IN BLOOD BY WESTERGREN METHOD: 4 MM/HR (ref 0–15)
GFR SERPLBLD CREATININE-BSD FMLA CKD-EPI: 112 ML/MIN/1.73
GFR SERPLBLD CREATININE-BSD FMLA CKD-EPI: 97 ML/MIN/1.73
GLOBULIN SER CALC-MCNC: 2.6 G/DL (ref 1.5–4.5)
GLUCOSE SERPL-MCNC: 88 MG/DL (ref 65–99)
HCT VFR BLD AUTO: 43.1 % (ref 37.5–51)
HGB BLD-MCNC: 14.5 G/DL (ref 13–17.7)
IMM GRANULOCYTES # BLD: 0 X10E3/UL (ref 0–0.1)
IMM GRANULOCYTES NFR BLD: 0 %
LYMPHOCYTES # BLD AUTO: 1.9 X10E3/UL (ref 0.7–3.1)
LYMPHOCYTES NFR BLD AUTO: 28 %
MCH RBC QN AUTO: 30.3 PG (ref 26.6–33)
MCHC RBC AUTO-ENTMCNC: 33.6 G/DL (ref 31.5–35.7)
MCV RBC AUTO: 90 FL (ref 79–97)
MONOCYTES # BLD AUTO: 0.7 X10E3/UL (ref 0.1–0.9)
MONOCYTES NFR BLD AUTO: 11 %
NEUTROPHILS # BLD AUTO: 3.9 X10E3/UL (ref 1.4–7)
NEUTROPHILS NFR BLD AUTO: 58 %
PLATELET # BLD AUTO: 305 X10E3/UL (ref 150–379)
POTASSIUM SERPL-SCNC: 4.8 MMOL/L (ref 3.5–5.2)
PROT SERPL-MCNC: 7.3 G/DL (ref 6–8.5)
RBC # BLD AUTO: 4.78 X10E6/UL (ref 4.14–5.8)
SODIUM SERPL-SCNC: 145 MMOL/L (ref 134–144)
WBC # BLD AUTO: 6.7 X10E3/UL (ref 3.4–10.8)

## 2017-12-06 NOTE — PROGRESS NOTES
Kidney and liver and blood count are normal. mild elevation in sodium patient needs to increase free water intake

## 2017-12-07 NOTE — PROGRESS NOTES
Called, spoke to pt. Two pt identifiers confirmed. Pt informed per Dr. Doroteo Emerson kidney, liver, and blood count came back normal.  Pt informed per Dr. Doroteo Emerson mild sodium elevation; increase water intake. Pt verbalized understanding of information discussed w/ no further questions at this time.

## 2017-12-08 LAB — WBC STL QL MICRO: NORMAL

## 2017-12-09 LAB
CAMPYLOBACTER STL CULT: NORMAL
E COLI SXT STL QL IA: NEGATIVE
SALM + SHIG STL CULT: NORMAL

## 2018-06-04 ENCOUNTER — DOCUMENTATION ONLY (OUTPATIENT)
Dept: FAMILY MEDICINE CLINIC | Age: 38
End: 2018-06-04

## 2018-07-15 DIAGNOSIS — M25.511 ACUTE PAIN OF RIGHT SHOULDER: ICD-10-CM

## 2018-07-16 RX ORDER — DICLOFENAC SODIUM 10 MG/G
GEL TOPICAL
Qty: 100 G | Refills: 2 | Status: SHIPPED | OUTPATIENT
Start: 2018-07-16

## 2018-10-14 ENCOUNTER — HOSPITAL ENCOUNTER (EMERGENCY)
Age: 38
Discharge: LEFT BEFORE DISCHARGE | End: 2018-10-14
Attending: INTERNAL MEDICINE
Payer: SELF-PAY

## 2018-10-14 VITALS
DIASTOLIC BLOOD PRESSURE: 101 MMHG | HEART RATE: 97 BPM | BODY MASS INDEX: 29.42 KG/M2 | OXYGEN SATURATION: 100 % | WEIGHT: 223 LBS | RESPIRATION RATE: 16 BRPM | TEMPERATURE: 98.6 F | SYSTOLIC BLOOD PRESSURE: 144 MMHG

## 2018-10-14 DIAGNOSIS — F43.0 ACUTE STRESS REACTION: Primary | ICD-10-CM

## 2018-10-14 PROCEDURE — 99283 EMERGENCY DEPT VISIT LOW MDM: CPT

## 2018-10-14 PROCEDURE — 99284 EMERGENCY DEPT VISIT MOD MDM: CPT

## 2018-10-14 PROCEDURE — 90791 PSYCH DIAGNOSTIC EVALUATION: CPT
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
## 2018-10-14 NOTE — ED NOTES
Patient arrives via EMS for alcohol intoxication. Patient relays he lost both parents within 4 months of each other and hasn't been able to grieve properly. He relays he just found out his son is on a 67 hour psych hold which prompted his drinking. Had previously been clean for 3 years. He seems withdrawn and depressed. He is cooperative with clear speech. Denies SI/HI. Girlfriend called EMS and patient relays he doesn't want to stay or be seen today.

## 2018-10-14 NOTE — ED NOTES
Emergency Department Nursing Plan of Care The Nursing Plan of Care is developed from the Nursing assessment and Emergency Department Attending provider initial evaluation. The plan of care may be reviewed in the ED Provider note. The Plan of Care was developed with the following considerations:  
Patient / Family readiness to learn indicated by:verbalized understanding Persons(s) to be included in education: patient Barriers to Learning/Limitations:No 
 
Signed Tone Jacobson 10/14/2018   4:13 PM 
 
See nursing assessment Patient is alert and oriented x 4 and in no acute distress at this time. Respirations are at a regular rate, depth, and pattern. Patient updated on plan of care and has no questions or concerns at this time.

## 2018-11-14 ENCOUNTER — HOSPITAL ENCOUNTER (EMERGENCY)
Age: 38
Discharge: HOME | End: 2018-11-14
Attending: EMERGENCY MEDICINE
Payer: SELF-PAY

## 2018-11-14 ENCOUNTER — APPOINTMENT (OUTPATIENT)
Dept: CT IMAGING | Age: 38
End: 2018-11-14
Attending: EMERGENCY MEDICINE
Payer: SELF-PAY

## 2018-11-14 VITALS
RESPIRATION RATE: 22 BRPM | TEMPERATURE: 98 F | DIASTOLIC BLOOD PRESSURE: 92 MMHG | BODY MASS INDEX: 30.48 KG/M2 | HEIGHT: 73 IN | WEIGHT: 230 LBS | OXYGEN SATURATION: 95 % | SYSTOLIC BLOOD PRESSURE: 137 MMHG | HEART RATE: 87 BPM

## 2018-11-14 DIAGNOSIS — M54.9 ACUTE MIDLINE BACK PAIN, UNSPECIFIED BACK LOCATION: Primary | ICD-10-CM

## 2018-11-14 PROCEDURE — 72131 CT LUMBAR SPINE W/O DYE: CPT

## 2018-11-14 PROCEDURE — 74011250637 HC RX REV CODE- 250/637: Performed by: EMERGENCY MEDICINE

## 2018-11-14 PROCEDURE — 99284 EMERGENCY DEPT VISIT MOD MDM: CPT

## 2018-11-14 RX ORDER — DIAZEPAM 5 MG/1
5 TABLET ORAL
Status: COMPLETED | OUTPATIENT
Start: 2018-11-14 | End: 2018-11-14

## 2018-11-14 RX ORDER — IBUPROFEN 600 MG/1
600 TABLET ORAL
Status: COMPLETED | OUTPATIENT
Start: 2018-11-14 | End: 2018-11-14

## 2018-11-14 RX ADMIN — DIAZEPAM 5 MG: 5 TABLET ORAL at 19:37

## 2018-11-14 RX ADMIN — IBUPROFEN 600 MG: 600 TABLET ORAL at 19:37

## 2018-11-15 NOTE — ED PROVIDER NOTES
34 y.o. male with past medical history significant for Back pain, HTN, and Alcoholism who presents from home via EMS with chief complaint of back pain. EMS reports pt had \"lower\" spinal fusion \"15 years ago\" and has been experiencing intermittent episodes of \"back spasms\" ever since. Pt reports onset this evening of R sided back pain. EMS reports pt took valium and flexeril with no relief. EMS reports pt then \"got into a hot bath\" and was unable to get out, stating, \"we had to get him out of bath\". Pt reports he has not seen a back doctor since his \"insurance changed\". Pt denies fever, chills, and incontinence of bowel or bladder. There are no other acute medical concerns at this time. Old Chart Review: Pt was last [facility] at [facility] on 10/14/18 for acute stress reaction and EtOH intoxication. Social hx: Former smoker (Quit date: 4/8/14; 1 pck/day for 10 yrs); No EtOH use PCP: Fam Simons MD 
 
Note written by Elena Lindsey, as dictated by Daniel Blount MD at 7:01 PM 
 
 
 
The history is provided by the patient and the EMS personnel. No  was used. Past Medical History:  
Diagnosis Date  Alcoholism ([address]   
 in remission  Back pain  Chronic pain   
 back  Concussion  H/O spinal fusion 15 years ago  Hypertension  Insomnia Past Surgical History:  
Procedure Laterality Date  HX ORTHOPAEDIC Spinal fusion L5 S1 in 2003 Family History:  
Problem Relation Age of Onset  Diabetes Mother  Hypertension Mother  Thyroid Disease Mother   
     hypothyroidism  Heart Attack Mother   
     age 64-smoker Vidal Arthritis-osteo Mother  Asthma Mother  Heart Disease Mother  Elevated Lipids Father  Heart Disease Father  Hypertension Father  Diabetes Father  Cancer Father   
     prostate  Heart Attack Father   
     in 52's  Alcohol abuse Father  Arthritis-osteo Father  Psychiatric Disorder Father Social History Socioeconomic History  Marital status:  Spouse name: Not on file  Number of children: Not on file  Years of education: Not on file  Highest education level: Not on file Social Needs  Financial resource strain: Not on file  Food insecurity - worry: Not on file  Food insecurity - inability: Not on file  Transportation needs - medical: Not on file  Transportation needs - non-medical: Not on file Occupational History  Not on file Tobacco Use  Smoking status: Former Smoker Packs/day: 1.00 Years: 10.00 Pack years: 10.00 Types: Cigarettes Last attempt to quit: 2014 Years since quittin.6  Smokeless tobacco: Never Used Substance and Sexual Activity  Alcohol use: No  
 Drug use: No  
 Sexual activity: Yes  
  Partners: Female Birth control/protection: None Other Topics Concern  Not on file Social History Narrative  Not on file ALLERGIES: Patient has no known allergies. Review of Systems Constitutional: Negative for chills and fever. Respiratory: Negative for shortness of breath. Cardiovascular: Negative for chest pain. Gastrointestinal: Negative for abdominal pain, diarrhea, nausea and vomiting. Genitourinary: Negative for difficulty urinating, dysuria, frequency, hematuria and urgency. Musculoskeletal: Positive for back pain. All other systems reviewed and are negative. Vitals:  
 18 1904 BP: 119/82 Pulse: 90 Resp: 20 Temp: 98.3 °F (36.8 °C) SpO2: 98% Weight: 104.3 kg (230 lb) Height: 6' 1\" (1.854 m) Physical Exam  
Constitutional: He is oriented to person, place, and time. He appears well-developed and well-nourished. No distress. Uncomfortable appearing, AxOx4, speaking in complete sentences, in carry-gurney (BIBEMS); GCS = 15;  
 
  
HENT:  
Head: Normocephalic and atraumatic. Right Ear: External ear normal.  
Left Ear: External ear normal.  
Mouth/Throat: Oropharynx is clear and moist. No oropharyngeal exudate. Eyes: Conjunctivae and EOM are normal. Pupils are equal, round, and reactive to light. Right eye exhibits no discharge. Left eye exhibits no discharge. Neck: Normal range of motion. Neck supple. Cardiovascular: Normal rate, regular rhythm, normal heart sounds and intact distal pulses. Exam reveals no gallop and no friction rub. No murmur heard. Pulmonary/Chest: Effort normal and breath sounds normal. No respiratory distress. He has no wheezes. He has no rales. He exhibits no tenderness. Abdominal: Soft. Bowel sounds are normal. He exhibits no distension and no mass. There is no tenderness. There is no rebound and no guarding. nttp Genitourinary:  
Genitourinary Comments: Pt denies urinary/ Testicular/ scrotal or penile  Complaints No cauda equina sx Musculoskeletal: Normal range of motion. He exhibits no edema, tenderness or deformity. Pt had central/ paraspinal C/T/L spines, Upper/Lower ext long bones, Abdomen,  Pelvis, hands /feet and all joints palpated and tolerated well (except as above) ; no significant spasm palpated; Pt has motor/ CV / Sensation grossly intact to all extremities; L=R in strength Lymphadenopathy:  
  He has no cervical adenopathy. Neurological: He is alert and oriented to person, place, and time. He displays normal reflexes. No cranial nerve deficit or sensory deficit. He exhibits normal muscle tone. Coordination normal.  
Skin: Skin is warm and dry. Capillary refill takes 2 to 3 seconds. No rash noted. No erythema. Psychiatric: He has a normal mood and affect. Nursing note and vitals reviewed. [facility] Rectal exam 
Date/Time: 11/14/2018 8:02 PM 
Performed by: Lukasz Tan MD 
Authorized by: Lukasz Tan MD  
 
Consent:  
  Consent obtained:  Verbal and emergent situation Consent given by:  Patient Risks discussed:  Bleeding and pain Alternatives discussed:  No treatment Indications:  
  Indications:  R/o cauda equina sx Anesthesia (see MAR for exact dosages): Anesthesia method:  None Post-procedure details:  
  Patient tolerance of procedure: Tolerated well, no immediate complications Comments: No impaction/ fissure/ hemorrhoid/ good rectal tone 7:56 PM 
'now lying on R side; 'feels better now'; awaiting results;  
 
 9:34 PM Notified by nursing that pt states 'Im leaving now'; Pt sig other states 'youre not treating his pain properly'; Pt refusing further interventions/ tx. Told to follow-up with Dr Campbell/ Pain management. Pt is medically capable of making an informed decision, was made aware of the risks of leaving AMA ( including disability, DEATH)  Pt allowed to leave ama. Tess Michel Jr.'s  results have been reviewed with him. He has been counseled regarding his diagnosis. He verbally conveys understanding and agreement of the signs, symptoms, diagnosis, treatment and prognosis and additionally agrees to Call/ Arrange follow up as recommended with Dr. Daija Jones MD in 24 - 48 hours. He also agrees with the care-plan and conveys that all of his questions have been answered. I have also put together some discharge instructions for him that include: 1) educational information regarding their diagnosis, 2) how to care for their diagnosis at home, as well a 3) list of reasons why they would want to return to the ED prior to their follow-up appointment, should their condition change or for concerns.

## 2018-11-15 NOTE — ED TRIAGE NOTES
Arrived by EMS after not being able to get out of his bathtub. He has been having severe back pain and thought a hot bath would help/ complaining of severe back pain now. Hx of laminectomy.

## 2018-11-15 NOTE — DISCHARGE INSTRUCTIONS
Learning About Relief for Back Pain  What is back tension and strain? Back strain happens when you overstretch, or pull, a muscle in your back. You may hurt your back in an accident or when you exercise or lift something. Most back pain will get better with rest and time. You can take care of yourself at home to help your back heal.  What can you do first to relieve back pain? When you first feel back pain, try these steps:  · Walk. Take a short walk (10 to 20 minutes) on a level surface (no slopes, hills, or stairs) every 2 to 3 hours. Walk only distances you can manage without pain, especially leg pain. · Relax. Find a comfortable position for rest. Some people are comfortable on the floor or a medium-firm bed with a small pillow under their head and another under their knees. Some people prefer to lie on their side with a pillow between their knees. Don't stay in one position for too long. · Try heat or ice. Try using a heating pad on a low or medium setting, or take a warm shower, for 15 to 20 minutes every 2 to 3 hours. Or you can buy single-use heat wraps that last up to 8 hours. You can also try an ice pack for 10 to 15 minutes every 2 to 3 hours. You can use an ice pack or a bag of frozen vegetables wrapped in a thin towel. There is not strong evidence that either heat or ice will help, but you can try them to see if they help. You may also want to try switching between heat and cold. · Take pain medicine exactly as directed. ¨ If the doctor gave you a prescription medicine for pain, take it as prescribed. ¨ If you are not taking a prescription pain medicine, ask your doctor if you can take an over-the-counter medicine. What else can you do? · Stretch and exercise. Exercises that increase flexibility may relieve your pain and make it easier for your muscles to keep your spine in a good, neutral position. And don't forget to keep walking. · Do self-massage.  You can use self-massage to unwind after work or school or to energize yourself in the morning. You can easily massage your feet, hands, or neck. Self-massage works best if you are in comfortable clothes and are sitting or lying in a comfortable position. Use oil or lotion to massage bare skin. · Reduce stress. Back pain can lead to a vicious [place]: Distress about the pain tenses the muscles in your back, which in turn causes more pain. Learn how to relax your mind and your muscles to lower your stress. Where can you learn more? Go to http://dell-sherie.info/. Enter Q456 in the search box to learn more about \"Learning About Relief for Back Pain. \"  Current as of: March 21, 2017  Content Version: 11.5  © 7104-6880 [employer], Incorporated. Care instructions adapted under license by "[employer] (which disclaims liability or warranty for this information). If you have questions about a medical condition or this instruction, always ask your healthcare professional. [place] [address] any warranty or liability for your use of this information.

## 2020-01-20 ENCOUNTER — APPOINTMENT (OUTPATIENT)
Dept: CT IMAGING | Age: 40
End: 2020-01-20
Attending: EMERGENCY MEDICINE
Payer: COMMERCIAL

## 2020-01-20 ENCOUNTER — HOSPITAL ENCOUNTER (EMERGENCY)
Age: 40
Discharge: HOME | End: 2020-01-20
Attending: EMERGENCY MEDICINE
Payer: COMMERCIAL

## 2020-01-20 VITALS
HEIGHT: 73 IN | OXYGEN SATURATION: 94 % | HEART RATE: 95 BPM | WEIGHT: 226.63 LBS | SYSTOLIC BLOOD PRESSURE: 131 MMHG | DIASTOLIC BLOOD PRESSURE: 79 MMHG | TEMPERATURE: 98.4 F | BODY MASS INDEX: 30.04 KG/M2 | RESPIRATION RATE: 14 BRPM

## 2020-01-20 DIAGNOSIS — M54.41 ACUTE LOW BACK PAIN WITH RIGHT-SIDED SCIATICA, UNSPECIFIED BACK PAIN LATERALITY: ICD-10-CM

## 2020-01-20 DIAGNOSIS — M54.41 MIDLINE LOW BACK PAIN WITH RIGHT-SIDED SCIATICA, UNSPECIFIED CHRONICITY: Primary | ICD-10-CM

## 2020-01-20 DIAGNOSIS — M48.07 SPINAL STENOSIS OF LUMBOSACRAL REGION: ICD-10-CM

## 2020-01-20 PROCEDURE — 96372 THER/PROPH/DIAG INJ SC/IM: CPT

## 2020-01-20 PROCEDURE — 74011250636 HC RX REV CODE- 250/636: Performed by: EMERGENCY MEDICINE

## 2020-01-20 PROCEDURE — 99284 EMERGENCY DEPT VISIT MOD MDM: CPT

## 2020-01-20 PROCEDURE — 74011250637 HC RX REV CODE- 250/637: Performed by: EMERGENCY MEDICINE

## 2020-01-20 PROCEDURE — 74011000250 HC RX REV CODE- 250: Performed by: EMERGENCY MEDICINE

## 2020-01-20 PROCEDURE — 72131 CT LUMBAR SPINE W/O DYE: CPT

## 2020-01-20 RX ORDER — KETOROLAC TROMETHAMINE 30 MG/ML
30 INJECTION, SOLUTION INTRAMUSCULAR; INTRAVENOUS
Status: COMPLETED | OUTPATIENT
Start: 2020-01-20 | End: 2020-01-20

## 2020-01-20 RX ORDER — METHOCARBAMOL 500 MG/1
500 TABLET, FILM COATED ORAL ONCE
Status: COMPLETED | OUTPATIENT
Start: 2020-01-20 | End: 2020-01-20

## 2020-01-20 RX ORDER — IBUPROFEN 600 MG/1
600 TABLET ORAL
Qty: 30 TAB | Refills: 0 | Status: SHIPPED | OUTPATIENT
Start: 2020-01-20

## 2020-01-20 RX ORDER — OXYCODONE AND ACETAMINOPHEN 5; 325 MG/1; MG/1
1 TABLET ORAL
Status: COMPLETED | OUTPATIENT
Start: 2020-01-20 | End: 2020-01-20

## 2020-01-20 RX ORDER — IBUPROFEN 600 MG/1
600 TABLET ORAL
COMMUNITY
End: 2020-01-20

## 2020-01-20 RX ORDER — LIDOCAINE 50 MG/G
1 PATCH TOPICAL
Status: DISCONTINUED | OUTPATIENT
Start: 2020-01-20 | End: 2020-01-21 | Stop reason: HOSPADM

## 2020-01-20 RX ORDER — LIDOCAINE 50 MG/G
1 PATCH TOPICAL EVERY 24 HOURS
Qty: 5 PATCH | Refills: 0 | Status: SHIPPED | OUTPATIENT
Start: 2020-01-20

## 2020-01-20 RX ORDER — ACETAMINOPHEN 500 MG
1000 TABLET ORAL 3 TIMES DAILY
Qty: 24 TAB | Refills: 0 | Status: SHIPPED | OUTPATIENT
Start: 2020-01-20 | End: 2020-01-24

## 2020-01-20 RX ORDER — METHOCARBAMOL 500 MG/1
500 TABLET, FILM COATED ORAL
Qty: 20 TAB | Refills: 0 | Status: SHIPPED | OUTPATIENT
Start: 2020-01-20 | End: 2020-01-25

## 2020-01-20 RX ORDER — OXYCODONE HYDROCHLORIDE 5 MG/1
5 TABLET ORAL
Qty: 12 TAB | Refills: 0 | Status: SHIPPED | OUTPATIENT
Start: 2020-01-20 | End: 2020-01-23

## 2020-01-20 RX ADMIN — METHOCARBAMOL TABLETS 500 MG: 500 TABLET, COATED ORAL at 20:29

## 2020-01-20 RX ADMIN — OXYCODONE HYDROCHLORIDE AND ACETAMINOPHEN 1 TABLET: 5; 325 TABLET ORAL at 20:29

## 2020-01-20 RX ADMIN — KETOROLAC TROMETHAMINE 30 MG: 30 INJECTION, SOLUTION INTRAMUSCULAR at 20:28

## 2020-01-20 RX ADMIN — OXYCODONE HYDROCHLORIDE AND ACETAMINOPHEN 1 TABLET: 5; 325 TABLET ORAL at 21:45

## 2020-01-21 NOTE — ED NOTES
Patient  given copy of dc instructions and  script(s). Patient (s)  verbalized understanding of instructions and script (s). Patient given a current medication reconciliation form and verbalized understanding of their medications. Patient  verbalized understanding of the importance of discussing medications with  his or her physician or clinic they will be following up with. Patient alert and oriented and in no acute distress. Patient discharged home ambulatory with friend.

## 2020-01-21 NOTE — ED PROVIDER NOTES
27-year-old male with history of spinal fusion L5/S1 in 2003 presenting to the ER with acute on chronic back pain. Patient reports that he was exercising at the gym with his  when he suddenly had worsened pain in his lower back that shoots down his right leg. Patient denies any trauma or injuries to the back. Patient reports mild tingling in his right foot which typically occurs when he has an exacerbation of his back pain. Patient denying IV drug use, fevers or chills or any recent back injections. No saddle anesthesia or bowel or bladder incontinence.   No abdominal pain             Past Medical History:   Diagnosis Date    Alcoholism ([facility] [address]     in remission    Back pain     Chronic pain     back    Concussion     H/O spinal fusion     15 years ago    Hypertension     Insomnia        Past Surgical History:   Procedure Laterality Date    HX ORTHOPAEDIC      Spinal fusion L5 S1 in 2003         Family History:   Problem Relation Age of Onset   Demi Mccormick Diabetes Mother     Hypertension Mother     Thyroid Disease Mother         hypothyroidism    Heart Attack Mother         age 64-smoker    Demi Mccormick Arthritis-osteo Mother     Asthma Mother     Heart Disease Mother     Elevated Lipids Father     Heart Disease Father     Hypertension Father     Diabetes Father     Cancer Father         prostate    Heart Attack Father         in 52's    Alcohol abuse Father     Arthritis-osteo Father     Psychiatric Disorder Father        Social History     Socioeconomic History    Marital status:      Spouse name: Not on file    Number of children: Not on file    Years of education: Not on file    Highest education level: Not on file   Occupational History    Not on file   Social Needs    Financial resource strain: Not on file    Food insecurity:     Worry: Not on file     Inability: Not on file    Transportation needs:     Medical: Not on file     Non-medical: Not on file   Tobacco Use    Smoking status: Former Smoker     Packs/day: 1.00     Years: 10.00     Pack years: 10.00     Types: Cigarettes     Last attempt to quit: 2014     Years since quittin.7    Smokeless tobacco: Current User   Substance and Sexual Activity    Alcohol use: No    Drug use: No    Sexual activity: Yes     Partners: Female     Birth control/protection: None   Lifestyle    Physical activity:     Days per week: Not on file     Minutes per session: Not on file    Stress: Not on file   Relationships    Social connections:     Talks on phone: Not on file     Gets together: Not on file     Attends Orthodox service: Not on file     Active member of club or organization: Not on file     Attends meetings of clubs or organizations: Not on file     Relationship status: Not on file    Intimate partner violence:     Fear of current or ex partner: Not on file     Emotionally abused: Not on file     Physically abused: Not on file     Forced sexual activity: Not on file   Other Topics Concern    Not on file   Social History Narrative    Not on file         ALLERGIES: Patient has no known allergies. Review of Systems   Constitutional: Negative for chills and fever. HENT: Negative for congestion and sore throat. Eyes: Negative for pain. Respiratory: Negative for shortness of breath. Cardiovascular: Negative for chest pain. Gastrointestinal: Negative for abdominal pain, diarrhea, nausea and vomiting. Genitourinary: Negative for dysuria, flank pain, hematuria, scrotal swelling, testicular pain and urgency. Musculoskeletal: Positive for back pain. Negative for neck pain. Skin: Negative for rash. Neurological: Negative for dizziness, weakness, numbness and headaches.        Vitals:    20 1957   BP: 153/72   Pulse: 95   Resp: 18   Temp: 98.4 °F (36.9 °C)   SpO2: 98%   Weight: 102.8 kg (226 lb 10.1 oz)   Height: 6' 1\" (1.854 m)            Physical Exam  Constitutional:       Appearance: He is well-developed. HENT:      Head: Normocephalic. Eyes:      Conjunctiva/sclera: Conjunctivae normal.   Neck:      Musculoskeletal: Normal range of motion and neck supple. Cardiovascular:      Rate and Rhythm: Normal rate and regular rhythm. Pulmonary:      Effort: Pulmonary effort is normal. No respiratory distress. Breath sounds: Normal breath sounds. Abdominal:      General: Bowel sounds are normal.      Palpations: Abdomen is soft. Tenderness: There is no tenderness. Musculoskeletal: Normal range of motion. Lumbar back: He exhibits tenderness, bony tenderness and pain. He exhibits no deformity. Back:       Comments: Positive straight leg raise   Skin:     General: Skin is warm. Capillary Refill: Capillary refill takes less than 2 seconds. Findings: No rash. Neurological:      Mental Status: He is alert and oriented to person, place, and time. Sensory: Sensation is intact. Motor: Motor function is intact. Deep Tendon Reflexes: Babinski sign absent on the right side. Babinski sign absent on the left side. Reflex Scores:       Patellar reflexes are 2+ on the right side and 2+ on the left side. Achilles reflexes are 2+ on the right side and 2+ on the left side. Comments: No gross motor or sensory deficits          MDM  Number of Diagnoses or Management Options  Midline low back pain with right-sided sciatica, unspecified chronicity:   Spinal stenosis of lumbosacral region:   Diagnosis management comments: Patient with known history of L5/S1 fusion presenting with acute exacerbation of his back pain. Imaging showing no fractures. Patient has no neurologic deficits. Post void residual less than 50 mL  Discuss pain management.   We reviewed  including multiple states in which patient is recently lived in/visited with no recent narcotic prescriptions  Given referral to spine specialist  Advised no heavy lifting discussed symptomatic treatment       Amount and/or Complexity of Data Reviewed  Tests in the radiology section of CPT®: reviewed           Procedures    No results found for this or any previous visit (from the past 24 hour(s)). Ct Spine Lumb Wo Cont    Result Date: 1/20/2020  EXAM:  CT LUMBAR SPINE WITHOUT  CONTRAST INDICATION: Increasing low back pain while driving tonight. No specific injury. Previous lumbar spine surgery. COMPARISON: CT lumbar spine on 11/14/2018. CONTRAST:  None. TECHNIQUE: Multislice helical CT of the lumbar spine was performed without intravenous contrast administration. Coronal and sagittal reconstructions were generated. CT dose reduction was achieved through use of a standardized protocol tailored for this examination and automatic exposure control for dose modulation. FINDINGS: Posterior fusion hardware spans L5-S1. 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 No hardware complication. The alignment is within normal limits. There is no fracture or compression deformity. The paravertebral soft tissues are within normal limits. Lower thoracic spine: No stenosis. Yenny Gearing L1-L2: No stenosis. L2-L3: No stenosis. L3-L4: Diffuse disc bulge. No stenosis. L4-L5: Diffuse disc bulge, facet arthrosis, and thickened ligamentum flavum. Metal artifact. Mild central spinal canal stenosis. L5-S1: Posterior decompression. No stenosis. IMPRESSION: No fracture or hardware complication. Mild central spinal canal stenosis at L4-L5.

## 2020-01-21 NOTE — ED TRIAGE NOTES
Patient arrives via EMS for c/o back pain. Hx of sciatica and spinal fusion. Pain worsening tonight while driving to a meeting. Unable to stand on his own per EMS.

## 2020-01-21 NOTE — DISCHARGE INSTRUCTIONS
Patient Education        Back Pain: Care Instructions  Your Care Instructions    Back pain has many possible causes. It is often related to problems with muscles and ligaments of the back. It may also be related to problems with the nerves, discs, or bones of the back. Moving, lifting, standing, sitting, or sleeping in an awkward way can strain the back. Sometimes you don't notice the injury until later. Arthritis is another common cause of back pain. Although it may hurt a lot, back pain usually improves on its own within several weeks. Most people recover in 12 weeks or less. Using good home treatment and being careful not to stress your back can help you feel better sooner. Follow-up care is a key part of your treatment and safety. Be sure to make and go to all appointments, and call your doctor if you are having problems. It's also a good idea to know your test results and keep a list of the medicines you take. How can you care for yourself at home? · Sit or lie in positions that are most comfortable and reduce your pain. Try one of these positions when you lie down:  ? Lie on your back with your knees bent and supported by large pillows. ? Lie on the floor with your legs on the seat of a sofa or chair. ? Lie on your side with your knees and hips bent and a pillow between your legs. ? Lie on your stomach if it does not make pain worse. · Do not sit up in bed, and avoid soft couches and twisted positions. Bed rest can help relieve pain at first, but it delays healing. Avoid bed rest after the first day of back pain. · Change positions every 30 minutes. If you must sit for long periods of time, take breaks from sitting. Get up and walk around, or lie in a comfortable position. · Try using a heating pad on a low or medium setting for 15 to 20 minutes every 2 or 3 hours. Try a warm shower in place of one session with the heating pad. · You can also try an ice pack for 10 to 15 minutes every 2 to 3 hours. Put a thin cloth between the ice pack and your skin. · Take pain medicines exactly as directed. ? If the doctor gave you a prescription medicine for pain, take it as prescribed. ? If you are not taking a prescription pain medicine, ask your doctor if you can take an over-the-counter medicine. · Take short walks several times a day. You can start with 5 to 10 minutes, 3 or 4 times a day, and work up to longer walks. Walk on level surfaces and avoid hills and stairs until your back is better. · Return to work and other activities as soon as you can. Continued rest without activity is usually not good for your back. · To prevent future back pain, do exercises to stretch and strengthen your back and stomach. Learn how to use good posture, safe lifting techniques, and proper body mechanics. When should you call for help? Call your doctor now or seek immediate medical care if:    · You have new or worsening numbness in your legs.     · You have new or worsening weakness in your legs. (This could make it hard to stand up.)     · You lose control of your bladder or bowels.    Watch closely for changes in your health, and be sure to contact your doctor if:    · You have a fever, lose weight, or don't feel well.     · You do not get better as expected. Where can you learn more? Go to http://dell-sherie.info/. Enter G740 in the search box to learn more about \"Back Pain: Care Instructions. \"  Current as of: June 26, 2019  Content Version: 12.2  © 4014-1582 [employer]. Care instructions adapted under license by [employer] (which disclaims liability or warranty for this information). If you have questions about a medical condition or this instruction, always ask your healthcare professional. [place] [address] any warranty or liability for your use of this information.

## 2020-05-03 ENCOUNTER — HOSPITAL ENCOUNTER (INPATIENT)
Age: 40
LOS: 9 days | Discharge: LEFT BEFORE DISCHARGE | DRG: 894 | End: 2020-05-12
Attending: EMERGENCY MEDICINE | Admitting: INTERNAL MEDICINE
Payer: SELF-PAY

## 2020-05-03 DIAGNOSIS — F10.939 ALCOHOL WITHDRAWAL SYNDROME WITH COMPLICATION (HCC): Primary | ICD-10-CM

## 2020-05-03 LAB
ALBUMIN SERPL-MCNC: 4 G/DL (ref 3.5–5)
ALBUMIN/GLOB SERPL: 1.3 {RATIO} (ref 1.1–2.2)
ALP SERPL-CCNC: 68 U/L (ref 45–117)
ALT SERPL-CCNC: 34 U/L (ref 12–78)
AMPHET UR QL SCN: NEGATIVE
ANION GAP SERPL CALC-SCNC: 15 MMOL/L (ref 5–15)
APAP SERPL-MCNC: <2 UG/ML (ref 10–30)
APPEARANCE UR: CLEAR
AST SERPL-CCNC: 34 U/L (ref 15–37)
BACTERIA URNS QL MICRO: NEGATIVE /HPF
BARBITURATES UR QL SCN: NEGATIVE
BASOPHILS # BLD: 0 K/UL (ref 0–0.1)
BASOPHILS NFR BLD: 0 % (ref 0–1)
BENZODIAZ UR QL: NEGATIVE
BILIRUB SERPL-MCNC: 0.6 MG/DL (ref 0.2–1)
BILIRUB UR QL: NEGATIVE
BUN SERPL-MCNC: 9 MG/DL (ref 6–20)
BUN/CREAT SERPL: 8 (ref 12–20)
CALCIUM SERPL-MCNC: 8.5 MG/DL (ref 8.5–10.1)
CANNABINOIDS UR QL SCN: NEGATIVE
CHLORIDE SERPL-SCNC: 98 MMOL/L (ref 97–108)
CO2 SERPL-SCNC: 24 MMOL/L (ref 21–32)
COCAINE UR QL SCN: NEGATIVE
COLOR UR: ABNORMAL
COMMENT, HOLDF: NORMAL
CREAT SERPL-MCNC: 1.13 MG/DL (ref 0.7–1.3)
DIFFERENTIAL METHOD BLD: ABNORMAL
DRUG SCRN COMMENT,DRGCM: NORMAL
EOSINOPHIL # BLD: 0 K/UL (ref 0–0.4)
EOSINOPHIL NFR BLD: 0 % (ref 0–7)
EPITH CASTS URNS QL MICRO: ABNORMAL /LPF
ERYTHROCYTE [DISTWIDTH] IN BLOOD BY AUTOMATED COUNT: 13.3 % (ref 11.5–14.5)
ETHANOL SERPL-MCNC: 117 MG/DL
GLOBULIN SER CALC-MCNC: 3.1 G/DL (ref 2–4)
GLUCOSE SERPL-MCNC: 145 MG/DL (ref 65–100)
GLUCOSE UR STRIP.AUTO-MCNC: NEGATIVE MG/DL
HCT VFR BLD AUTO: 41.7 % (ref 36.6–50.3)
HGB BLD-MCNC: 14.4 G/DL (ref 12.1–17)
HGB UR QL STRIP: NEGATIVE
IMM GRANULOCYTES # BLD AUTO: 0 K/UL (ref 0–0.04)
IMM GRANULOCYTES NFR BLD AUTO: 0 % (ref 0–0.5)
KETONES UR QL STRIP.AUTO: NEGATIVE MG/DL
LEUKOCYTE ESTERASE UR QL STRIP.AUTO: NEGATIVE
LYMPHOCYTES # BLD: 0.9 K/UL (ref 0.8–3.5)
LYMPHOCYTES NFR BLD: 13 % (ref 12–49)
MAGNESIUM SERPL-MCNC: 1.6 MG/DL (ref 1.6–2.4)
MCH RBC QN AUTO: 31.6 PG (ref 26–34)
MCHC RBC AUTO-ENTMCNC: 34.5 G/DL (ref 30–36.5)
MCV RBC AUTO: 91.4 FL (ref 80–99)
METHADONE UR QL: NEGATIVE
MONOCYTES # BLD: 0.8 K/UL (ref 0–1)
MONOCYTES NFR BLD: 11 % (ref 5–13)
NEUTS SEG # BLD: 5.2 K/UL (ref 1.8–8)
NEUTS SEG NFR BLD: 76 % (ref 32–75)
NITRITE UR QL STRIP.AUTO: NEGATIVE
NRBC # BLD: 0 K/UL (ref 0–0.01)
NRBC BLD-RTO: 0 PER 100 WBC
OPIATES UR QL: NEGATIVE
PCP UR QL: NEGATIVE
PH UR STRIP: 5.5 [PH] (ref 5–8)
PLATELET # BLD AUTO: 328 K/UL (ref 150–400)
PMV BLD AUTO: 9.6 FL (ref 8.9–12.9)
POTASSIUM SERPL-SCNC: 3 MMOL/L (ref 3.5–5.1)
PROT SERPL-MCNC: 7.1 G/DL (ref 6.4–8.2)
PROT UR STRIP-MCNC: ABNORMAL MG/DL
RBC # BLD AUTO: 4.56 M/UL (ref 4.1–5.7)
RBC #/AREA URNS HPF: ABNORMAL /HPF (ref 0–5)
SALICYLATES SERPL-MCNC: <1.7 MG/DL (ref 2.8–20)
SAMPLES BEING HELD,HOLD: NORMAL
SODIUM SERPL-SCNC: 137 MMOL/L (ref 136–145)
SP GR UR REFRACTOMETRY: 1.02 (ref 1–1.03)
TROPONIN I SERPL-MCNC: <0.05 NG/ML
UR CULT HOLD, URHOLD: NORMAL
UROBILINOGEN UR QL STRIP.AUTO: 0.2 EU/DL (ref 0.2–1)
WBC # BLD AUTO: 7 K/UL (ref 4.1–11.1)
WBC URNS QL MICRO: ABNORMAL /HPF (ref 0–4)

## 2020-05-03 PROCEDURE — 96376 TX/PRO/DX INJ SAME DRUG ADON: CPT

## 2020-05-03 PROCEDURE — 65660000001 HC RM ICU INTERMED STEPDOWN

## 2020-05-03 PROCEDURE — 81001 URINALYSIS AUTO W/SCOPE: CPT

## 2020-05-03 PROCEDURE — 74011250636 HC RX REV CODE- 250/636: Performed by: INTERNAL MEDICINE

## 2020-05-03 PROCEDURE — 93005 ELECTROCARDIOGRAM TRACING: CPT

## 2020-05-03 PROCEDURE — 80053 COMPREHEN METABOLIC PANEL: CPT

## 2020-05-03 PROCEDURE — 99285 EMERGENCY DEPT VISIT HI MDM: CPT

## 2020-05-03 PROCEDURE — 77030040831 HC BAG URINE DRNG MDII -A

## 2020-05-03 PROCEDURE — 80307 DRUG TEST PRSMV CHEM ANLYZR: CPT

## 2020-05-03 PROCEDURE — 74011250636 HC RX REV CODE- 250/636: Performed by: PHYSICIAN ASSISTANT

## 2020-05-03 PROCEDURE — 83735 ASSAY OF MAGNESIUM: CPT

## 2020-05-03 PROCEDURE — 84484 ASSAY OF TROPONIN QUANT: CPT

## 2020-05-03 PROCEDURE — 36415 COLL VENOUS BLD VENIPUNCTURE: CPT

## 2020-05-03 PROCEDURE — 85025 COMPLETE CBC W/AUTO DIFF WBC: CPT

## 2020-05-03 PROCEDURE — 96361 HYDRATE IV INFUSION ADD-ON: CPT

## 2020-05-03 PROCEDURE — 74011250637 HC RX REV CODE- 250/637: Performed by: PHYSICIAN ASSISTANT

## 2020-05-03 RX ORDER — POTASSIUM CHLORIDE 750 MG/1
40 TABLET, FILM COATED, EXTENDED RELEASE ORAL
Status: COMPLETED | OUTPATIENT
Start: 2020-05-03 | End: 2020-05-03

## 2020-05-03 RX ORDER — SODIUM CHLORIDE 0.9 % (FLUSH) 0.9 %
5-40 SYRINGE (ML) INJECTION EVERY 8 HOURS
Status: DISCONTINUED | OUTPATIENT
Start: 2020-05-04 | End: 2020-05-12 | Stop reason: HOSPADM

## 2020-05-03 RX ORDER — IBUPROFEN 200 MG
1 TABLET ORAL EVERY 24 HOURS
Status: DISCONTINUED | OUTPATIENT
Start: 2020-05-04 | End: 2020-05-12 | Stop reason: HOSPADM

## 2020-05-03 RX ORDER — LORAZEPAM 2 MG/ML
2 INJECTION INTRAMUSCULAR
Status: DISPENSED | OUTPATIENT
Start: 2020-05-03 | End: 2020-05-03

## 2020-05-03 RX ORDER — LORAZEPAM 2 MG/ML
2 INJECTION INTRAMUSCULAR
Status: DISCONTINUED | OUTPATIENT
Start: 2020-05-03 | End: 2020-05-04

## 2020-05-03 RX ORDER — ONDANSETRON 2 MG/ML
4 INJECTION INTRAMUSCULAR; INTRAVENOUS
Status: DISCONTINUED | OUTPATIENT
Start: 2020-05-03 | End: 2020-05-12 | Stop reason: HOSPADM

## 2020-05-03 RX ORDER — POTASSIUM CHLORIDE 14.9 MG/ML
10 INJECTION INTRAVENOUS
Status: COMPLETED | OUTPATIENT
Start: 2020-05-04 | End: 2020-05-04

## 2020-05-03 RX ORDER — HEPARIN SODIUM 5000 [USP'U]/ML
5000 INJECTION, SOLUTION INTRAVENOUS; SUBCUTANEOUS EVERY 8 HOURS
Status: DISCONTINUED | OUTPATIENT
Start: 2020-05-04 | End: 2020-05-05

## 2020-05-03 RX ORDER — BISACODYL 5 MG
5 TABLET, DELAYED RELEASE (ENTERIC COATED) ORAL DAILY PRN
Status: DISCONTINUED | OUTPATIENT
Start: 2020-05-03 | End: 2020-05-12 | Stop reason: HOSPADM

## 2020-05-03 RX ORDER — ACETAMINOPHEN 325 MG/1
650 TABLET ORAL
Status: DISCONTINUED | OUTPATIENT
Start: 2020-05-03 | End: 2020-05-12 | Stop reason: HOSPADM

## 2020-05-03 RX ORDER — SODIUM CHLORIDE 0.9 % (FLUSH) 0.9 %
5-40 SYRINGE (ML) INJECTION AS NEEDED
Status: DISCONTINUED | OUTPATIENT
Start: 2020-05-03 | End: 2020-05-12 | Stop reason: HOSPADM

## 2020-05-03 RX ORDER — LORAZEPAM 2 MG/ML
1 INJECTION INTRAMUSCULAR
Status: COMPLETED | OUTPATIENT
Start: 2020-05-03 | End: 2020-05-03

## 2020-05-03 RX ORDER — LORAZEPAM 2 MG/ML
4 INJECTION INTRAMUSCULAR
Status: DISCONTINUED | OUTPATIENT
Start: 2020-05-03 | End: 2020-05-04

## 2020-05-03 RX ORDER — SODIUM CHLORIDE, SODIUM LACTATE, POTASSIUM CHLORIDE, CALCIUM CHLORIDE 600; 310; 30; 20 MG/100ML; MG/100ML; MG/100ML; MG/100ML
125 INJECTION, SOLUTION INTRAVENOUS CONTINUOUS
Status: DISCONTINUED | OUTPATIENT
Start: 2020-05-04 | End: 2020-05-05

## 2020-05-03 RX ADMIN — LORAZEPAM 1 MG: 2 INJECTION INTRAMUSCULAR; INTRAVENOUS at 20:10

## 2020-05-03 RX ADMIN — LORAZEPAM 2 MG: 2 INJECTION INTRAMUSCULAR; INTRAVENOUS at 22:39

## 2020-05-03 RX ADMIN — LORAZEPAM 2 MG: 2 INJECTION INTRAMUSCULAR; INTRAVENOUS at 23:14

## 2020-05-03 RX ADMIN — SODIUM CHLORIDE 1000 ML: 900 INJECTION, SOLUTION INTRAVENOUS at 17:32

## 2020-05-03 RX ADMIN — POTASSIUM CHLORIDE 40 MEQ: 750 TABLET, FILM COATED, EXTENDED RELEASE ORAL at 18:23

## 2020-05-03 RX ADMIN — SODIUM CHLORIDE, SODIUM LACTATE, POTASSIUM CHLORIDE, AND CALCIUM CHLORIDE 125 ML/HR: 600; 310; 30; 20 INJECTION, SOLUTION INTRAVENOUS at 23:55

## 2020-05-03 RX ADMIN — POTASSIUM CHLORIDE 10 MEQ: 200 INJECTION, SOLUTION INTRAVENOUS at 23:45

## 2020-05-03 NOTE — ED PROVIDER NOTES
Josh Roberts. is a 37 y.o. male  who presents by private vehicle to ER with c/o Patient presents with:  Alcohol Problem  Patient presents with complaints of alcohol problem. Patient is poor historian, reports history of alcohol abuse, recently relapsed, unsure of how much he has been drinking. Patient denies any complaints at this time. He specifically denies any fevers, chills, nausea, vomiting, chest pain, shortness of breath, headache, rash, diarrhea, abdominal pain, urinary/bowel changes, sweating or weight loss. PCP: Prabha Bergman MD   PMHx significant for: Past Medical History:  No date: Alcoholism [facility])      Comment:  in remission  No date: Back pain  No date: Chronic pain      Comment:  back  No date: Concussion  No date: H/O spinal fusion      Comment:  15 years ago  No date: Hypertension  No date: Insomnia   PSHx significant for: Past Surgical History:  No date: HX ORTHOPAEDIC      Comment:  Spinal fusion L5 S1 in   Social Hx: Tobacco use: Social History    Tobacco Use      Smoking status: Former Smoker        Packs/day: 1.00        Years: 10.00        Pack years: 10        Types: Cigarettes        Quit date: 2014        Years since quittin.0      Smokeless tobacco: Current User  ; EtOH use: The patient states he drinks 0 per week.; Illicit Drug use: Allergies:  No Known Allergies    There are no other complaints, changes or physical findings at this time.               Past Medical History:   Diagnosis Date    Alcoholism ([facility] [address]     in remission    Back pain     Chronic pain     back    Concussion     H/O spinal fusion     15 years ago    Hypertension     Insomnia        Past Surgical History:   Procedure Laterality Date    HX ORTHOPAEDIC      Spinal fusion L5 S1 in          Family History:   Problem Relation Age of Onset    Diabetes Mother     Hypertension Mother     Thyroid Disease Mother         hypothyroidism    Heart Attack Mother         age 64-smoker     Arthritis-osteo Mother     Asthma Mother     Heart Disease Mother     Elevated Lipids Father     Heart Disease Father     Hypertension Father     Diabetes Father     Cancer Father         prostate    Heart Attack Father         in 54's    Alcohol abuse Father     Arthritis-osteo Father     Psychiatric Disorder Father        Social History     Socioeconomic History    Marital status:      Spouse name: Not on file    Number of children: Not on file    Years of education: Not on file    Highest education level: Not on file   Occupational History    Not on file   Social Needs    Financial resource strain: Not on file    Food insecurity     Worry: Not on file     Inability: Not on file    Transportation needs     Medical: Not on file     Non-medical: Not on file   Tobacco Use    Smoking status: Former Smoker     Packs/day: 1.00     Years: 10.00     Pack years: 10.00     Types: Cigarettes     Last attempt to quit: 2014     Years since quittin.0    Smokeless tobacco: Current User   Substance and Sexual Activity    Alcohol use: No    Drug use: No    Sexual activity: Yes     Partners: Female     Birth control/protection: None   Lifestyle    Physical activity     Days per week: Not on file     Minutes per session: Not on file    Stress: Not on file   Relationships    Social connections     Talks on phone: Not on file     Gets together: Not on file     Attends Mandaen service: Not on file     Active member of club or organization: Not on file     Attends meetings of clubs or organizations: Not on file     Relationship status: Not on file    Intimate partner violence     Fear of current or ex partner: Not on file     Emotionally abused: Not on file     Physically abused: Not on file     Forced sexual activity: Not on file   Other Topics Concern    Not on file   Social History Narrative    Not on file         ALLERGIES: Patient has no known allergies.     Review of Systems   Constitutional: Negative for activity change, appetite change, chills and fever. HENT: Negative for congestion and sore throat. Respiratory: Negative for cough and shortness of breath. Cardiovascular: Positive for palpitations. Negative for chest pain. Gastrointestinal: Negative for abdominal pain, diarrhea, nausea and vomiting. Genitourinary: Negative for dysuria. Musculoskeletal: Negative for arthralgias and myalgias. Skin: Negative for color change. Neurological: Negative for dizziness. Psychiatric/Behavioral: The patient is nervous/anxious. All other systems reviewed and are negative. Vitals:    05/03/20 1705   BP: (!) 164/94   Pulse: (!) 132   Resp: 20   Temp: 98.4 °F (36.9 °C)   SpO2: 97%   Weight: 97.5 kg (215 lb)   Height: 6' 1\" (1.854 m)            Physical Exam  Vitals signs and nursing note reviewed. Constitutional:       Appearance: He is well-developed. HENT:      Head: Normocephalic and atraumatic. Right Ear: External ear normal.      Left Ear: External ear normal.      Mouth/Throat:      Pharynx: No oropharyngeal exudate. Eyes:      General: No scleral icterus. Right eye: No discharge. Left eye: No discharge. Conjunctiva/sclera: Conjunctivae normal.      Pupils: Pupils are equal, round, and reactive to light. Neck:      Musculoskeletal: Normal range of motion and neck supple. Thyroid: No thyromegaly. Trachea: No tracheal deviation. Cardiovascular:      Rate and Rhythm: Regular rhythm. Tachycardia present. Heart sounds: Normal heart sounds. No murmur. Pulmonary:      Effort: Pulmonary effort is normal. No respiratory distress. Breath sounds: Normal breath sounds. No wheezing or rales. Abdominal:      General: Bowel sounds are normal. There is no distension. Palpations: Abdomen is soft. Tenderness: There is no abdominal tenderness. There is no guarding or rebound.    Musculoskeletal: Normal range of motion. General: No tenderness. Lymphadenopathy:      Cervical: No cervical adenopathy. Skin:     General: Skin is warm. Findings: No erythema or rash. Neurological:      Mental Status: He is alert. He is disoriented. Cranial Nerves: No cranial nerve deficit. Motor: Tremor present. Coordination: Coordination normal.      Comments: Oriented to place and self, not oriented to time   Psychiatric:         Mood and Affect: Mood is anxious. Behavior: Behavior normal.         Thought Content: Thought content normal.         Judgment: Judgment normal.          MDM       Procedures                   Hospitalist Perfect Serve for Admission  8:51 PM    ED Room Number: KX90/52  Patient Name and age:  Nakul Maurice. 37 y.o.  male  Working Diagnosis:   1. Alcohol withdrawal syndrome with complication ([address]        COVID-19 Suspicion:  no    Readmission: no  Isolation Requirements:  no  Recommended Level of Care:  telemetry  Department:[facility] Adult ED - 21   Other:  History of Alcohol withdrawal with DTs and seizures, went on 2 day drinking binge. Last drink yesterday afternoon.  Patient is diaphoretic, tremulous and tachycardic in ED

## 2020-05-03 NOTE — ED TRIAGE NOTES
Pt arrives c/o alcohol problem. Pt reports last alcoholic drink was yesterday afternoon. Pt drinks about a liter of bourbon daily. Pt denies n/v, headaches, denies hallucinations. Pt is very tremorus and anxious in triage. Pt reports hx of DTs. Pt alert to self, situation, place. Disoriented to time pt states feb 1999.

## 2020-05-04 ENCOUNTER — APPOINTMENT (OUTPATIENT)
Dept: GENERAL RADIOLOGY | Age: 40
DRG: 894 | End: 2020-05-04
Attending: INTERNAL MEDICINE
Payer: SELF-PAY

## 2020-05-04 LAB
ALBUMIN SERPL-MCNC: 3.7 G/DL (ref 3.5–5)
ALBUMIN/GLOB SERPL: 1.4 {RATIO} (ref 1.1–2.2)
ALP SERPL-CCNC: 70 U/L (ref 45–117)
ALT SERPL-CCNC: 36 U/L (ref 12–78)
ANION GAP SERPL CALC-SCNC: 9 MMOL/L (ref 5–15)
AST SERPL-CCNC: 52 U/L (ref 15–37)
ATRIAL RATE: 124 BPM
BASOPHILS # BLD: 0 K/UL (ref 0–0.1)
BASOPHILS NFR BLD: 0 % (ref 0–1)
BILIRUB SERPL-MCNC: 1.1 MG/DL (ref 0.2–1)
BUN SERPL-MCNC: 6 MG/DL (ref 6–20)
BUN/CREAT SERPL: 6 (ref 12–20)
CALCIUM SERPL-MCNC: 8.5 MG/DL (ref 8.5–10.1)
CALCULATED P AXIS, ECG09: 42 DEGREES
CALCULATED R AXIS, ECG10: 47 DEGREES
CALCULATED T AXIS, ECG11: 41 DEGREES
CHLORIDE SERPL-SCNC: 106 MMOL/L (ref 97–108)
CHOLEST SERPL-MCNC: 108 MG/DL
CO2 SERPL-SCNC: 24 MMOL/L (ref 21–32)
CREAT SERPL-MCNC: 0.96 MG/DL (ref 0.7–1.3)
D DIMER PPP FEU-MCNC: <0.19 MG/L FEU (ref 0–0.65)
DIAGNOSIS, 93000: NORMAL
DIFFERENTIAL METHOD BLD: NORMAL
EOSINOPHIL # BLD: 0 K/UL (ref 0–0.4)
EOSINOPHIL NFR BLD: 0 % (ref 0–7)
ERYTHROCYTE [DISTWIDTH] IN BLOOD BY AUTOMATED COUNT: 13.3 % (ref 11.5–14.5)
EST. AVERAGE GLUCOSE BLD GHB EST-MCNC: 91 MG/DL
GLOBULIN SER CALC-MCNC: 2.7 G/DL (ref 2–4)
GLUCOSE SERPL-MCNC: 97 MG/DL (ref 65–100)
HBA1C MFR BLD: 4.8 % (ref 4–5.6)
HCT VFR BLD AUTO: 40.5 % (ref 36.6–50.3)
HDLC SERPL-MCNC: 36 MG/DL
HDLC SERPL: 3 {RATIO} (ref 0–5)
HGB BLD-MCNC: 13.8 G/DL (ref 12.1–17)
IMM GRANULOCYTES # BLD AUTO: 0 K/UL (ref 0–0.04)
IMM GRANULOCYTES NFR BLD AUTO: 0 % (ref 0–0.5)
LDLC SERPL CALC-MCNC: 27 MG/DL (ref 0–100)
LIPID PROFILE,FLP: ABNORMAL
LYMPHOCYTES # BLD: 2.4 K/UL (ref 0.8–3.5)
LYMPHOCYTES NFR BLD: 24 % (ref 12–49)
MAGNESIUM SERPL-MCNC: 1.5 MG/DL (ref 1.6–2.4)
MCH RBC QN AUTO: 31.6 PG (ref 26–34)
MCHC RBC AUTO-ENTMCNC: 34.1 G/DL (ref 30–36.5)
MCV RBC AUTO: 92.7 FL (ref 80–99)
MONOCYTES # BLD: 1 K/UL (ref 0–1)
MONOCYTES NFR BLD: 10 % (ref 5–13)
NEUTS SEG # BLD: 6.4 K/UL (ref 1.8–8)
NEUTS SEG NFR BLD: 66 % (ref 32–75)
NRBC # BLD: 0 K/UL (ref 0–0.01)
NRBC BLD-RTO: 0 PER 100 WBC
P-R INTERVAL, ECG05: 164 MS
PHOSPHATE SERPL-MCNC: 2.2 MG/DL (ref 2.6–4.7)
PLATELET # BLD AUTO: 293 K/UL (ref 150–400)
PMV BLD AUTO: 9.9 FL (ref 8.9–12.9)
POTASSIUM SERPL-SCNC: 4.1 MMOL/L (ref 3.5–5.1)
PROT SERPL-MCNC: 6.4 G/DL (ref 6.4–8.2)
Q-T INTERVAL, ECG07: 308 MS
QRS DURATION, ECG06: 76 MS
QTC CALCULATION (BEZET), ECG08: 442 MS
RBC # BLD AUTO: 4.37 M/UL (ref 4.1–5.7)
SODIUM SERPL-SCNC: 139 MMOL/L (ref 136–145)
TRIGL SERPL-MCNC: 225 MG/DL (ref ?–150)
TROPONIN I SERPL-MCNC: <0.05 NG/ML
TSH SERPL DL<=0.05 MIU/L-ACNC: 3.67 UIU/ML (ref 0.36–3.74)
VENTRICULAR RATE, ECG03: 124 BPM
VLDLC SERPL CALC-MCNC: 45 MG/DL
WBC # BLD AUTO: 10 K/UL (ref 4.1–11.1)

## 2020-05-04 PROCEDURE — 85025 COMPLETE CBC W/AUTO DIFF WBC: CPT

## 2020-05-04 PROCEDURE — 74011000250 HC RX REV CODE- 250: Performed by: INTERNAL MEDICINE

## 2020-05-04 PROCEDURE — 65660000001 HC RM ICU INTERMED STEPDOWN

## 2020-05-04 PROCEDURE — 85379 FIBRIN DEGRADATION QUANT: CPT

## 2020-05-04 PROCEDURE — 74011250636 HC RX REV CODE- 250/636: Performed by: HOSPITALIST

## 2020-05-04 PROCEDURE — 74011250637 HC RX REV CODE- 250/637: Performed by: HOSPITALIST

## 2020-05-04 PROCEDURE — 84443 ASSAY THYROID STIM HORMONE: CPT

## 2020-05-04 PROCEDURE — 74011250637 HC RX REV CODE- 250/637: Performed by: INTERNAL MEDICINE

## 2020-05-04 PROCEDURE — 80061 LIPID PANEL: CPT

## 2020-05-04 PROCEDURE — 74011250636 HC RX REV CODE- 250/636: Performed by: NURSE PRACTITIONER

## 2020-05-04 PROCEDURE — 74011250636 HC RX REV CODE- 250/636: Performed by: INTERNAL MEDICINE

## 2020-05-04 PROCEDURE — 36415 COLL VENOUS BLD VENIPUNCTURE: CPT

## 2020-05-04 PROCEDURE — 84484 ASSAY OF TROPONIN QUANT: CPT

## 2020-05-04 PROCEDURE — 84100 ASSAY OF PHOSPHORUS: CPT

## 2020-05-04 PROCEDURE — 80053 COMPREHEN METABOLIC PANEL: CPT

## 2020-05-04 PROCEDURE — 71045 X-RAY EXAM CHEST 1 VIEW: CPT

## 2020-05-04 PROCEDURE — 83735 ASSAY OF MAGNESIUM: CPT

## 2020-05-04 PROCEDURE — 83036 HEMOGLOBIN GLYCOSYLATED A1C: CPT

## 2020-05-04 RX ORDER — MAGNESIUM SULFATE HEPTAHYDRATE 40 MG/ML
2 INJECTION, SOLUTION INTRAVENOUS ONCE
Status: COMPLETED | OUTPATIENT
Start: 2020-05-04 | End: 2020-05-04

## 2020-05-04 RX ORDER — HYDRALAZINE HYDROCHLORIDE 20 MG/ML
10 INJECTION INTRAMUSCULAR; INTRAVENOUS
Status: DISCONTINUED | OUTPATIENT
Start: 2020-05-04 | End: 2020-05-12 | Stop reason: HOSPADM

## 2020-05-04 RX ORDER — IBUPROFEN 400 MG/1
400 TABLET ORAL
Status: DISCONTINUED | OUTPATIENT
Start: 2020-05-04 | End: 2020-05-09

## 2020-05-04 RX ORDER — SODIUM,POTASSIUM PHOSPHATES 280-250MG
1 POWDER IN PACKET (EA) ORAL 4 TIMES DAILY
Status: DISPENSED | OUTPATIENT
Start: 2020-05-04 | End: 2020-05-08

## 2020-05-04 RX ORDER — DIAZEPAM 10 MG/2ML
10 INJECTION INTRAMUSCULAR
Status: DISCONTINUED | OUTPATIENT
Start: 2020-05-04 | End: 2020-05-05

## 2020-05-04 RX ORDER — DIAZEPAM 10 MG/2ML
20 INJECTION INTRAMUSCULAR
Status: DISCONTINUED | OUTPATIENT
Start: 2020-05-04 | End: 2020-05-05

## 2020-05-04 RX ADMIN — LORAZEPAM 4 MG: 2 INJECTION INTRAMUSCULAR; INTRAVENOUS at 11:10

## 2020-05-04 RX ADMIN — LORAZEPAM 4 MG: 2 INJECTION INTRAMUSCULAR; INTRAVENOUS at 21:16

## 2020-05-04 RX ADMIN — SODIUM CHLORIDE, SODIUM LACTATE, POTASSIUM CHLORIDE, AND CALCIUM CHLORIDE 125 ML/HR: 600; 310; 30; 20 INJECTION, SOLUTION INTRAVENOUS at 16:56

## 2020-05-04 RX ADMIN — Medication 10 ML: at 00:04

## 2020-05-04 RX ADMIN — LORAZEPAM 4 MG: 2 INJECTION INTRAMUSCULAR; INTRAVENOUS at 14:24

## 2020-05-04 RX ADMIN — LORAZEPAM 2 MG: 2 INJECTION INTRAMUSCULAR; INTRAVENOUS at 03:36

## 2020-05-04 RX ADMIN — LORAZEPAM 4 MG: 2 INJECTION INTRAMUSCULAR; INTRAVENOUS at 15:29

## 2020-05-04 RX ADMIN — MAGNESIUM SULFATE HEPTAHYDRATE 2 G: 40 INJECTION, SOLUTION INTRAVENOUS at 12:28

## 2020-05-04 RX ADMIN — HEPARIN SODIUM 5000 UNITS: 5000 INJECTION, SOLUTION INTRAVENOUS; SUBCUTANEOUS at 00:01

## 2020-05-04 RX ADMIN — LORAZEPAM 4 MG: 2 INJECTION INTRAMUSCULAR; INTRAVENOUS at 17:00

## 2020-05-04 RX ADMIN — HEPARIN SODIUM 5000 UNITS: 5000 INJECTION, SOLUTION INTRAVENOUS; SUBCUTANEOUS at 08:11

## 2020-05-04 RX ADMIN — LORAZEPAM 4 MG: 2 INJECTION INTRAMUSCULAR; INTRAVENOUS at 22:04

## 2020-05-04 RX ADMIN — LORAZEPAM 4 MG: 2 INJECTION INTRAMUSCULAR; INTRAVENOUS at 12:28

## 2020-05-04 RX ADMIN — LORAZEPAM 4 MG: 2 INJECTION INTRAMUSCULAR; INTRAVENOUS at 00:33

## 2020-05-04 RX ADMIN — ONDANSETRON 4 MG: 2 INJECTION INTRAMUSCULAR; INTRAVENOUS at 00:19

## 2020-05-04 RX ADMIN — Medication 10 ML: at 15:34

## 2020-05-04 RX ADMIN — POTASSIUM CHLORIDE 10 MEQ: 200 INJECTION, SOLUTION INTRAVENOUS at 00:38

## 2020-05-04 RX ADMIN — POTASSIUM & SODIUM PHOSPHATES POWDER PACK 280-160-250 MG 1 PACKET: 280-160-250 PACK at 14:24

## 2020-05-04 RX ADMIN — DIAZEPAM 20 MG: 5 INJECTION, SOLUTION INTRAMUSCULAR; INTRAVENOUS at 23:42

## 2020-05-04 RX ADMIN — LORAZEPAM 4 MG: 2 INJECTION INTRAMUSCULAR; INTRAVENOUS at 20:08

## 2020-05-04 RX ADMIN — HEPARIN SODIUM 5000 UNITS: 5000 INJECTION, SOLUTION INTRAVENOUS; SUBCUTANEOUS at 15:30

## 2020-05-04 RX ADMIN — LORAZEPAM 4 MG: 2 INJECTION INTRAMUSCULAR; INTRAVENOUS at 19:01

## 2020-05-04 RX ADMIN — LORAZEPAM 4 MG: 2 INJECTION INTRAMUSCULAR; INTRAVENOUS at 23:00

## 2020-05-04 RX ADMIN — Medication 10 ML: at 20:08

## 2020-05-04 RX ADMIN — LORAZEPAM 4 MG: 2 INJECTION INTRAMUSCULAR; INTRAVENOUS at 18:05

## 2020-05-04 RX ADMIN — LORAZEPAM 4 MG: 2 INJECTION INTRAMUSCULAR; INTRAVENOUS at 08:11

## 2020-05-04 RX ADMIN — POTASSIUM CHLORIDE 10 MEQ: 200 INJECTION, SOLUTION INTRAVENOUS at 01:58

## 2020-05-04 RX ADMIN — FOLIC ACID: 5 INJECTION, SOLUTION INTRAMUSCULAR; INTRAVENOUS; SUBCUTANEOUS at 00:35

## 2020-05-04 RX ADMIN — SODIUM CHLORIDE, SODIUM LACTATE, POTASSIUM CHLORIDE, AND CALCIUM CHLORIDE 125 ML/HR: 600; 310; 30; 20 INJECTION, SOLUTION INTRAVENOUS at 09:00

## 2020-05-04 RX ADMIN — POTASSIUM & SODIUM PHOSPHATES POWDER PACK 280-160-250 MG 1 PACKET: 280-160-250 PACK at 21:15

## 2020-05-04 RX ADMIN — HEPARIN SODIUM 5000 UNITS: 5000 INJECTION, SOLUTION INTRAVENOUS; SUBCUTANEOUS at 23:16

## 2020-05-04 RX ADMIN — POTASSIUM & SODIUM PHOSPHATES POWDER PACK 280-160-250 MG 1 PACKET: 280-160-250 PACK at 17:00

## 2020-05-04 NOTE — PROGRESS NOTES
[facility] Adult  Hospitalist Group                                                                                          Hospitalist Progress Note  David Walker MD  Answering service: [phone] from in house phone        Date of Service:  2020  NAME:  Aileen Gómez. :  1980  MRN:  [id number]      Admission Summary: This is a 68-year-old man with a past medical history significant for hypertension, chronic back pain, was in his usual state of health until the day of presentation at the emergency room when the patient stated that he is withdrawing from alcohol. The patient has history of alcohol abuse. He has been to alcohol anonymous for treatment. The patient drinks about a liter of bourbon daily. His last consumption of alcohol was yesterday afternoon. The patient has had withdrawal from alcohol in the past including seizure. When the patient arrived at the emergency room, the patient was found to be very tremulous and anxious. The patient was subsequently referred to the hospitalist service for evaluation for admission. No record of prior admission to this hospital.  The patient denies fever, rigors, or chills. The patient denies sick contacts. The patient also denies contact with anybody with COVID-19 virus infection. Interval history / Subjective:   F/u for etoh withdrawal  Pt is sedated required 10 mg of oral ativan did not require precedex     Assessment & Plan:     1. Alcohol withdrawal syndrome. - cont with CIWA protocol. The patient will require psychiatric consult once the patient is medically stable. - The patient will also be placed on banana bag. We will monitor the patient closely. 2.  Hypokalemia. We will replace potassium and repeat potassium level and magnesium     3. Hyperglycemia. resolved     4. Sinus tachycardia. Resolved cont IVF    5. Hypertension.   The patient is not taking any medication at home for his hypertension.   - start PRN meds     6. Tobacco abuse. The patient advised to quit smoking. We will place the patient on Nicoderm patch. 7.  Dyslipidemia. We will check lipid profile. 8.  Chronic back pain. We will carry out pain control.     Code status:Full  DVT prophylaxis: Yudith Tyson discussed with: Patient/Family and Nurse  Anticipated Disposition: Home w/Family  Anticipated Discharge: 24 hours to 48 hours     Hospital Problems  Date Reviewed: 5/3/2020          Codes Class Noted POA    * (Principal) Alcohol withdrawal syndrome ([facility] ICD-10-CM: C22.301  ICD-9-CM: 291.81  5/3/2020 Yes                Review of Systems:   Review of systems not obtained due to patient factors. Vital Signs:    Last 24hrs VS reviewed since prior progress note. Most recent are:  Visit Vitals  /79   Pulse 98   Temp 98.2 °F (36.8 °C)   Resp (!) 33   Ht 6' 1\" (1.854 m)   Wt 92.9 kg (204 lb 12.9 oz)   SpO2 91%   BMI 27.02 kg/m²         Intake/Output Summary (Last 24 hours) at 5/4/2020 1112  Last data filed at 5/4/2020 0900  Gross per 24 hour   Intake 2285.42 ml   Output 200 ml   Net 2085.42 ml        Physical Examination:             Constitutional:  No acute distress, cooperative, pleasant    ENT:  Oral mucosa moist, oropharynx benign. Resp:  CTA bilaterally. No wheezing/rhonchi/rales. No accessory muscle use   CV:  Regular rhythm, normal rate, no murmurs, gallops, rubs    GI:  Soft, non distended, non tender. normoactive bowel sounds, no hepatosplenomegaly     Musculoskeletal:  No edema, warm, 2+ pulses throughout    Neurologic:  Moves all extremities. AAOx3, CN II-XII reviewed     Psych:  Good insight, Not anxious nor agitated.        Data Review:    I personally reviewed  Image and labs      Labs:     Recent Labs     05/04/20  0334 05/03/20  1719   WBC 10.0 7.0   HGB 13.8 14.4   HCT 40.5 41.7    328     Recent Labs     05/04/20  0334 05/03/20  1719    137   K 4.1 3.0*    98   CO2 24 24 BUN 6 9   CREA 0.96 1.13   GLU 97 145*   CA 8.5 8.5   MG 1.5* 1.6   PHOS 2.2*  --      Recent Labs     05/04/20  0334 05/03/20 1719   SGOT 52* 34   ALT 36 34   AP 70 68   TBILI 1.1* 0.6   TP 6.4 7.1   ALB 3.7 4.0   GLOB 2.7 3.1     No results for input(s): INR, PTP, APTT, INREXT in the last 72 hours. No results for input(s): FE, TIBC, PSAT, FERR in the last 72 hours. No results found for: FOL, RBCF   No results for input(s): PH, PCO2, PO2 in the last 72 hours.   Recent Labs     05/04/20  0328 05/03/20 1719   TROIQ <0.05 <0.05     Lab Results   Component Value Date/Time    Cholesterol, total 108 05/04/2020 03:34 AM    HDL Cholesterol 36 05/04/2020 03:34 AM    LDL, calculated 27 05/04/2020 03:34 AM    Triglyceride 225 (H) 05/04/2020 03:34 AM    CHOL/HDL Ratio 3.0 05/04/2020 03:34 AM     No results found for: [facility]  Lab Results   Component Value Date/Time    Color YELLOW/STRAW 05/03/2020 05:58 PM    Appearance CLEAR 05/03/2020 05:58 PM    Specific gravity 1.025 05/03/2020 05:58 PM    Specific gravity 1.010 08/31/2017 12:33 AM    pH (UA) 5.5 05/03/2020 05:58 PM    Protein TRACE (A) 05/03/2020 05:58 PM    Glucose Negative 05/03/2020 05:58 PM    Ketone Negative 05/03/2020 05:58 PM    Bilirubin Negative 05/03/2020 05:58 PM    Urobilinogen 0.2 05/03/2020 05:58 PM    Nitrites Negative 05/03/2020 05:58 PM    Leukocyte Esterase Negative 05/03/2020 05:58 PM    Epithelial cells FEW 05/03/2020 05:58 PM    Bacteria Negative 05/03/2020 05:58 PM    WBC 0-4 05/03/2020 05:58 PM    RBC 0-5 05/03/2020 05:58 PM         Medications Reviewed:     Current Facility-Administered Medications   Medication Dose Route Frequency    hydrALAZINE (APRESOLINE) 20 mg/mL injection 10 mg  10 mg IntraVENous Q6H PRN    magnesium sulfate 2 g/50 ml IVPB (premix or compounded)  2 g IntraVENous ONCE    potassium, sodium phosphates (NEUTRA-PHOS) packet 1 Packet  1 Packet Oral QID    sodium chloride (NS) flush 5-40 mL  5-40 mL IntraVENous Q8H    sodium chloride (NS) flush 5-40 mL  5-40 mL IntraVENous PRN    acetaminophen (TYLENOL) tablet 650 mg  650 mg Oral Q4H PRN    ondansetron (ZOFRAN) injection 4 mg  4 mg IntraVENous Q4H PRN    bisacodyL (DULCOLAX) tablet 5 mg  5 mg Oral DAILY PRN    nicotine (NICODERM CQ) 21 mg/24 hr patch 1 Patch  1 Patch TransDERmal Q24H    heparin (porcine) injection 5,000 Units  5,000 Units SubCUTAneous Q8H    lactated Ringers 6,097 mL with folic acid 1 mg, thiamine 100 mg, mvi, adult no. 4 with vit K 10 mL infusion   IntraVENous DAILY    lactated Ringers infusion  125 mL/hr IntraVENous CONTINUOUS    LORazepam (ATIVAN) injection 2 mg  2 mg IntraVENous Q1H PRN    LORazepam (ATIVAN) injection 4 mg  4 mg IntraVENous Q1H PRN     ______________________________________________________________________  EXPECTED LENGTH OF STAY: 3d 9h  ACTUAL LENGTH OF STAY:          1                 Ashlyn Cohen MD

## 2020-05-04 NOTE — PROGRESS NOTES
0730 Received verbal bedside report from [facility], [address] [place] [address]. Assumed care of the pt.      [id number] Pt's brother, Will on the phone. Pt states he can have information. Pt code provided and update given. 4627 Pt's diastolic BP has been elevated in the 110's. Janel Strong, hospitalist NP made aware. Will continue to monitor. 1930 Bedside and Verbal shift change report given to FORREST Goncalves (oncoming nurse) by Mandeep Emmanuel (offgoing nurse).  Report included the following information SBAR, Kardex, Procedure Summary, Intake/Output, MAR, Recent Results and Cardiac Rhythm ST.

## 2020-05-04 NOTE — PROGRESS NOTES
Spiritual Care Assessment/Progress Note  ST. [address]      NAME: Marielle Zhang. MRN: [id number]  AGE: 37 y.o.  SEX: male  Baptism Affiliation: No Sikh   Language: English     5/4/2020           Spiritual Assessment begun in [facility] CORONARY CARE through conversation with:         [x]Patient        [] Family    [] Friend(s)        Reason for Consult: Initial/Spiritual assessment, critical care     Spiritual beliefs: (Please include comment if needed)     [] Identifies with a ben tradition:         [] Supported by a ben community:            [x] Claims no spiritual orientation:           [] Seeking spiritual identity:                [] Adheres to an individual form of spirituality:           [] Not able to assess:                           Identified resources for coping:      [] Prayer                               [] Music                  [] Guided Imagery     [] Family/friends                 [] Pet visits     [] Devotional reading                         [x] Unknown     [] Other:                                              Interventions offered during this visit: (See comments for more details)    Patient Interventions: Affirmation of emotions/emotional suffering, Catharsis/review of pertinent events in supportive environment, Initial/Spiritual assessment, Critical care, Prayer (assurance of)           Plan of Care:     [x] Support spiritual and/or cultural needs    [] Support AMD and/or advance care planning process      [] Support grieving process   [] Coordinate Rites and/or Rituals    [] Coordination with community clergy   [] No spiritual needs identified at this time   [] Detailed Plan of Care below (See Comments)  [] Make referral to Music Therapy  [] Make referral to Pet Therapy     [] Make referral to Addiction services  [] Make referral to [facility]  [] Make referral to Spiritual Care Partner  [] No future visits requested        [x] Follow up visits as needed     Comments: Visited Mr Britt Salomon in [facility] for initial spiritual assessment. Mr Britt Salomon was somewhat restless; he was very slow to answer questions and answered in one-word responses. Mr Britt Salomon stated that he felt terrible; his nurse was aware. He denied having any concerns to share with . He said he was not associated with any particular Advent/ben community. He accepted 's offer to keep him in prayer. Assured him of ongoing  availability for support. : Rev. Sienna Mijares.  Shanika Rodriguez; [facility], to contact [address] [facility] call: [phone]

## 2020-05-04 NOTE — CARDIO/PULMONARY
Cardiac Rehab: Per EMR, patient is a current smoker.  Smoking Cessation Program information placed on the AVS.   
Racheal Donaldson RN

## 2020-05-04 NOTE — BSMART NOTE
Writer was asked to provide SA resources to pt. Upon writer's arrival pt was tremulous with anxious mood and congruent affect but alert and oriented X 4. No evidence of psychosis or escobar. Pt attributed his current presentation to alcohol withdrawal. He reported that he was 8 months sober from alcohol from relapsed 2 days ago. He states there was no particular stressor or precipitating factor regarding why he relapsed. Pt reports that he has experienced w/d sx after short periods of alcohol binges in the past. He cannot recall whether or not he has had DTs after these short binges. Pt not in need of resources; he reports that he already has a sponsor and attends [place] [id number] meetings regularly.

## 2020-05-04 NOTE — H&P
[address]  HISTORY AND PHYSICAL    Name:  Vania Marino  MR#:  [id number]  :  1980  ACCOUNT #:  [de-identified]  ADMIT DATE:  2020      The patient was seen, evaluated, and admitted by me on 2020. PRIMARY CARE PHYSICIAN:  None. SOURCE OF INFORMATION:  The patient. CHIEF COMPLAINT:  Withdrawing from alcohol. HISTORY OF PRESENT ILLNESS:  This is a 47-year-old man with a past medical history significant for hypertension, chronic back pain, was in his usual state of health until the day of presentation at the emergency room when the patient stated that he is withdrawing from alcohol. The patient has history of alcohol abuse. He has been to alcohol anonymous for treatment. The patient drinks about a liter of bourbon daily. His last consumption of alcohol was yesterday afternoon. The patient has had withdrawal from alcohol in the past including seizure. When the patient arrived at the emergency room, the patient was found to be very tremulous and anxious. The patient was subsequently referred to the hospitalist service for evaluation for admission. No record of prior admission to this hospital.  The patient denies fever, rigors, or chills. The patient denies sick contacts. The patient also denies contact with anybody with COVID-19 virus infection. PAST MEDICAL HISTORY:  Hypertension, dyslipidemia, chronic back pain, alcohol abuse. ALLERGIES:  NO KNOWN DRUG ALLERGIES. MEDICATIONS:  The patient denies taking any medication at home. FAMILY HISTORY:  This was reviewed. His mother has diabetes, hypertension, thyroid disease, heart disease, and osteoarthritis. His father has heart disease, hypertension, diabetes, and prostate cancer. PAST SURGICAL HISTORY:  This is significant for spinal fusion. SOCIAL HISTORY:  The patient smokes about a pack of cigarettes daily. The patient drinks about a liter of  bourbon daily.     REVIEW OF SYSTEMS:  HEAD, EYES, EARS, NOSE, AND THROAT:  No headache, no dizziness, no blurring of vision, no photophobia. RESPIRATORY SYSTEM:  No cough, no shortness of breath, no hemoptysis. CARDIOVASCULAR SYSTEM. No chest pain, no orthopnea, no palpitation. GASTROINTESTINAL SYSTEM:  No nausea or vomiting. No diarrhea. No constipation. GENITOURINARY SYSTEM:  No dysuria, no urgency, and no frequency. All other systems are reviewed and they are negative. PHYSICAL EXAMINATION:  GENERAL APPEARANCE:  The patient appeared ill, in moderate distress. VITAL SIGNS:  On arrival at the emergency room, temperature 98.4, pulse 132, respiratory rate 20, blood pressure 164/94, oxygen saturation 97% on room air. HEENT:  Head:  Normocephalic, atraumatic. Eyes:  Normal eye movements. No redness, no drainage, no discharge. Ears:  Normal external ears with no evidence of drainage. Nose:  No deformity and no drainage. Mouth and Throat:  No visible oral lesion. Dry oral mucosa. NECK:  Neck is supple. No JVD, no thyromegaly. CHEST:  Clear breath sounds. No wheezing, no crackles. HEART:  Normal S1 and S2.  Regular. No clinically appreciable murmur. ABDOMEN:  Soft. Nontender. Normal bowel sounds. CNS:  Alert and oriented x3. No gross focal neurological deficit. Tremors noted. EXTREMITIES:  No edema, pulses 2+ bilaterally. MUSCULOSKELETAL SYSTEM:  No evidence of joint deformity or swelling. SKIN:  No active skin lesion seen in the exposed part of the body. PSYCHIATRIC:  Normal mood, but flat affect. LYMPHATIC SYSTEM:  No cervical lymphadenopathy. DIAGNOSTIC DATA:  EKG shows sinus tachycardia and nonspecific T-waves abnormalities. LABORATORY DATA:  Hematology:  WBC 7.0, hemoglobin 14.4, hematocrit 41.7, platelets 483.   Chemistry:  Sodium 137, potassium 3.0, chloride 98, CO2 of 24, glucose 145, BUN 19, creatinine 1.13, calcium 8.5, total bilirubin 0.6, ALT 34, AST 34, alkaline phosphatase 68, total protein at 7.1, albumin level 4.0, globulin at 3.1. Acetaminophen level less than 2. Salicylate level less than 1.7. Serum alcohol level 117. Urinalysis: This is significant for negative nitrite, negative leukocyte esterase, negative bacteria. Cardiac profile:  Troponin less than 0.05. Magnesium level 1.6. Urine drug screen negative. ASSESSMENT:  1. Alcohol withdrawal syndrome. 2.  Hypokalemia. 3.  Hyperglycemia. 4.  Sinus tachycardia. 5.  Hypertension. 6.  Tobacco abuse. 7.  Dyslipidemia. 8.  Chronic back pain. PLAN:  1. Alcohol withdrawal syndrome. We will admit the patient for further evaluation and treatment. We will start the patient on CIWA protocol. The patient will require psychiatric consult once the patient is medically stable. The patient will also be placed on banana bag. We will monitor the patient closely. 2.  Hypokalemia. We will replace potassium and repeat potassium level and magnesium level is within normal limits. 3.  Hyperglycemia. We will check hemoglobin A1c level. The patient has no history of diabetes. 4.  Sinus tachycardia. This is most likely due to the patient's alcohol withdrawal syndrome. We will check cardiac markers. We will check TSH level. We will also check D-dimer to evaluate the patient for thromboembolism. The patient will be started on fluid therapy. 5.  Hypertension. The patient is not taking any medication at home for his hypertension. We will place the patient on hydralazine as needed for better blood pressure control. The patient will most likely require antihypertensive medication at the time of discharge home. 6.  Tobacco abuse. The patient advised to quit smoking. We will place the patient on Nicoderm patch. 7.  Dyslipidemia. We will check lipid profile. 8.  Chronic back pain. We will carry out pain control. OTHER ISSUES:  Code status: The patient is a full code. We will place the patient on heparin for DVT prophylaxis.     FUNCTIONAL STATUS PRIOR TO ADMISSION:  The patient came from home. The patient is ambulatory with no assistant or device. COVID PRECAUTION:  The patient was wearing a mask. I was wearing cap, mask, and gloves for this patient's encounter.         Donna Garcia MD      [id number]  D:  05/04/2020 5:23  T:  05/04/2020 6:35  JOB #:  [id number]

## 2020-05-04 NOTE — PROGRESS NOTES
Problem: Falls - Risk of  Goal: *Absence of Falls  Description: Document Starleen Freeze Fall Risk and appropriate interventions in the flowsheet. Outcome: Progressing Towards Goal  Note: Fall Risk Interventions:  Mobility Interventions: Bed/chair exit alarm, Patient to call before getting OOB, Strengthening exercises (ROM-active/passive)    Mentation Interventions: Door open when patient unattended, More frequent rounding, Reorient patient    Medication Interventions: Evaluate medications/consider consulting pharmacy, Teach patient to arise slowly, Bed/chair exit alarm    Elimination Interventions: Call light in reach, Toileting schedule/hourly rounds              Problem: Patient Education: Go to Patient Education Activity  Goal: Patient/Family Education  Outcome: Progressing Towards Goal     Problem: Pressure Injury - Risk of  Goal: *Prevention of pressure injury  Description: Document David Scale and appropriate interventions in the flowsheet.   Outcome: Progressing Towards Goal  Note: Pressure Injury Interventions:                                            Problem: Patient Education: Go to Patient Education Activity  Goal: Patient/Family Education  Outcome: Progressing Towards Goal     Problem: Pain  Goal: *Control of Pain  Outcome: Progressing Towards Goal  Goal: *PALLIATIVE CARE:  Alleviation of Pain  Outcome: Progressing Towards Goal     Problem: Patient Education: Go to Patient Education Activity  Goal: Patient/Family Education  Outcome: Progressing Towards Goal     Problem: Alcohol Withdrawal  Goal: *STG: Participates in treatment plan  Outcome: Progressing Towards Goal  Goal: *STG: Remains safe in hospital  Outcome: Progressing Towards Goal  Goal: *STG: Seeks staff when symptoms of withdrawal increase  Outcome: Progressing Towards Goal  Goal: *STG: Complies with medication therapy  Outcome: Progressing Towards Goal  Goal: *STG: Attends activities and groups  Outcome: Progressing Towards Goal  Goal: *STG: Will identify negative impact of chemical dependency including the use of tobacco, alcohol, and other substances  Outcome: Progressing Towards Goal  Goal: *STG: Verbalizes abstinence as an achievable goal  Outcome: Progressing Towards Goal  Goal: *STG: Agrees to participate in outpatient after care program to support ongoing mental health  Outcome: Progressing Towards Goal  Goal: *STG: Able to indentify relapse triggers including interpersonal/social and familial factors  Outcome: Progressing Towards Goal  Goal: *STG: Identify lifestyle changes to support long term sobriety such as vocation, employment, education, and legal issues  Outcome: Progressing Towards Goal  Goal: *STG: Maintains appropriate nutrition and hydration  Outcome: Progressing Towards Goal  Goal: *STG: Vital signs within defined limits  Outcome: Progressing Towards Goal  Goal: *STG/LTG: Relapse prevention plan in place to include housing/aftercare, leisure activities, and spirituality  Outcome: Progressing Towards Goal  Goal: Interventions  Outcome: Progressing Towards Goal     Problem: Patient Education: Go to Patient Education Activity  Goal: Patient/Family Education  Outcome: Progressing Towards Goal     Problem: Delirium Treatment  Goal: *Level of consciousness restored to baseline  Outcome: Progressing Towards Goal  Goal: *Level of environmental perceptions restored to baseline  Outcome: Progressing Towards Goal  Goal: *Sensory perception restored to baseline  Outcome: Progressing Towards Goal  Goal: *Emotional stability restored to baseline  Outcome: Progressing Towards Goal  Goal: *Functional assessment restored to baseline  Outcome: Progressing Towards Goal  Goal: *Absence of falls  Outcome: Progressing Towards Goal  Goal: *Will remain free of delirium, CAM Score negative  Outcome: Progressing Towards Goal  Goal: *Cognitive status will be restored to baseline  Outcome: Progressing Towards Goal  Goal: Interventions  Outcome: Progressing Towards Goal     Problem: Patient Education: Go to Patient Education Activity  Goal: Patient/Family Education  Outcome: Progressing Towards Goal

## 2020-05-04 NOTE — ROUTINE PROCESS
TRANSFER - OUT REPORT: 
 
Verbal report given to FORREST Summers(name) on Faina Vaughn.  being transferred to CCU(unit) for routine progression of care Report consisted of patients Situation, Background, Assessment and  
Recommendations(SBAR). Information from the following report(s) SBAR, ED Summary, MAR, Recent Results and Cardiac Rhythm SR was reviewed with the receiving nurse. Lines:  
Peripheral IV 05/03/20 Right Forearm (Active) Site Assessment Clean, dry, & intact 5/3/2020  5:48 PM  
Phlebitis Assessment 0 5/3/2020  5:48 PM  
Infiltration Assessment 0 5/3/2020  5:48 PM  
Dressing Status Clean, dry, & intact 5/3/2020  5:48 PM  
Dressing Type Transparent 5/3/2020  5:48 PM  
  
 
Opportunity for questions and clarification was provided. Patient transported with: 
 Monitor Registered Nurse Kirstie Mace RN

## 2020-05-05 LAB
ANION GAP SERPL CALC-SCNC: 7 MMOL/L (ref 5–15)
BUN SERPL-MCNC: 5 MG/DL (ref 6–20)
BUN/CREAT SERPL: 8 (ref 12–20)
CALCIUM SERPL-MCNC: 8.1 MG/DL (ref 8.5–10.1)
CHLORIDE SERPL-SCNC: 107 MMOL/L (ref 97–108)
CO2 SERPL-SCNC: 23 MMOL/L (ref 21–32)
CREAT SERPL-MCNC: 0.65 MG/DL (ref 0.7–1.3)
ERYTHROCYTE [DISTWIDTH] IN BLOOD BY AUTOMATED COUNT: 13.2 % (ref 11.5–14.5)
GLUCOSE SERPL-MCNC: 103 MG/DL (ref 65–100)
HCT VFR BLD AUTO: 37.6 % (ref 36.6–50.3)
HGB BLD-MCNC: 12.9 G/DL (ref 12.1–17)
MAGNESIUM SERPL-MCNC: 2.1 MG/DL (ref 1.6–2.4)
MCH RBC QN AUTO: 31.5 PG (ref 26–34)
MCHC RBC AUTO-ENTMCNC: 34.3 G/DL (ref 30–36.5)
MCV RBC AUTO: 91.7 FL (ref 80–99)
NRBC # BLD: 0 K/UL (ref 0–0.01)
NRBC BLD-RTO: 0 PER 100 WBC
PHOSPHATE SERPL-MCNC: 2.5 MG/DL (ref 2.6–4.7)
PLATELET # BLD AUTO: 225 K/UL (ref 150–400)
PMV BLD AUTO: 10.9 FL (ref 8.9–12.9)
POTASSIUM SERPL-SCNC: 3.7 MMOL/L (ref 3.5–5.1)
RBC # BLD AUTO: 4.1 M/UL (ref 4.1–5.7)
SODIUM SERPL-SCNC: 137 MMOL/L (ref 136–145)
WBC # BLD AUTO: 6.7 K/UL (ref 4.1–11.1)

## 2020-05-05 PROCEDURE — 65620000000 HC RM CCU GENERAL

## 2020-05-05 PROCEDURE — 74011000250 HC RX REV CODE- 250: Performed by: INTERNAL MEDICINE

## 2020-05-05 PROCEDURE — 74011250636 HC RX REV CODE- 250/636: Performed by: EMERGENCY MEDICINE

## 2020-05-05 PROCEDURE — 84100 ASSAY OF PHOSPHORUS: CPT

## 2020-05-05 PROCEDURE — 74011000250 HC RX REV CODE- 250: Performed by: NURSE PRACTITIONER

## 2020-05-05 PROCEDURE — 85027 COMPLETE CBC AUTOMATED: CPT

## 2020-05-05 PROCEDURE — 74011000258 HC RX REV CODE- 258: Performed by: INTERNAL MEDICINE

## 2020-05-05 PROCEDURE — 74011250636 HC RX REV CODE- 250/636: Performed by: INTERNAL MEDICINE

## 2020-05-05 PROCEDURE — 74011000258 HC RX REV CODE- 258: Performed by: NURSE PRACTITIONER

## 2020-05-05 PROCEDURE — 74011250636 HC RX REV CODE- 250/636: Performed by: NURSE PRACTITIONER

## 2020-05-05 PROCEDURE — 80048 BASIC METABOLIC PNL TOTAL CA: CPT

## 2020-05-05 PROCEDURE — 74011000258 HC RX REV CODE- 258: Performed by: EMERGENCY MEDICINE

## 2020-05-05 PROCEDURE — 36415 COLL VENOUS BLD VENIPUNCTURE: CPT

## 2020-05-05 PROCEDURE — 74011250636 HC RX REV CODE- 250/636

## 2020-05-05 PROCEDURE — 83735 ASSAY OF MAGNESIUM: CPT

## 2020-05-05 RX ORDER — PHENOBARBITAL SODIUM 130 MG/ML
200 INJECTION INTRAMUSCULAR ONCE
Status: COMPLETED | OUTPATIENT
Start: 2020-05-05 | End: 2020-05-05

## 2020-05-05 RX ORDER — HALOPERIDOL 5 MG/ML
INJECTION INTRAMUSCULAR
Status: COMPLETED
Start: 2020-05-05 | End: 2020-05-05

## 2020-05-05 RX ORDER — HALOPERIDOL 5 MG/ML
5 INJECTION INTRAMUSCULAR
Status: DISCONTINUED | OUTPATIENT
Start: 2020-05-05 | End: 2020-05-11

## 2020-05-05 RX ORDER — ENOXAPARIN SODIUM 100 MG/ML
40 INJECTION SUBCUTANEOUS EVERY 24 HOURS
Status: DISCONTINUED | OUTPATIENT
Start: 2020-05-05 | End: 2020-05-12 | Stop reason: HOSPADM

## 2020-05-05 RX ORDER — LORAZEPAM 2 MG/ML
2 INJECTION INTRAMUSCULAR
Status: DISCONTINUED | OUTPATIENT
Start: 2020-05-05 | End: 2020-05-09

## 2020-05-05 RX ORDER — FOLIC ACID 5 MG/ML
1 INJECTION, SOLUTION INTRAMUSCULAR; INTRAVENOUS; SUBCUTANEOUS DAILY
Status: DISCONTINUED | OUTPATIENT
Start: 2020-05-05 | End: 2020-05-05 | Stop reason: CLARIF

## 2020-05-05 RX ORDER — PHENOBARBITAL SODIUM 130 MG/ML
130 INJECTION INTRAMUSCULAR EVERY 8 HOURS
Status: DISCONTINUED | OUTPATIENT
Start: 2020-05-05 | End: 2020-05-05

## 2020-05-05 RX ORDER — HALOPERIDOL 5 MG/ML
5 INJECTION INTRAMUSCULAR ONCE
Status: COMPLETED | OUTPATIENT
Start: 2020-05-05 | End: 2020-05-05

## 2020-05-05 RX ORDER — POTASSIUM CHLORIDE 7.45 MG/ML
10 INJECTION INTRAVENOUS
Status: COMPLETED | OUTPATIENT
Start: 2020-05-05 | End: 2020-05-05

## 2020-05-05 RX ORDER — GABAPENTIN 250 MG/5ML
300 SOLUTION ORAL EVERY 8 HOURS
Status: DISCONTINUED | OUTPATIENT
Start: 2020-05-05 | End: 2020-05-06

## 2020-05-05 RX ORDER — PHENOBARBITAL SODIUM 130 MG/ML
130 INJECTION INTRAMUSCULAR EVERY 4 HOURS
Status: DISCONTINUED | OUTPATIENT
Start: 2020-05-05 | End: 2020-05-06

## 2020-05-05 RX ADMIN — DEXMEDETOMIDINE HYDROCHLORIDE 0.5 MCG/KG/HR: 100 INJECTION, SOLUTION, CONCENTRATE INTRAVENOUS at 05:04

## 2020-05-05 RX ADMIN — POTASSIUM CHLORIDE 10 MEQ: 7.46 INJECTION, SOLUTION INTRAVENOUS at 13:49

## 2020-05-05 RX ADMIN — PHENOBARBITAL SODIUM 130 MG: 130 INJECTION INTRAMUSCULAR; INTRAVENOUS at 16:51

## 2020-05-05 RX ADMIN — DIAZEPAM 20 MG: 5 INJECTION, SOLUTION INTRAMUSCULAR; INTRAVENOUS at 00:18

## 2020-05-05 RX ADMIN — POTASSIUM CHLORIDE 10 MEQ: 7.46 INJECTION, SOLUTION INTRAVENOUS at 12:28

## 2020-05-05 RX ADMIN — DEXMEDETOMIDINE HYDROCHLORIDE 0.4 MCG/KG/HR: 100 INJECTION, SOLUTION, CONCENTRATE INTRAVENOUS at 00:47

## 2020-05-05 RX ADMIN — POTASSIUM CHLORIDE 10 MEQ: 7.46 INJECTION, SOLUTION INTRAVENOUS at 16:00

## 2020-05-05 RX ADMIN — POTASSIUM CHLORIDE 10 MEQ: 7.46 INJECTION, SOLUTION INTRAVENOUS at 14:53

## 2020-05-05 RX ADMIN — ENOXAPARIN SODIUM 40 MG: 40 INJECTION SUBCUTANEOUS at 08:05

## 2020-05-05 RX ADMIN — HALOPERIDOL LACTATE 5 MG: 5 INJECTION, SOLUTION INTRAMUSCULAR at 01:19

## 2020-05-05 RX ADMIN — PHENOBARBITAL SODIUM 130 MG: 130 INJECTION INTRAMUSCULAR; INTRAVENOUS at 12:27

## 2020-05-05 RX ADMIN — PHENOBARBITAL SODIUM 130 MG: 130 INJECTION INTRAMUSCULAR; INTRAVENOUS at 23:37

## 2020-05-05 RX ADMIN — DEXMEDETOMIDINE HYDROCHLORIDE 0.3 MCG/KG/HR: 100 INJECTION, SOLUTION, CONCENTRATE INTRAVENOUS at 14:54

## 2020-05-05 RX ADMIN — FOLIC ACID: 5 INJECTION, SOLUTION INTRAMUSCULAR; INTRAVENOUS; SUBCUTANEOUS at 09:17

## 2020-05-05 RX ADMIN — PHENOBARBITAL SODIUM 130 MG: 130 INJECTION INTRAMUSCULAR; INTRAVENOUS at 08:05

## 2020-05-05 RX ADMIN — Medication 10 ML: at 06:10

## 2020-05-05 RX ADMIN — DEXMEDETOMIDINE HYDROCHLORIDE 0.6 MCG/KG/HR: 100 INJECTION, SOLUTION, CONCENTRATE INTRAVENOUS at 04:22

## 2020-05-05 RX ADMIN — HALOPERIDOL 5 MG: 5 INJECTION INTRAMUSCULAR at 01:19

## 2020-05-05 RX ADMIN — THIAMINE HYDROCHLORIDE 100 MG: 100 INJECTION, SOLUTION INTRAMUSCULAR; INTRAVENOUS at 09:17

## 2020-05-05 RX ADMIN — PHENOBARBITAL SODIUM 130 MG: 130 INJECTION INTRAMUSCULAR; INTRAVENOUS at 20:25

## 2020-05-05 RX ADMIN — PHENOBARBITAL SODIUM 200 MG: 130 INJECTION INTRAMUSCULAR; INTRAVENOUS at 01:01

## 2020-05-05 RX ADMIN — Medication 10 ML: at 16:51

## 2020-05-05 NOTE — PROGRESS NOTES
Patient: Man Perales. MRN: [id number]    Age: 37 y.o. YOB: 1980 Room/Bed: [facility]/   Consent for video monitoring obtained after the below has been explained to the patient/family on 5/5/2020:  1. They are being monitored continuously in an effort to promote their safety. 2. That there may be times when the camera will be discontinued to provide care to me to ensure my dignity, such as during bathing or any activity that risks me being exposed. 3. They have the right to opt out of having this surveillance monitoring at any time. 4. It has been explained to the patient/family and they understand that the hospital does not maintain any recording of this surveillance monitoring.     Iliana Banks RN

## 2020-05-05 NOTE — PROGRESS NOTES
[facility]  Hospitalist Group     ICU Transfer/Accept Summary     This patient is being transferred INTO THE ICU  DATE OF TRANSFER: 5/5/2020       PATIENT ID: Faina Vaughn. MRN: [id number]   YOB: 1980    PRIMARY CARE PROVIDER: None   DATE OF ADMISSION: 5/3/2020  5:21 PM    ATTENDING PHYSICIAN: Angelica Guy NP  CONSULTATIONS:   None    PROCEDURES/SURGERIES:   * No surgery found *    REASON FOR ADMISSION: Alcohol withdrawal syndrome [facility]     HOSPITAL PROBLEM LIST:  Patient Active Problem List   Diagnosis Code    Anxiety F41.9    Back pain M54.9    Coarse tremors G25.2    Essential hypertension, benign I10    Nicotine dependence F17.200    Obesity E66.9    Other and unspecified hyperlipidemia E78.5    Previous back surgery Z98.890    Elevated liver enzymes R74.8    Chest pain R07.9    Insomnia G47.00    Alcohol withdrawal syndrome (HCC) F10.239         Brief HPI and Hospital Course:      38-year-old gentleman with past medical history of hypertension, chronic back pain admitted to [facility] of 5/3/2020 with chief complaint of alcohol withdrawal.  Reports drinking a liter of bourbon daily, last drink was 5/2/2020. The hospitalist service admitted patient to the medical floor under the CIWA protocol, monitoring for withdrawal exacerbation. Patient was tested for COVID, negative. Patient has been treated with hourly Ativan, Valium, has experienced CIWA scores continuously around 20. Patient remains incoherent, occasionally following commands but is for the most part, confused impulsive and agitated. Patient was discussed with the critical care physician, Dr. Vanessa Cuevas, who accepted the patient to the ICU.   Assessment and Plan:    Alcohol abuse/withdrawal  -On CIWA protocol, scores remain around 20 despite Ativan and Valium as needed  -On banana bag  -Transfer to intensive care service for Precedex drip and phenobarbital    Hypertension  -Newly diagnosed, not on prior medications  -May be secondary to alcohol withdrawal, PRN medications  -If remains high, may require starting on daily medication    Tobacco abuse  -Smoking cessation advised  -NicoDerm patch as needed    CODE STATUS: Full  DVT prophylaxis: SubQ heparin       PHYSICAL EXAMINATION:  Visit Vitals  BP (!) 154/92 (BP 1 Location: Left arm, BP Patient Position: At rest)   Pulse 92   Temp 98 °F (36.7 °C)   Resp 18   Ht 6' 1\" (1.854 m)   Wt 92.9 kg (204 lb 12.9 oz)   SpO2 100%   BMI 27.02 kg/m²       General:          Alert, agitated  HEENT:           Atraumatic           Neck:               Supple, symmetrical  Lungs:             Clear to auscultation bilaterally. No Wheezing or Rhonchi. No rales. No respiratory distress  Heart:              Regular  rhythm,  Tachycardic, No  murmur   No edema  Abdomen:       Soft, non-tender. Not distended. Bowel sounds normal  Extremities:     No cyanosis. No clubbing,  +2 distal pulses  Skin:                Not pale. Not Jaundiced  No rashes   Psych:             Agitated.     CODE STATUS:  X Full Code    DNR    Partial    Comfort Care     Signed:   Peg Turner NP  Date of Service:  5/5/2020  12:49 AM

## 2020-05-05 NOTE — PROGRESS NOTES
1930 Bedside and Verbal shift change report given to Ivanna RN (oncoming nurse) by Mike Avilez RN (offgoing nurse). Report included the following information SBAR.     2100 CHG bath and linen change provided. Pt tolerated well. 2330 Pt increased anxiety and restlessness. Loose large BM x3 in 15 minute period. Pt consented to flexi seal. Rubén Longoocco NP paged about increasing CIWA score despite max PRN ativan tx. PRN valium ordered and administered. 0000 Pt pulled out flexi    0045 Pt growing more anxious and attempting to get out of bed. Care transferred to intensivist. Precedex ordered and started. Digna Valle MD ordered phenobarbital. Administered. 0215 Labs drawn    0730 Bedside and Verbal shift change report given to Merly RN (oncoming nurse) by Ivanna RN (offgoing nurse). Report included the following information SBAR.

## 2020-05-05 NOTE — PROGRESS NOTES
0730 Received verbal bedside report from FORREST Gocnalves. Assumed care of the pt. Pt resting comfortably in bed at this time. Pt had an uneventful day. 1930 Bedside and Verbal shift change report given to Melissa Olivier RN (oncoming nurse) by Negar Benitez RN (offgoing nurse). Report included the following information SBAR, Kardex, Intake/Output, MAR, Recent Results and Cardiac Rhythm NSR.

## 2020-05-05 NOTE — CONSULTS
[facility]    ICU Team Consult Note    Name: Kyle Cowan. : 1980   MRN: [id number]   Date: 2020      Subjective:   Progress Note: 2020      Patient is asked to be seen by Dr. Carmelo Horner for EtOH w/d & delirium, necessitating the need for possible ICU care. Reason for ICU Admission: EtOH w/d & delirium     HPI: 39M admit  with s/s EtOH w/d. He'd been trying to quit. He has a h/o EtOH w/d in the past as well. He was [place] hospital[place] [facility], but required escalating amounts of ativan, then valium. [facility] was asked to eval for dex gtt. Currently, pt is incoherent. He will occasionally follow commands, but is clearly confused, impulsive, intermittently agitated. POD:* No surgery found *    S/P:     Active Problem List:     Problem List  Date Reviewed: 5/3/2020          Codes Class    * (Principal) Alcohol withdrawal syndrome (HCC) ICD-10-CM: F10.239  ICD-9-CM: 291.81         Insomnia ICD-10-CM: G47.00  ICD-9-CM: 780.52         Chest pain ICD-10-CM: R07.9  ICD-9-CM: 786.50         Elevated liver enzymes ICD-10-CM: R74.8  ICD-9-CM: 790.5         Previous back surgery ICD-10-CM: Z98.890  ICD-9-CM: V45.89         Other and unspecified hyperlipidemia ICD-10-CM: E78.5  ICD-9-CM: 272.4         Obesity ICD-10-CM: E66.9  ICD-9-CM: 278.00         Nicotine dependence ICD-10-CM: F17.200  ICD-9-CM: 305.1         Essential hypertension, benign ICD-10-CM: I10  ICD-9-CM: 401.1         Coarse tremors ICD-10-CM: G25.2  ICD-9-CM: 781.0         Anxiety ICD-10-CM: F41.9  ICD-9-CM: 300.00         Back pain ICD-10-CM: M54.9  ICD-9-CM: 724.5               Past Medical History:      has a past medical history of Alcoholism ([address] Back pain, Chronic pain, Concussion, H/O spinal fusion, Hypertension, and Insomnia. Past Surgical History:      has a past surgical history that includes hx orthopaedic.     Home Medications:     Prior to Admission medications    Medication Sig Start Date End Date Taking? Authorizing Provider   MULTIVITAMIN PO Take  by mouth. Rony Son MD   lidocaine (LIDODERM) 5 % 1 Patch by TransDERmal route every twenty-four (24) hours. Apply patch to the affected area for 12 hours a day and remove for 12 hours a day. Indications: pain 20   Chantelle Mensah MD   ibuprofen (MOTRIN) 600 mg tablet Take 1 Tab by mouth every six (6) hours as needed for Pain. Indications: minor musculoskeletal injury, pain 20   Chantelle Mensah MD   diclofenac (VOLTAREN) 1 % gel APPLY 4 G TO AFFECTED AREA FOUR (4) TIMES DAILY. 18   Joey Shay MD   LORazepam (ATIVAN) 0.5 mg tablet Take 1 Tab by mouth daily as needed for Anxiety. 11/15/17   Joey Shay MD   QUEtiapine (SEROQUEL) 25 mg tablet Take 1 Tab by mouth two (2) times a day. 17   Joey Shay MD   gabapentin (NEURONTIN) 300 mg capsule TAKE 3 CAPSULES BY MOUTH TWICE A DAY  Patient taking differently: TAKE 3 CAPSULES BY MOUTH THREE TIMES DAILY 10/25/17   Joey Shay MD   traZODone (DESYREL) 100 mg tablet Take 1 Tab by mouth nightly. 17   Joey Shay MD   cholecalciferol, VITAMIN D3, (VITAMIN D3) 5,000 unit tab tablet Take 1 Tab by mouth daily.  17   Mayra Blankenship MD       Allergies/Social/Family History:     No Known Allergies   Social History     Tobacco Use    Smoking status: Former Smoker     Packs/day: 1.00     Years: 10.00     Pack years: 10.00     Types: Cigarettes     Last attempt to quit: 2014     Years since quittin.0    Smokeless tobacco: Current User   Substance Use Topics    Alcohol use: No      Family History   Problem Relation Age of Onset    Diabetes Mother     Hypertension Mother     Thyroid Disease Mother         hypothyroidism    Heart Attack Mother         age 64-smoker    [facility] Arthritis-osteo Mother     Asthma Mother     Heart Disease Mother     Elevated Lipids Father     Heart Disease Father     Hypertension Father     Diabetes Father  Cancer Father         prostate    Heart Attack Father         in 52's    Alcohol abuse Father     Arthritis-osteo Father     Psychiatric Disorder Father        Review of Systems:     A comprehensive review of systems was negative. Objective:   Vital Signs:  Visit Vitals  BP (!) 154/92 (BP 1 Location: Left arm, BP Patient Position: At rest)   Pulse 92   Temp 98 °F (36.7 °C)   Resp 18   Ht 6' 1\" (1.854 m)   Wt 92.9 kg (204 lb 12.9 oz)   SpO2 100%   BMI 27.02 kg/m²      O2 Device: (P) Room air Temp (24hrs), Av.9 °F (36.6 °C), Min:97.3 °F (36.3 °C), Max:98.6 °F (37 °C)           Intake/Output:     Intake/Output Summary (Last 24 hours) at 2020 0054  Last data filed at 2020 2300  Gross per 24 hour   Intake 3018.75 ml   Output 2010 ml   Net 1008.75 ml       Physical Exam:    General:  Incoherent, well noursished, well developed, appears stated age   Eyes:  Sclera anicteric. Pupils equally round and reactive to light. Mouth/Throat: Mucous membranes normal, oral pharynx clear   Neck: Supple   Lungs:   Clear to auscultation bilaterally, good effort   CV:  Regular rate and rhythm,no murmur, click, rub or gallop   Abdomen:   Soft, non-tender.  bowel sounds normal. non-distended   Extremities: No cyanosis or edema   Skin: Skin color, texture, turgor normal. no acute rash or lesions   Lymph nodes: Cervical and supraclavicular normal   Musculoskeletal: No swelling or deformity   Lines/Devices:  Intact, no erythema, drainage or tenderness   Neuro/Psych: Confused, impulsive, intermittently agitated, HANNA, nonfocal grossly       LABS AND  DATA: Personally reviewed  Recent Labs     20  03320  1719   WBC 10.0 7.0   HGB 13.8 14.4   HCT 40.5 41.7    328     Recent Labs     20  03320  1719    137   K 4.1 3.0*    98   CO2 24 24   BUN 6 9   CREA 0.96 1.13   GLU 97 145*   CA 8.5 8.5   MG 1.5* 1.6   PHOS 2.2*  --      Recent Labs     20  0334 20  1719   STACI 52* 34   AP 70 68   TP 6.4 7.1   ALB 3.7 4.0   GLOB 2.7 3.1     No results for input(s): INR, PTP, APTT, INREXT in the last 72 hours. No results for input(s): PHI, PCO2I, PO2I, FIO2I in the last 72 hours. Recent Labs     05/04/20  0328 05/03/20  1719   TROIQ <0.05 <0.05       Hemodynamics:   PAP:   CO:     Wedge:   CI:     CVP:    SVR:       PVR:       Ventilator Settings:  Mode Rate Tidal Volume Pressure FiO2 PEEP                    Peak airway pressure:      Minute ventilation:          MEDS: Reviewed    Chest X-Ray: personally reviewed and report checked        Assessment:     ICU Problems:  EtOH withdrawal syndrome  EtOH dependence  Encephalopathy, acute, toxic/metabolic  Smoker  Dyslipidema  Chronic back pain  HTN  HypoK  HypoMg    ICU Comprehensive Plan of Care:   Plans for this Shift:   1. Accept to [facility]  2. Dex gtt  3. Scheduled phenobarb  4. Prn benzo's, prn haldol  5. Thiamine, folate  6. Replete lytes prn/protocol  7. Further as below    Multidisciplinary Rounds Completed: Yes    ABCDEF Bundle/Checklist  Pain Medications: None  Target RASS: N/A  Sedation Medications: Precedex  CAM-ICU:  Positive  Mobility: Fair and Bedrest  PT/OT: PT consulted and on board and OT consulted and on board   Restraints: Soft wrist restraints  Discussed Plan of Care (goals of care): Yes  Addressed Code Status: Full Code    CARDIOVASCULAR  Cardiac Gtts: None  SBP Goal of: > 90 mmHg  MAP Goal of: > 65 mmHg  Transfusion Trigger (Hgb): <7 g/dL    RESPIRATORY  Vent Goals:    Incentive spirometry  DVT Prophylaxis (if no, list reason): Heparin   SPO2 Goal: > 92%  Pulmonary toilet: Duo-Nebs     GI/  [place] Catheter Present: No  GI Prophylaxis: Not at this time   Nutrition: No npo  IVFs: LR  Bowel Movement: Yes  Bowel Regimen: None needed at this time  Insulin: ssi    ANTIBIOTICS  Antibiotics:  n/a    T/L/D  Tubes: None  Lines: Peripheral IV  Drains: None    SPECIAL EQUIPMENT  None    DISPOSITION  Stay in ICU    CRITICAL CARE CONSULTANT NOTE  I had a face to face encounter with the patient, reviewed and interpreted patient data including clinical events, labs, images, vital signs, I/O's, and examined patient. I have discussed the case and the plan and management of the patient's care with the consulting services, the bedside nurses and the respiratory therapist.      NOTE OF PERSONAL INVOLVEMENT IN CARE   This patient has a high probability of imminent, clinically significant deterioration, which requires the highest level of preparedness to intervene urgently. I participated in the decision-making and personally managed or directed the management of the following life and organ supporting interventions that required my frequent assessment to treat or prevent imminent deterioration. I personally spent 31 minutes of critical care time. This is time spent at this critically ill patient's bedside actively involved in patient care as well as the coordination of care and discussions with the patient's family. This does not include any procedural time which has been billed separately.     Cecilia Seymour DO  Pulmonary, Critical Care Medicine  [facility] Critical Care  5/5/2020

## 2020-05-05 NOTE — PROGRESS NOTES
39M admit 5/4 with s/s EtOH w/d. He'd been trying to quit. He has a h/o EtOH w/d in the past as well. [place] [facility] [place] but required escalating amounts of ativan, then valium. [facility] was asked to eval for dex gtt. Currently, pt is incoherent. He will occasionally follow commands, but is clearly confused, impulsive, intermittently agitated. Interval changes-now back on the ICU service, required impressive doses of benzodiazepines yesterday and overnight, started on a Precedex drip and phenobarbital      Patient Vitals for the past 24 hrs:   Temp Pulse Resp BP SpO2   05/05/20 1700  72 15 102/57 94 %   05/05/20 1600 97.9 °F (36.6 °C) 71 16 118/83 96 %   05/05/20 1500  70 13 114/72 96 %   05/05/20 1400  75 13 126/77    05/05/20 1300  75 14 (!) 119/96 97 %   05/05/20 1200 97.8 °F (36.6 °C) 74 17 119/76 96 %   05/05/20 1100  73 13 (!) 143/91 97 %   05/05/20 1000  68 13 132/83 97 %   05/05/20 0900  66 14 135/86 97 %   05/05/20 0800 97.3 °F (36.3 °C) 61 14 127/86 98 %   05/05/20 0700  61 14 (!) 140/94 97 %   05/05/20 0600  64 14 (!) 143/92 97 %   05/05/20 0500  62 15 (!) 133/94 97 %   05/05/20 0400 98.4 °F (36.9 °C) 63 15 110/83 97 %   05/05/20 0300  68 15 120/79 97 %   05/05/20 0200  80 16 124/84 95 %   05/05/20 0100  100 17 (!) 141/103 99 %   05/05/20 0000 98 °F (36.7 °C) 92 18 (!) 154/92 100 %   05/04/20 2300  93 17 148/89 100 %   05/04/20 2200  86 29 (!) 149/97 99 %   05/04/20 2100  93 15 (!) 154/96 96 %   05/04/20 2000 97.6 °F (36.4 °C) 100 20 (!) 150/95 97 %   05/04/20 1900  97 17 (!) 150/98 97 %   05/04/20 1800  93  (!) 141/116 97 %     Physical exam  General-NAD  Neuro-lethargic status post multiple doses of sedatives  Cardiac-RRR  Lungs-clear  Abdomen-soft, nontender, nondistended  Extremities-warm    Recent Results (from the past 24 hour(s))   CBC W/O DIFF    Collection Time: 05/05/20  2:10 AM   Result Value Ref Range    WBC 6.7 4.1 - 11.1 K/uL    RBC 4.10 4. 10 - 5.70 M/uL    HGB 12.9 12.1 - 17.0 g/dL    HCT 37.6 36.6 - 50.3 %    MCV 91.7 80.0 - 99.0 FL    MCH 31.5 26.0 - 34.0 PG    MCHC 34.3 30.0 - 36.5 g/dL    RDW 13.2 11.5 - 14.5 %    PLATELET 429 391 - 255 K/uL    MPV 10.9 8.9 - 12.9 FL    NRBC 0.0 0  WBC    ABSOLUTE NRBC 0.00 0.00 - 6.00 K/uL   METABOLIC PANEL, BASIC    Collection Time: 05/05/20  2:10 AM   Result Value Ref Range    Sodium 137 136 - 145 mmol/L    Potassium 3.7 3.5 - 5.1 mmol/L    Chloride 107 97 - 108 mmol/L    CO2 23 21 - 32 mmol/L    Anion gap 7 5 - 15 mmol/L    Glucose 103 (H) 65 - 100 mg/dL    BUN 5 (L) 6 - 20 MG/DL    Creatinine 0.65 (L) 0.70 - 1.30 MG/DL    BUN/Creatinine ratio 8 (L) 12 - 20      GFR est AA >60 >60 ml/min/1.73m2    GFR est non-AA >60 >60 ml/min/1.73m2    Calcium 8.1 (L) 8.5 - 10.1 MG/DL   MAGNESIUM    Collection Time: 05/05/20  2:10 AM   Result Value Ref Range    Magnesium 2.1 1.6 - 2.4 mg/dL   PHOSPHORUS    Collection Time: 05/05/20  2:10 AM   Result Value Ref Range    Phosphorus 2.5 (L) 2.6 - 4.7 MG/DL     Imaging reviewed    Assessment  Severe alcohol withdrawal/delirium tremens    Plan  Neuro-serial neuro checks, continue alcohol withdrawal protocol, agitation management with benzodiazepines, Precedex and phenobarbital, continue thiamine and folate supplementation    Cardiac-continue hemodynamic monitoring, map goal greater than 65    Respiratory-supplemental oxygen as needed, saturation goal greater than 90%    GI-diet as tolerated depending on mental status, ensure bowel movements    Renal-monitor urine output, daily BMPs, correct electrolyte derangements as needed    Hematology-Daily CBCs, DVT prophylaxis with Lovenox    ID-monitor off antibiotics for now    Endo-keep glucose less than 180 with subcutaneous insulin/dextrose as needed    Critical care time-35 minutes

## 2020-05-06 PROCEDURE — 74011250636 HC RX REV CODE- 250/636: Performed by: EMERGENCY MEDICINE

## 2020-05-06 PROCEDURE — 74011250636 HC RX REV CODE- 250/636: Performed by: INTERNAL MEDICINE

## 2020-05-06 PROCEDURE — 74011250637 HC RX REV CODE- 250/637: Performed by: INTERNAL MEDICINE

## 2020-05-06 PROCEDURE — 74011000258 HC RX REV CODE- 258: Performed by: INTERNAL MEDICINE

## 2020-05-06 PROCEDURE — 74011250637 HC RX REV CODE- 250/637: Performed by: HOSPITALIST

## 2020-05-06 PROCEDURE — 65620000000 HC RM CCU GENERAL

## 2020-05-06 PROCEDURE — 74011000250 HC RX REV CODE- 250: Performed by: INTERNAL MEDICINE

## 2020-05-06 PROCEDURE — 74011000258 HC RX REV CODE- 258: Performed by: EMERGENCY MEDICINE

## 2020-05-06 PROCEDURE — 74011250637 HC RX REV CODE- 250/637: Performed by: EMERGENCY MEDICINE

## 2020-05-06 RX ORDER — SODIUM CHLORIDE, SODIUM LACTATE, POTASSIUM CHLORIDE, CALCIUM CHLORIDE 600; 310; 30; 20 MG/100ML; MG/100ML; MG/100ML; MG/100ML
100 INJECTION, SOLUTION INTRAVENOUS CONTINUOUS
Status: DISCONTINUED | OUTPATIENT
Start: 2020-05-06 | End: 2020-05-07

## 2020-05-06 RX ORDER — CHLORDIAZEPOXIDE HYDROCHLORIDE 25 MG/1
50 CAPSULE, GELATIN COATED ORAL EVERY 8 HOURS
Status: DISCONTINUED | OUTPATIENT
Start: 2020-05-06 | End: 2020-05-09

## 2020-05-06 RX ORDER — PHENOBARBITAL SODIUM 65 MG/ML
130 INJECTION INTRAMUSCULAR ONCE
Status: COMPLETED | OUTPATIENT
Start: 2020-05-06 | End: 2020-05-06

## 2020-05-06 RX ORDER — GABAPENTIN 300 MG/1
300 CAPSULE ORAL 3 TIMES DAILY
Status: DISCONTINUED | OUTPATIENT
Start: 2020-05-06 | End: 2020-05-10 | Stop reason: DRUGHIGH

## 2020-05-06 RX ADMIN — CHLORDIAZEPOXIDE HYDROCHLORIDE 50 MG: 25 CAPSULE ORAL at 21:30

## 2020-05-06 RX ADMIN — DEXMEDETOMIDINE HYDROCHLORIDE 0.5 MCG/KG/HR: 100 INJECTION, SOLUTION, CONCENTRATE INTRAVENOUS at 23:45

## 2020-05-06 RX ADMIN — GABAPENTIN 300 MG: 250 SOLUTION ORAL at 08:35

## 2020-05-06 RX ADMIN — PHENOBARBITAL SODIUM 130 MG: 65 INJECTION INTRAMUSCULAR; INTRAVENOUS at 09:40

## 2020-05-06 RX ADMIN — FOLIC ACID: 5 INJECTION, SOLUTION INTRAMUSCULAR; INTRAVENOUS; SUBCUTANEOUS at 09:44

## 2020-05-06 RX ADMIN — PHENOBARBITAL SODIUM 130 MG: 130 INJECTION INTRAMUSCULAR; INTRAVENOUS at 04:08

## 2020-05-06 RX ADMIN — Medication 10 ML: at 21:30

## 2020-05-06 RX ADMIN — GABAPENTIN 300 MG: 300 CAPSULE ORAL at 21:36

## 2020-05-06 RX ADMIN — CHLORDIAZEPOXIDE HYDROCHLORIDE 50 MG: 25 CAPSULE ORAL at 13:24

## 2020-05-06 RX ADMIN — SODIUM CHLORIDE, SODIUM LACTATE, POTASSIUM CHLORIDE, AND CALCIUM CHLORIDE 100 ML/HR: 600; 310; 30; 20 INJECTION, SOLUTION INTRAVENOUS at 09:32

## 2020-05-06 RX ADMIN — DEXMEDETOMIDINE HYDROCHLORIDE 0.3 MCG/KG/HR: 100 INJECTION, SOLUTION, CONCENTRATE INTRAVENOUS at 07:43

## 2020-05-06 RX ADMIN — POTASSIUM & SODIUM PHOSPHATES POWDER PACK 280-160-250 MG 1 PACKET: 280-160-250 PACK at 21:30

## 2020-05-06 RX ADMIN — ONDANSETRON 4 MG: 2 INJECTION INTRAMUSCULAR; INTRAVENOUS at 16:07

## 2020-05-06 RX ADMIN — POTASSIUM & SODIUM PHOSPHATES POWDER PACK 280-160-250 MG 1 PACKET: 280-160-250 PACK at 12:07

## 2020-05-06 RX ADMIN — LORAZEPAM 2 MG: 2 INJECTION INTRAMUSCULAR; INTRAVENOUS at 18:18

## 2020-05-06 RX ADMIN — LORAZEPAM 2 MG: 2 INJECTION INTRAMUSCULAR; INTRAVENOUS at 21:30

## 2020-05-06 RX ADMIN — LORAZEPAM 2 MG: 2 INJECTION INTRAMUSCULAR; INTRAVENOUS at 19:19

## 2020-05-06 RX ADMIN — GABAPENTIN 300 MG: 250 SOLUTION ORAL at 13:27

## 2020-05-06 RX ADMIN — CHLORDIAZEPOXIDE HYDROCHLORIDE 50 MG: 25 CAPSULE ORAL at 08:34

## 2020-05-06 RX ADMIN — POTASSIUM & SODIUM PHOSPHATES POWDER PACK 280-160-250 MG 1 PACKET: 280-160-250 PACK at 08:33

## 2020-05-06 RX ADMIN — SODIUM CHLORIDE, SODIUM LACTATE, POTASSIUM CHLORIDE, AND CALCIUM CHLORIDE 100 ML/HR: 600; 310; 30; 20 INJECTION, SOLUTION INTRAVENOUS at 20:01

## 2020-05-06 RX ADMIN — POTASSIUM & SODIUM PHOSPHATES POWDER PACK 280-160-250 MG 1 PACKET: 280-160-250 PACK at 17:31

## 2020-05-06 RX ADMIN — THIAMINE HYDROCHLORIDE 100 MG: 100 INJECTION, SOLUTION INTRAMUSCULAR; INTRAVENOUS at 09:33

## 2020-05-06 RX ADMIN — ENOXAPARIN SODIUM 40 MG: 40 INJECTION SUBCUTANEOUS at 08:32

## 2020-05-06 NOTE — PROGRESS NOTES
1930: Report received from FORREST Moreland  0979: Bedside shift change report given to FORREST Matthews (oncoming nurse) by Sunny Franklin (offgoing nurse). Report included the following information SBAR, Kardex, Intake/Output and MAR.

## 2020-05-06 NOTE — PROGRESS NOTES
39M admit 5/4 with s/s EtOH w/d. He'd been trying to quit. He has a h/o EtOH w/d in the past as well. He was admitted to the hospitalist [facility], but required escalating amounts of ativan, then valium. [facility] was asked to eval for dex gtt. Currently, pt is incoherent. He will occasionally follow commands, but is clearly confused, impulsive, intermittently agitated. Interval changes-still on Precedex but requirements coming down, done with initial phenobarb load       Patient Vitals for the past 24 hrs:   Temp Pulse Resp BP SpO2   05/06/20 1600 98.2 °F (36.8 °C) 96 18 (!) 150/102 95 %   05/06/20 1500  100 18 (!) 138/93 96 %   05/06/20 1400  95 15 115/63 95 %   05/06/20 1300  (!) 105 22 111/80 96 %   05/06/20 1200 98.7 °F (37.1 °C) 93 20 131/90 98 %   05/06/20 1100  86 16 113/75 95 %   05/06/20 1000  (!) 111 14 127/88 97 %   05/06/20 0930  (!) 105 21  98 %   05/06/20 0900  (!) 119 22 135/85    05/06/20 0800 98 °F (36.7 °C) 79 20 (!) 152/111 97 %   05/06/20 0700  80 14 (!) 154/104 100 %   05/06/20 0600  76 16 123/90 97 %   05/06/20 0500  71 18 (!) 87/68 99 %   05/06/20 0400 98 °F (36.7 °C) 76 15 117/82 97 %   05/06/20 0300  75 15 128/89 98 %   05/06/20 0200  75 16 (!) 130/94 97 %   05/06/20 0100  71 16 150/90 99 %   05/06/20 0000 98 °F (36.7 °C) 79 15 149/71 100 %   05/05/20 2300  75 17 132/88 98 %   05/05/20 2200  77 18 (!) 147/109 100 %   05/05/20 2100  78 17 (!) 146/97 99 %   05/05/20 2000 98 °F (36.7 °C) 67 18 133/87 97 %   05/05/20 1900  67 16 132/86 97 %   05/05/20 1800  81 21 133/86    05/05/20 1700  72 15 102/57 94 %     Physical exam  General-NAD  Neuro- AAO x 2, intermitted impulsiveness and confusion  Cardiac-RRR  Lungs-clear  Abdomen-soft, nontender, nondistended  Extremities-warm    No results found for this or any previous visit (from the past 24 hour(s)).     Imaging reviewed    Assessment  Severe alcohol withdrawal/delirium tremens    Plan  Neuro-serial neuro checks, continue alcohol withdrawal protocol, agitation management with benzodiazepines, Precedex as needed,continue gabapentin, might consider using more phenobarb, continue thiamine and folate supplementation    Cardiac-continue hemodynamic monitoring, map goal greater than 65    Respiratory-supplemental oxygen as needed, saturation goal greater than 90%    GI-diet as tolerated depending on mental status, ensure bowel movements    Renal-monitor urine output, daily BMPs, correct electrolyte derangements as needed, gentle hydration today    Hematology-Daily CBCs, DVT prophylaxis with Lovenox    ID-monitor off antibiotics for now    Endo-keep glucose less than 180 with subcutaneous insulin/dextrose as needed    Critical care time-35 minutes

## 2020-05-06 NOTE — PROGRESS NOTES
Transitions of Care Plan:     Anticipate return home once medically stable     Patient remains on precedex gtt; CCU; CIWA protocol; ETOH withdrawal    Reason for Admission:   Alcohol withdrawal                   RUR Score:          11%           Plan for utilizing home health:     No     PCP: First and Last name:  CM to follow up once patient alert/oriented   Name of Practice:    Are you a current patient: Yes/No:    Approximate date of last visit:                     Current Advanced Directive/Advance Care Plan: Not on file. Transition of Care Plan:                      CM attempted to reach brother who has been calling CCU for updates (Will p: [phone]); busy dial tone when phone number is called. Per RN, patient declines having a wife, significant other, or girlfriend. CM attempting to call [facility] once known. Patient remains on precedex; some hallucinations but easily direct able. Chart Review -  Independent at baseline  Social - patient relapsed 2 days ago after 8 months of sobriety. Patient attends [place] [address] meetings and has a sponsor. CM will follow up with patient once appropriate. Douglas Hernandez, MPH    Care Management Interventions  PCP Verified by CM: No  Palliative Care Criteria Met (RRAT>21 & CHF Dx)?: No  Mode of Transport at Discharge:  Other (see comment)(Friend, private car)  Transition of Care Consult (CM Consult): Discharge Planning  MyChart Signup: Yes  Discharge Durable Medical Equipment: No  Health Maintenance Reviewed: Yes  Physical Therapy Consult: No  Occupational Therapy Consult: No  Speech Therapy Consult: No  Current Support Network: Own Home  Confirm Follow Up Transport: Self  Discharge Location  Discharge Placement: Home

## 2020-05-06 NOTE — PROGRESS NOTES
0730- Report obtained from [facility]. Patient calm and resting in bed. 0930- IV access has been lost. Reestablished now. Patient calm but having some issues with hallucinations (thinking sister is in the room) and spontaneous activity. 1100- Patient sleeping in bed. Seems more relaxed. 1215- Lunch is served. Precedex reduced now. 1520- Precedex off now. Will evaluate. 1600- Patient ambulated in the hallway with nursing assistance. Became symptomatic (Nausea and shaking) after 50 feet. Back to bed. Zofran given along with a restart of the precedex. 1820-Patient becoming more symptomatic (Hallucinations, Anxiety elevated). Ativan given now. Increase of Precedex gtt. 1920- Ativan given for persistent ETOH symptoms. 1945- Patient sleeping now. Report given to [facility].

## 2020-05-07 LAB
ANION GAP SERPL CALC-SCNC: 7 MMOL/L (ref 5–15)
BASOPHILS # BLD: 0 K/UL (ref 0–0.1)
BASOPHILS NFR BLD: 0 % (ref 0–1)
BUN SERPL-MCNC: 7 MG/DL (ref 6–20)
BUN/CREAT SERPL: 11 (ref 12–20)
CALCIUM SERPL-MCNC: 8.3 MG/DL (ref 8.5–10.1)
CHLORIDE SERPL-SCNC: 106 MMOL/L (ref 97–108)
CO2 SERPL-SCNC: 23 MMOL/L (ref 21–32)
CREAT SERPL-MCNC: 0.64 MG/DL (ref 0.7–1.3)
DIFFERENTIAL METHOD BLD: ABNORMAL
EOSINOPHIL # BLD: 0.2 K/UL (ref 0–0.4)
EOSINOPHIL NFR BLD: 3 % (ref 0–7)
ERYTHROCYTE [DISTWIDTH] IN BLOOD BY AUTOMATED COUNT: 13.9 % (ref 11.5–14.5)
GLUCOSE SERPL-MCNC: 107 MG/DL (ref 65–100)
HCT VFR BLD AUTO: 36.5 % (ref 36.6–50.3)
HGB BLD-MCNC: 12.2 G/DL (ref 12.1–17)
IMM GRANULOCYTES # BLD AUTO: 0.1 K/UL (ref 0–0.04)
IMM GRANULOCYTES NFR BLD AUTO: 1 % (ref 0–0.5)
LYMPHOCYTES # BLD: 1.5 K/UL (ref 0.8–3.5)
LYMPHOCYTES NFR BLD: 22 % (ref 12–49)
MAGNESIUM SERPL-MCNC: 1.9 MG/DL (ref 1.6–2.4)
MCH RBC QN AUTO: 30.6 PG (ref 26–34)
MCHC RBC AUTO-ENTMCNC: 33.4 G/DL (ref 30–36.5)
MCV RBC AUTO: 91.5 FL (ref 80–99)
MONOCYTES # BLD: 0.7 K/UL (ref 0–1)
MONOCYTES NFR BLD: 10 % (ref 5–13)
NEUTS SEG # BLD: 4.4 K/UL (ref 1.8–8)
NEUTS SEG NFR BLD: 64 % (ref 32–75)
NRBC # BLD: 0 K/UL (ref 0–0.01)
NRBC BLD-RTO: 0 PER 100 WBC
PHOSPHATE SERPL-MCNC: 3 MG/DL (ref 2.6–4.7)
PLATELET # BLD AUTO: 152 K/UL (ref 150–400)
PMV BLD AUTO: 10.6 FL (ref 8.9–12.9)
POTASSIUM SERPL-SCNC: 3.3 MMOL/L (ref 3.5–5.1)
RBC # BLD AUTO: 3.99 M/UL (ref 4.1–5.7)
SODIUM SERPL-SCNC: 136 MMOL/L (ref 136–145)
WBC # BLD AUTO: 6.9 K/UL (ref 4.1–11.1)

## 2020-05-07 PROCEDURE — 74011250637 HC RX REV CODE- 250/637: Performed by: EMERGENCY MEDICINE

## 2020-05-07 PROCEDURE — 74011250636 HC RX REV CODE- 250/636: Performed by: INTERNAL MEDICINE

## 2020-05-07 PROCEDURE — 80048 BASIC METABOLIC PNL TOTAL CA: CPT

## 2020-05-07 PROCEDURE — 74011250636 HC RX REV CODE- 250/636: Performed by: EMERGENCY MEDICINE

## 2020-05-07 PROCEDURE — 74011000250 HC RX REV CODE- 250: Performed by: INTERNAL MEDICINE

## 2020-05-07 PROCEDURE — 74011250637 HC RX REV CODE- 250/637: Performed by: INTERNAL MEDICINE

## 2020-05-07 PROCEDURE — 65620000000 HC RM CCU GENERAL

## 2020-05-07 PROCEDURE — 74011000258 HC RX REV CODE- 258: Performed by: INTERNAL MEDICINE

## 2020-05-07 PROCEDURE — 83735 ASSAY OF MAGNESIUM: CPT

## 2020-05-07 PROCEDURE — 85025 COMPLETE CBC W/AUTO DIFF WBC: CPT

## 2020-05-07 PROCEDURE — 36415 COLL VENOUS BLD VENIPUNCTURE: CPT

## 2020-05-07 PROCEDURE — 74011000258 HC RX REV CODE- 258: Performed by: EMERGENCY MEDICINE

## 2020-05-07 PROCEDURE — 74011250637 HC RX REV CODE- 250/637: Performed by: HOSPITALIST

## 2020-05-07 PROCEDURE — 84100 ASSAY OF PHOSPHORUS: CPT

## 2020-05-07 RX ORDER — FOLIC ACID 1 MG/1
1 TABLET ORAL DAILY
Status: DISCONTINUED | OUTPATIENT
Start: 2020-05-07 | End: 2020-05-07

## 2020-05-07 RX ORDER — PHENOBARBITAL SODIUM 65 MG/ML
130 INJECTION INTRAMUSCULAR EVERY 4 HOURS
Status: COMPLETED | OUTPATIENT
Start: 2020-05-07 | End: 2020-05-07

## 2020-05-07 RX ORDER — LORAZEPAM 2 MG/ML
4 INJECTION INTRAMUSCULAR
Status: DISCONTINUED | OUTPATIENT
Start: 2020-05-08 | End: 2020-05-07

## 2020-05-07 RX ORDER — POTASSIUM CHLORIDE 750 MG/1
40 TABLET, FILM COATED, EXTENDED RELEASE ORAL
Status: COMPLETED | OUTPATIENT
Start: 2020-05-07 | End: 2020-05-07

## 2020-05-07 RX ORDER — FOLIC ACID 1 MG/1
1 TABLET ORAL DAILY
Status: DISCONTINUED | OUTPATIENT
Start: 2020-05-08 | End: 2020-05-12 | Stop reason: HOSPADM

## 2020-05-07 RX ORDER — LANOLIN ALCOHOL/MO/W.PET/CERES
100 CREAM (GRAM) TOPICAL DAILY
Status: DISCONTINUED | OUTPATIENT
Start: 2020-05-07 | End: 2020-05-07

## 2020-05-07 RX ORDER — LANOLIN ALCOHOL/MO/W.PET/CERES
100 CREAM (GRAM) TOPICAL DAILY
Status: DISCONTINUED | OUTPATIENT
Start: 2020-05-08 | End: 2020-05-12 | Stop reason: HOSPADM

## 2020-05-07 RX ORDER — LORAZEPAM 2 MG/ML
4 INJECTION INTRAMUSCULAR ONCE
Status: COMPLETED | OUTPATIENT
Start: 2020-05-08 | End: 2020-05-07

## 2020-05-07 RX ADMIN — SODIUM CHLORIDE 468 MG: 900 INJECTION, SOLUTION INTRAVENOUS at 22:24

## 2020-05-07 RX ADMIN — HALOPERIDOL LACTATE 5 MG: 5 INJECTION, SOLUTION INTRAMUSCULAR at 23:41

## 2020-05-07 RX ADMIN — HALOPERIDOL LACTATE 5 MG: 5 INJECTION, SOLUTION INTRAMUSCULAR at 15:44

## 2020-05-07 RX ADMIN — POTASSIUM CHLORIDE 40 MEQ: 750 TABLET, FILM COATED, EXTENDED RELEASE ORAL at 09:37

## 2020-05-07 RX ADMIN — CHLORDIAZEPOXIDE HYDROCHLORIDE 50 MG: 25 CAPSULE ORAL at 05:50

## 2020-05-07 RX ADMIN — GABAPENTIN 300 MG: 300 CAPSULE ORAL at 21:12

## 2020-05-07 RX ADMIN — LORAZEPAM 2 MG: 2 INJECTION INTRAMUSCULAR; INTRAVENOUS at 23:35

## 2020-05-07 RX ADMIN — LORAZEPAM 2 MG: 2 INJECTION INTRAMUSCULAR; INTRAVENOUS at 09:37

## 2020-05-07 RX ADMIN — CHLORDIAZEPOXIDE HYDROCHLORIDE 50 MG: 25 CAPSULE ORAL at 13:16

## 2020-05-07 RX ADMIN — Medication 10 ML: at 13:17

## 2020-05-07 RX ADMIN — ENOXAPARIN SODIUM 40 MG: 40 INJECTION SUBCUTANEOUS at 08:27

## 2020-05-07 RX ADMIN — FOLIC ACID: 5 INJECTION, SOLUTION INTRAMUSCULAR; INTRAVENOUS; SUBCUTANEOUS at 08:30

## 2020-05-07 RX ADMIN — PHENOBARBITAL SODIUM 130 MG: 65 INJECTION INTRAMUSCULAR; INTRAVENOUS at 12:30

## 2020-05-07 RX ADMIN — POTASSIUM BICARBONATE 40 MEQ: 782 TABLET, EFFERVESCENT ORAL at 04:25

## 2020-05-07 RX ADMIN — POTASSIUM & SODIUM PHOSPHATES POWDER PACK 280-160-250 MG 1 PACKET: 280-160-250 PACK at 18:18

## 2020-05-07 RX ADMIN — LORAZEPAM 4 MG: 2 INJECTION INTRAMUSCULAR; INTRAVENOUS at 23:49

## 2020-05-07 RX ADMIN — CHLORDIAZEPOXIDE HYDROCHLORIDE 50 MG: 25 CAPSULE ORAL at 21:12

## 2020-05-07 RX ADMIN — ONDANSETRON 4 MG: 2 INJECTION INTRAMUSCULAR; INTRAVENOUS at 09:18

## 2020-05-07 RX ADMIN — PHENOBARBITAL SODIUM 130 MG: 65 INJECTION INTRAMUSCULAR; INTRAVENOUS at 08:27

## 2020-05-07 RX ADMIN — POTASSIUM & SODIUM PHOSPHATES POWDER PACK 280-160-250 MG 1 PACKET: 280-160-250 PACK at 21:12

## 2020-05-07 RX ADMIN — LORAZEPAM 2 MG: 2 INJECTION INTRAMUSCULAR; INTRAVENOUS at 18:18

## 2020-05-07 RX ADMIN — POTASSIUM & SODIUM PHOSPHATES POWDER PACK 280-160-250 MG 1 PACKET: 280-160-250 PACK at 08:27

## 2020-05-07 RX ADMIN — THIAMINE HYDROCHLORIDE 100 MG: 100 INJECTION, SOLUTION INTRAMUSCULAR; INTRAVENOUS at 08:30

## 2020-05-07 RX ADMIN — GABAPENTIN 300 MG: 300 CAPSULE ORAL at 08:27

## 2020-05-07 RX ADMIN — GABAPENTIN 300 MG: 300 CAPSULE ORAL at 15:42

## 2020-05-07 RX ADMIN — POTASSIUM & SODIUM PHOSPHATES POWDER PACK 280-160-250 MG 1 PACKET: 280-160-250 PACK at 13:16

## 2020-05-07 RX ADMIN — LORAZEPAM 2 MG: 2 INJECTION INTRAMUSCULAR; INTRAVENOUS at 21:25

## 2020-05-07 RX ADMIN — DEXMEDETOMIDINE HYDROCHLORIDE 0.4 MCG/KG/HR: 100 INJECTION, SOLUTION, CONCENTRATE INTRAVENOUS at 13:30

## 2020-05-07 NOTE — PROGRESS NOTES
39M admit 5/4 with s/s EtOH w/d. He'd been trying to quit. He has a h/o EtOH w/d in the past as well. He was admitted to the hospitalist [facility], but required escalating amounts of ativan, then valium. [facility] was asked to eval for dex gtt. Currently, pt is incoherent. He will occasionally follow commands, but is clearly confused, impulsive, intermittently agitated.        Interval changes-still on Precedex, giving another load of phenobarbital    Patient Vitals for the past 24 hrs:   Temp Pulse Resp BP SpO2   05/07/20 1400  79 17 122/75 91 %   05/07/20 1300  (!) 107 15 143/83 92 %   05/07/20 1200 97.8 °F (36.6 °C) 86 21 (!) 159/93 95 %   05/07/20 1100  96 22 144/85 91 %   05/07/20 1000  88 18 133/88 96 %   05/07/20 0900  85 21 (!) 87/75 96 %   05/07/20 0800 98 °F (36.7 °C) 67 16 (!) 146/92 99 %   05/07/20 0700  69 18 129/78 97 %   05/07/20 0600  69 17 105/73 96 %   05/07/20 0500  62 13 122/79 96 %   05/07/20 0400 97.9 °F (36.6 °C) 62 13 129/83 96 %   05/07/20 0300  68 12 131/85 99 %   05/07/20 0200  61 13 107/73 95 %   05/07/20 0100  60 13 125/86 96 %   05/07/20 0000 97.4 °F (36.3 °C) 68 12 136/89 97 %   05/06/20 2300  73 14 123/83 95 %   05/06/20 2200  67 15 122/82 96 %   05/06/20 2100  80 18 113/78 95 %   05/06/20 2000 97.2 °F (36.2 °C) 82 16 117/79 94 %   05/06/20 1900  93 19 133/90 95 %   05/06/20 1830  (!) 107 28  97 %   05/06/20 1800  (!) 109 15 (!) 157/99 96 %   05/06/20 1700  99 17 138/87 92 %   05/06/20 1600 98.2 °F (36.8 °C) 96 18 (!) 150/102 95 %     Physical exam  General-NAD  Neuro- AAO x 2, intermitted impulsiveness and confusion  Cardiac-RRR  Lungs-clear  Abdomen-soft, nontender, nondistended  Extremities-warm    Recent Results (from the past 24 hour(s))   CBC WITH AUTOMATED DIFF    Collection Time: 05/07/20  3:16 AM   Result Value Ref Range    WBC 6.9 4.1 - 11.1 K/uL    RBC 3.99 (L) 4.10 - 5.70 M/uL    HGB 12.2 12.1 - 17.0 g/dL    HCT 36.5 (L) 36.6 - 50.3 %    MCV 91.5 80.0 - 99.0 FL MCH 30.6 26.0 - 34.0 PG    MCHC 33.4 30.0 - 36.5 g/dL    RDW 13.9 11.5 - 14.5 %    PLATELET 390 274 - 358 K/uL    MPV 10.6 8.9 - 12.9 FL    NRBC 0.0 0  WBC    ABSOLUTE NRBC 0.00 0.00 - 0.01 K/uL    NEUTROPHILS 64 32 - 75 %    LYMPHOCYTES 22 12 - 49 %    MONOCYTES 10 5 - 13 %    EOSINOPHILS 3 0 - 7 %    BASOPHILS 0 0 - 1 %    IMMATURE GRANULOCYTES 1 (H) 0.0 - 0.5 %    ABS. NEUTROPHILS 4.4 1.8 - 8.0 K/UL    ABS. LYMPHOCYTES 1.5 0.8 - 3.5 K/UL    ABS. MONOCYTES 0.7 0.0 - 1.0 K/UL    ABS. EOSINOPHILS 0.2 0.0 - 0.4 K/UL    ABS. BASOPHILS 0.0 0.0 - 0.1 K/UL    ABS. IMM.  GRANS. 0.1 (H) 0.00 - 0.04 K/UL    DF AUTOMATED     METABOLIC PANEL, BASIC    Collection Time: 05/07/20  3:16 AM   Result Value Ref Range    Sodium 136 136 - 145 mmol/L    Potassium 3.3 (L) 3.5 - 5.1 mmol/L    Chloride 106 97 - 108 mmol/L    CO2 23 21 - 32 mmol/L    Anion gap 7 5 - 15 mmol/L    Glucose 107 (H) 65 - 100 mg/dL    BUN 7 6 - 20 MG/DL    Creatinine 0.64 (L) 0.70 - 1.30 MG/DL    BUN/Creatinine ratio 11 (L) 12 - 20      GFR est AA >60 >60 ml/min/1.73m2    GFR est non-AA >60 >60 ml/min/1.73m2    Calcium 8.3 (L) 8.5 - 10.1 MG/DL   MAGNESIUM    Collection Time: 05/07/20  3:16 AM   Result Value Ref Range    Magnesium 1.9 1.6 - 2.4 mg/dL   PHOSPHORUS    Collection Time: 05/07/20  3:16 AM   Result Value Ref Range    Phosphorus 3.0 2.6 - 4.7 MG/DL       Imaging reviewed    Assessment  Severe alcohol withdrawal/delirium tremens    Plan  Neuro-serial neuro checks, continue alcohol withdrawal protocol, agitation management with benzodiazepines, phenobarbital and Precedex as needed,continue gabapentin, continue thiamine and folate supplementation    Cardiac-continue hemodynamic monitoring, map goal greater than 65    Respiratory-supplemental oxygen as needed, saturation goal greater than 90%    GI-diet as tolerated depending on mental status, ensure bowel movements    Renal-monitor urine output, daily BMPs, correct electrolyte derangements as needed    Hematology-Daily CBCs, DVT prophylaxis with Lovenox    ID-monitor off antibiotics for now    Endo-keep glucose less than 180 with subcutaneous insulin/dextrose as needed    Critical care time-35 minutes

## 2020-05-07 NOTE — PROGRESS NOTES
0730: Bedside shift change report given to FORREST Fagan (oncoming nurse) by Caty Benz RN (offgoing nurse). Report included the following information SBAR, Kardex, ED Summary, Intake/Output, MAR, Recent Results and Cardiac Rhythm NSR.     1530: Bedside shift change report given to FORREST Sabillon (oncoming nurse) by FORREST Fagan (offgoing nurse). Report included the following information SBAR, Kardex, ED Summary, Procedure Summary, Intake/Output, MAR, Recent Results and Cardiac Rhythm NSR.

## 2020-05-07 NOTE — PROGRESS NOTES
1930: Bedside and Verbal shift change report given to [facility], RN (oncoming nurse) by Eddie Seth RN (offgoing nurse). Report included the following information SBAR, ED Summary, Intake/Output, MAR, Recent Results, Cardiac Rhythm NSR and Alarm Parameters . 2000: Pt assessed. Currently sleeping, does not appear to be agitated or in pain at this time. 2130: Pt confused, getting out of bed. 2mg of ativan given    0000: Pt reassessed, no new changes noted at this time. 0400: Pt reassessed. No new changes noted at this time. 1512: Pt's potassium 3.3, verbal order for 40meq of oral potassium    Bedside and Verbal shift change report given to Gayle Maxwell RN (oncoming nurse) by [facility], RN (offgoing nurse). Report included the following information SBAR, ED Summary, Procedure Summary, MAR and Cardiac Rhythm NSR.

## 2020-05-08 LAB
ANION GAP SERPL CALC-SCNC: 5 MMOL/L (ref 5–15)
BASOPHILS # BLD: 0 K/UL (ref 0–0.1)
BASOPHILS NFR BLD: 1 % (ref 0–1)
BUN SERPL-MCNC: 3 MG/DL (ref 6–20)
BUN/CREAT SERPL: 5 (ref 12–20)
CALCIUM SERPL-MCNC: 8.3 MG/DL (ref 8.5–10.1)
CHLORIDE SERPL-SCNC: 108 MMOL/L (ref 97–108)
CO2 SERPL-SCNC: 26 MMOL/L (ref 21–32)
CREAT SERPL-MCNC: 0.65 MG/DL (ref 0.7–1.3)
DIFFERENTIAL METHOD BLD: ABNORMAL
EOSINOPHIL # BLD: 0.2 K/UL (ref 0–0.4)
EOSINOPHIL NFR BLD: 3 % (ref 0–7)
ERYTHROCYTE [DISTWIDTH] IN BLOOD BY AUTOMATED COUNT: 14.3 % (ref 11.5–14.5)
GLUCOSE SERPL-MCNC: 112 MG/DL (ref 65–100)
HCT VFR BLD AUTO: 36.6 % (ref 36.6–50.3)
HGB BLD-MCNC: 12.5 G/DL (ref 12.1–17)
IMM GRANULOCYTES # BLD AUTO: 0 K/UL (ref 0–0.04)
IMM GRANULOCYTES NFR BLD AUTO: 1 % (ref 0–0.5)
LYMPHOCYTES # BLD: 1.3 K/UL (ref 0.8–3.5)
LYMPHOCYTES NFR BLD: 21 % (ref 12–49)
MAGNESIUM SERPL-MCNC: 2 MG/DL (ref 1.6–2.4)
MCH RBC QN AUTO: 32.1 PG (ref 26–34)
MCHC RBC AUTO-ENTMCNC: 34.2 G/DL (ref 30–36.5)
MCV RBC AUTO: 94.1 FL (ref 80–99)
MONOCYTES # BLD: 0.7 K/UL (ref 0–1)
MONOCYTES NFR BLD: 11 % (ref 5–13)
NEUTS SEG # BLD: 3.9 K/UL (ref 1.8–8)
NEUTS SEG NFR BLD: 63 % (ref 32–75)
NRBC # BLD: 0 K/UL (ref 0–0.01)
NRBC BLD-RTO: 0 PER 100 WBC
PHOSPHATE SERPL-MCNC: 4.5 MG/DL (ref 2.6–4.7)
PLATELET # BLD AUTO: 157 K/UL (ref 150–400)
PMV BLD AUTO: 11.4 FL (ref 8.9–12.9)
POTASSIUM SERPL-SCNC: 3.7 MMOL/L (ref 3.5–5.1)
RBC # BLD AUTO: 3.89 M/UL (ref 4.1–5.7)
SODIUM SERPL-SCNC: 139 MMOL/L (ref 136–145)
WBC # BLD AUTO: 6 K/UL (ref 4.1–11.1)

## 2020-05-08 PROCEDURE — 36415 COLL VENOUS BLD VENIPUNCTURE: CPT

## 2020-05-08 PROCEDURE — 74011250636 HC RX REV CODE- 250/636: Performed by: INTERNAL MEDICINE

## 2020-05-08 PROCEDURE — 74011250636 HC RX REV CODE- 250/636: Performed by: EMERGENCY MEDICINE

## 2020-05-08 PROCEDURE — 85025 COMPLETE CBC W/AUTO DIFF WBC: CPT

## 2020-05-08 PROCEDURE — 93005 ELECTROCARDIOGRAM TRACING: CPT

## 2020-05-08 PROCEDURE — 80048 BASIC METABOLIC PNL TOTAL CA: CPT

## 2020-05-08 PROCEDURE — 74011000258 HC RX REV CODE- 258: Performed by: INTERNAL MEDICINE

## 2020-05-08 PROCEDURE — 65620000000 HC RM CCU GENERAL

## 2020-05-08 PROCEDURE — 74011250637 HC RX REV CODE- 250/637: Performed by: EMERGENCY MEDICINE

## 2020-05-08 PROCEDURE — 83735 ASSAY OF MAGNESIUM: CPT

## 2020-05-08 PROCEDURE — 74011250637 HC RX REV CODE- 250/637: Performed by: INTERNAL MEDICINE

## 2020-05-08 PROCEDURE — 74011000250 HC RX REV CODE- 250: Performed by: INTERNAL MEDICINE

## 2020-05-08 PROCEDURE — 84100 ASSAY OF PHOSPHORUS: CPT

## 2020-05-08 RX ADMIN — ONDANSETRON 4 MG: 2 INJECTION INTRAMUSCULAR; INTRAVENOUS at 22:20

## 2020-05-08 RX ADMIN — DEXMEDETOMIDINE HYDROCHLORIDE 0.5 MCG/KG/HR: 100 INJECTION, SOLUTION, CONCENTRATE INTRAVENOUS at 02:04

## 2020-05-08 RX ADMIN — DEXMEDETOMIDINE HYDROCHLORIDE 0.3 MCG/KG/HR: 100 INJECTION, SOLUTION, CONCENTRATE INTRAVENOUS at 09:51

## 2020-05-08 RX ADMIN — Medication 100 MG: at 08:16

## 2020-05-08 RX ADMIN — CHLORDIAZEPOXIDE HYDROCHLORIDE 50 MG: 25 CAPSULE ORAL at 21:10

## 2020-05-08 RX ADMIN — GABAPENTIN 300 MG: 300 CAPSULE ORAL at 08:16

## 2020-05-08 RX ADMIN — CHLORDIAZEPOXIDE HYDROCHLORIDE 50 MG: 25 CAPSULE ORAL at 13:17

## 2020-05-08 RX ADMIN — POTASSIUM & SODIUM PHOSPHATES POWDER PACK 280-160-250 MG 1 PACKET: 280-160-250 PACK at 08:16

## 2020-05-08 RX ADMIN — LORAZEPAM 2 MG: 2 INJECTION INTRAMUSCULAR; INTRAVENOUS at 23:43

## 2020-05-08 RX ADMIN — GABAPENTIN 300 MG: 300 CAPSULE ORAL at 21:10

## 2020-05-08 RX ADMIN — SODIUM CHLORIDE 468 MG: 900 INJECTION, SOLUTION INTRAVENOUS at 02:17

## 2020-05-08 RX ADMIN — HALOPERIDOL LACTATE 5 MG: 5 INJECTION, SOLUTION INTRAMUSCULAR at 22:20

## 2020-05-08 RX ADMIN — ENOXAPARIN SODIUM 40 MG: 40 INJECTION SUBCUTANEOUS at 08:15

## 2020-05-08 RX ADMIN — Medication 10 ML: at 13:17

## 2020-05-08 RX ADMIN — HALOPERIDOL LACTATE 5 MG: 5 INJECTION, SOLUTION INTRAMUSCULAR at 16:59

## 2020-05-08 RX ADMIN — Medication 10 ML: at 21:11

## 2020-05-08 RX ADMIN — LORAZEPAM 2 MG: 2 INJECTION INTRAMUSCULAR; INTRAVENOUS at 21:19

## 2020-05-08 RX ADMIN — GABAPENTIN 300 MG: 300 CAPSULE ORAL at 16:59

## 2020-05-08 RX ADMIN — FOLIC ACID 1 MG: 1 TABLET ORAL at 08:16

## 2020-05-08 RX ADMIN — LORAZEPAM 2 MG: 2 INJECTION INTRAMUSCULAR; INTRAVENOUS at 19:51

## 2020-05-08 NOTE — PROGRESS NOTES
1930: Report received from [facility] AT [place]  2100: Pt hallucinating and attempting to get out of bed despite continuous redirection. PRN given per MESFIN CAMPBELL at bedside, see mar for meds ordered. Bed alarm on.  0330: AM labs drawn and sent. 0730: Bedside shift change report given to FORREST Chicas (oncoming nurse) by Christy Mcconnell (offgoing nurse). Report included the following information SBAR, Kardex, Intake/Output and MAR.

## 2020-05-08 NOTE — PROGRESS NOTES
39M admit 5/4 with s/s EtOH w/d. He'd been trying to quit. He has a h/o EtOH w/d in the past as well. He was admitted to the hospitalist [facility], but required escalating amounts of ativan, then valium. [facility] was asked to eval for dex gtt. Currently, pt is incoherent. He will occasionally follow commands, but is clearly confused, impulsive, intermittently agitated.        Interval changes-still on Precedex, quite agitated overnight-received another 10 mg/kg of phenobarb      lPatient Vitals for the past 24 hrs:   Temp Pulse Resp BP SpO2   05/08/20 1600 98.3 °F (36.8 °C) 60 14 132/73 97 %   05/08/20 1500  78 24 148/89 98 %   05/08/20 1400  76 17 (!) 132/93 97 %   05/08/20 1300  76 14 (!) 144/99 98 %   05/08/20 1200 98.4 °F (36.9 °C) 66 15 123/87 99 %   05/08/20 1100  68 16 132/89 95 %   05/08/20 1000  88 11 116/61 97 %   05/08/20 0900  60 14 107/79 100 %   05/08/20 0800 98.2 °F (36.8 °C) (!) 58 14 105/74 97 %   05/08/20 0700  67 17 120/78 98 %   05/08/20 0600  61 13 114/84 98 %   05/08/20 0500  60 12 118/82 100 %   05/08/20 0400 98.3 °F (36.8 °C) 61 12 119/78 100 %   05/08/20 0300  61 12 (!) 129/92 100 %   05/08/20 0200  79 17 129/88 98 %   05/08/20 0100  68 12 130/78 99 %   05/08/20 0000 98 °F (36.7 °C) 66 14 140/89 98 %   05/07/20 2300  75 13  97 %   05/07/20 2200  81 23 135/80 99 %   05/07/20 2100  84 17 (!) 136/116 99 %   05/07/20 2000 98.2 °F (36.8 °C) 79 16 (!) 139/95 98 %   05/07/20 1900  72  (!) 138/96 90 %   05/07/20 1800  92 19 (!) 134/92 96 %   05/07/20 1700  83   91 %     Physical exam  General-NAD  Neuro-quite sleepy today  Cardiac-RRR  Lungs-clear  Abdomen-soft, nontender, nondistended  Extremities-warm    Recent Results (from the past 24 hour(s))   CBC WITH AUTOMATED DIFF    Collection Time: 05/08/20  3:39 AM   Result Value Ref Range    WBC 6.0 4.1 - 11.1 K/uL    RBC 3.89 (L) 4.10 - 5.70 M/uL    HGB 12.5 12.1 - 17.0 g/dL    HCT 36.6 36.6 - 50.3 %    MCV 94.1 80.0 - 99.0 FL    MCH 32.1 26.0 - 34.0 PG    MCHC 34.2 30.0 - 36.5 g/dL    RDW 14.3 11.5 - 14.5 %    PLATELET 819 824 - 218 K/uL    MPV 11.4 8.9 - 12.9 FL    NRBC 0.0 0  WBC    ABSOLUTE NRBC 0.00 0.00 - 0.01 K/uL    NEUTROPHILS 63 32 - 75 %    LYMPHOCYTES 21 12 - 49 %    MONOCYTES 11 5 - 13 %    EOSINOPHILS 3 0 - 7 %    BASOPHILS 1 0 - 1 %    IMMATURE GRANULOCYTES 1 (H) 0.0 - 0.5 %    ABS. NEUTROPHILS 3.9 1.8 - 8.0 K/UL    ABS. LYMPHOCYTES 1.3 0.8 - 3.5 K/UL    ABS. MONOCYTES 0.7 0.0 - 1.0 K/UL    ABS. EOSINOPHILS 0.2 0.0 - 0.4 K/UL    ABS. BASOPHILS 0.0 0.0 - 0.1 K/UL    ABS. IMM.  GRANS. 0.0 0.00 - 0.04 K/UL    DF AUTOMATED     METABOLIC PANEL, BASIC    Collection Time: 05/08/20  3:39 AM   Result Value Ref Range    Sodium 139 136 - 145 mmol/L    Potassium 3.7 3.5 - 5.1 mmol/L    Chloride 108 97 - 108 mmol/L    CO2 26 21 - 32 mmol/L    Anion gap 5 5 - 15 mmol/L    Glucose 112 (H) 65 - 100 mg/dL    BUN 3 (L) 6 - 20 MG/DL    Creatinine 0.65 (L) 0.70 - 1.30 MG/DL    BUN/Creatinine ratio 5 (L) 12 - 20      GFR est AA >60 >60 ml/min/1.73m2    GFR est non-AA >60 >60 ml/min/1.73m2    Calcium 8.3 (L) 8.5 - 10.1 MG/DL   MAGNESIUM    Collection Time: 05/08/20  3:39 AM   Result Value Ref Range    Magnesium 2.0 1.6 - 2.4 mg/dL   PHOSPHORUS    Collection Time: 05/08/20  3:39 AM   Result Value Ref Range    Phosphorus 4.5 2.6 - 4.7 MG/DL       Imaging reviewed    Assessment  Severe alcohol withdrawal/delirium tremens    Plan  Neuro-serial neuro checks, continue alcohol withdrawal protocol, agitation management with benzodiazepines, phenobarbital and Precedex as needed,continue gabapentin, continue thiamine and folate supplementation    Cardiac-continue hemodynamic monitoring, map goal greater than 65    Respiratory-supplemental oxygen as needed, saturation goal greater than 90%    GI-diet as tolerated depending on mental status, ensure bowel movements    Renal-monitor urine output, daily BMPs, correct electrolyte derangements as needed    Hematology-Daily CBCs, DVT prophylaxis with Lovenox    ID-monitor off antibiotics for now    Endo-keep glucose less than 180 with subcutaneous insulin/dextrose as needed    Critical care time-40 minutes

## 2020-05-08 NOTE — PROGRESS NOTES
0730 Bedside shift change report given to Aayush Goznalez (oncoming nurse) by Shivam Payne (offgoing nurse). Report included the following information SBAR, Intake/Output, MAR, Recent Results and Cardiac Rhythm NSR.     0951 Pt agitated and attempting to get out of bed. Precedex gtt increased. See MAR for further titration details. 1630 Intensivist at bedside. Plan to give PRN Haldol and Ativan and wean off Precedex as tolerated. 1659 PRN Haldol given and Precedex gtt decreased. 95 Judge Gilberto Yuan stopped    1930 Bedside shift change report given to ICU (oncoming nurse) by Aayush Gonzalez (offgoing nurse). Report included the following information SBAR, Intake/Output, MAR, Recent Results and Cardiac Rhythm NSR.

## 2020-05-09 LAB
AMMONIA PLAS-SCNC: 13 UMOL/L
ANION GAP SERPL CALC-SCNC: 5 MMOL/L (ref 5–15)
ATRIAL RATE: 90 BPM
BASOPHILS # BLD: 0 K/UL (ref 0–0.1)
BASOPHILS NFR BLD: 1 % (ref 0–1)
BUN SERPL-MCNC: 3 MG/DL (ref 6–20)
BUN/CREAT SERPL: 5 (ref 12–20)
CALCIUM SERPL-MCNC: 9 MG/DL (ref 8.5–10.1)
CALCULATED P AXIS, ECG09: 17 DEGREES
CALCULATED R AXIS, ECG10: 10 DEGREES
CALCULATED T AXIS, ECG11: 21 DEGREES
CHLORIDE SERPL-SCNC: 107 MMOL/L (ref 97–108)
CO2 SERPL-SCNC: 28 MMOL/L (ref 21–32)
CREAT SERPL-MCNC: 0.66 MG/DL (ref 0.7–1.3)
DIAGNOSIS, 93000: NORMAL
DIFFERENTIAL METHOD BLD: ABNORMAL
EOSINOPHIL # BLD: 0.1 K/UL (ref 0–0.4)
EOSINOPHIL NFR BLD: 2 % (ref 0–7)
ERYTHROCYTE [DISTWIDTH] IN BLOOD BY AUTOMATED COUNT: 14.6 % (ref 11.5–14.5)
GLUCOSE SERPL-MCNC: 105 MG/DL (ref 65–100)
HCT VFR BLD AUTO: 39.3 % (ref 36.6–50.3)
HGB BLD-MCNC: 12.8 G/DL (ref 12.1–17)
IMM GRANULOCYTES # BLD AUTO: 0 K/UL (ref 0–0.04)
IMM GRANULOCYTES NFR BLD AUTO: 1 % (ref 0–0.5)
LYMPHOCYTES # BLD: 1.3 K/UL (ref 0.8–3.5)
LYMPHOCYTES NFR BLD: 20 % (ref 12–49)
MAGNESIUM SERPL-MCNC: 2.1 MG/DL (ref 1.6–2.4)
MCH RBC QN AUTO: 30.5 PG (ref 26–34)
MCHC RBC AUTO-ENTMCNC: 32.6 G/DL (ref 30–36.5)
MCV RBC AUTO: 93.6 FL (ref 80–99)
MONOCYTES # BLD: 0.7 K/UL (ref 0–1)
MONOCYTES NFR BLD: 11 % (ref 5–13)
NEUTS SEG # BLD: 4.3 K/UL (ref 1.8–8)
NEUTS SEG NFR BLD: 65 % (ref 32–75)
NRBC # BLD: 0 K/UL (ref 0–0.01)
NRBC BLD-RTO: 0 PER 100 WBC
P-R INTERVAL, ECG05: 130 MS
PHOSPHATE SERPL-MCNC: 4.7 MG/DL (ref 2.6–4.7)
PLATELET # BLD AUTO: 144 K/UL (ref 150–400)
PMV BLD AUTO: 10.8 FL (ref 8.9–12.9)
POTASSIUM SERPL-SCNC: 3.1 MMOL/L (ref 3.5–5.1)
Q-T INTERVAL, ECG07: 362 MS
QRS DURATION, ECG06: 80 MS
QTC CALCULATION (BEZET), ECG08: 442 MS
RBC # BLD AUTO: 4.2 M/UL (ref 4.1–5.7)
SODIUM SERPL-SCNC: 140 MMOL/L (ref 136–145)
VENTRICULAR RATE, ECG03: 90 BPM
WBC # BLD AUTO: 6.5 K/UL (ref 4.1–11.1)

## 2020-05-09 PROCEDURE — 74011250636 HC RX REV CODE- 250/636: Performed by: INTERNAL MEDICINE

## 2020-05-09 PROCEDURE — 80048 BASIC METABOLIC PNL TOTAL CA: CPT

## 2020-05-09 PROCEDURE — 74011250637 HC RX REV CODE- 250/637: Performed by: INTERNAL MEDICINE

## 2020-05-09 PROCEDURE — 36415 COLL VENOUS BLD VENIPUNCTURE: CPT

## 2020-05-09 PROCEDURE — 74011250637 HC RX REV CODE- 250/637: Performed by: EMERGENCY MEDICINE

## 2020-05-09 PROCEDURE — 82140 ASSAY OF AMMONIA: CPT

## 2020-05-09 PROCEDURE — 74011000258 HC RX REV CODE- 258: Performed by: INTERNAL MEDICINE

## 2020-05-09 PROCEDURE — 83735 ASSAY OF MAGNESIUM: CPT

## 2020-05-09 PROCEDURE — 74011250636 HC RX REV CODE- 250/636: Performed by: EMERGENCY MEDICINE

## 2020-05-09 PROCEDURE — 84100 ASSAY OF PHOSPHORUS: CPT

## 2020-05-09 PROCEDURE — 74011000250 HC RX REV CODE- 250: Performed by: INTERNAL MEDICINE

## 2020-05-09 PROCEDURE — 65660000000 HC RM CCU STEPDOWN

## 2020-05-09 PROCEDURE — 85025 COMPLETE CBC W/AUTO DIFF WBC: CPT

## 2020-05-09 RX ORDER — POTASSIUM CHLORIDE 750 MG/1
40 TABLET, FILM COATED, EXTENDED RELEASE ORAL
Status: COMPLETED | OUTPATIENT
Start: 2020-05-09 | End: 2020-05-09

## 2020-05-09 RX ORDER — LORAZEPAM 2 MG/ML
2 INJECTION INTRAMUSCULAR
Status: COMPLETED | OUTPATIENT
Start: 2020-05-09 | End: 2020-05-09

## 2020-05-09 RX ORDER — NITROGLYCERIN 400 UG/1
1 SPRAY ORAL
Status: DISCONTINUED | OUTPATIENT
Start: 2020-05-09 | End: 2020-05-12 | Stop reason: HOSPADM

## 2020-05-09 RX ADMIN — ENOXAPARIN SODIUM 40 MG: 40 INJECTION SUBCUTANEOUS at 08:01

## 2020-05-09 RX ADMIN — FOLIC ACID 1 MG: 1 TABLET ORAL at 08:00

## 2020-05-09 RX ADMIN — Medication 10 ML: at 15:31

## 2020-05-09 RX ADMIN — HALOPERIDOL LACTATE 5 MG: 5 INJECTION, SOLUTION INTRAMUSCULAR at 07:57

## 2020-05-09 RX ADMIN — Medication 10 ML: at 06:54

## 2020-05-09 RX ADMIN — Medication 10 ML: at 22:00

## 2020-05-09 RX ADMIN — GABAPENTIN 300 MG: 300 CAPSULE ORAL at 21:18

## 2020-05-09 RX ADMIN — Medication 100 MG: at 08:00

## 2020-05-09 RX ADMIN — GABAPENTIN 300 MG: 300 CAPSULE ORAL at 15:31

## 2020-05-09 RX ADMIN — NITROGLYCERIN 1 SPRAY: 400 SPRAY ORAL at 00:18

## 2020-05-09 RX ADMIN — LORAZEPAM 2 MG: 2 INJECTION INTRAMUSCULAR; INTRAVENOUS at 00:28

## 2020-05-09 RX ADMIN — POTASSIUM CHLORIDE 40 MEQ: 750 TABLET, FILM COATED, EXTENDED RELEASE ORAL at 08:00

## 2020-05-09 RX ADMIN — ONDANSETRON 4 MG: 2 INJECTION INTRAMUSCULAR; INTRAVENOUS at 13:02

## 2020-05-09 RX ADMIN — DEXMEDETOMIDINE HYDROCHLORIDE 0.6 MCG/KG/HR: 100 INJECTION, SOLUTION, CONCENTRATE INTRAVENOUS at 02:28

## 2020-05-09 RX ADMIN — GABAPENTIN 300 MG: 300 CAPSULE ORAL at 08:00

## 2020-05-09 NOTE — PROGRESS NOTES
1930:Bedside and Verbal shift change report given to Myke Johnston RN (oncoming nurse) by Christia Schirmer, RN (offgoing nurse). Report included the following information SBAR, Kardex, Intake/Output, MAR, Accordion, Recent Results, Med Rec Status and Cardiac Rhythm NSR.     2119: Pt attempting to get OOB. Pt experiencing visual hallucinations and increasingly restless. PRN ativan given. 2220: PRN haldol given, patient's QTc 0.41  2345: Pt states he has \"crushing chest pain\" 12 lead EKG obtained. Dr. Molly Pablo with new PRN Nitroglycerine sublingual spray. 0000: Pt denies chest pain at this time. Nitroglycerin will be given per MD order. Will monitor patient closely. 0015: Dr. Molly Pablo at bedside, pt remains agitated and restless in bed. Precedex gtt increased. New order for one time Ativan 2mg.

## 2020-05-09 NOTE — PROGRESS NOTES
0730 Bedside shift change report given to Monique Ramirez (oncoming nurse) by Kaela Bhagat (offgoing nurse). Report included the following information SBAR, Intake/Output, Recent Results and Cardiac Rhythm NSR.     0757 Precedex gtt stopped  0800 PIV inserted R wrist  1200 Reassessment performed. Pt Alert to silf, situation, and place. Disoriented to time. Pt calm and cooperative. Plan to move pt to telemetry bed this afternoon per Dr. Lorie Horta:    [facility]  being [facility] to [facility](unit) for routine progression of care       Report consisted of patients Situation, Background, Assessment and   Recommendations(SBAR). Information from the following report(s) SBAR, Intake/Output, MAR, Recent Results and Cardiac Rhythm NSR was reviewed with the receiving nurse. Lines:   Peripheral IV 05/08/20 Right Antecubital (Active)   Site Assessment Clean, dry, & intact 5/9/2020 12:00 PM   Phlebitis Assessment 0 5/9/2020 12:00 PM   Infiltration Assessment 0 5/9/2020 12:00 PM   Dressing Status Clean, dry, & intact 5/9/2020 12:00 PM   Dressing Type Transparent 5/9/2020 12:00 PM   Hub Color/Line Status Pink 5/9/2020 12:00 PM   Action Taken Open ports on tubing capped 5/9/2020 12:00 PM   Alcohol Cap Used Yes 5/9/2020 12:00 PM       Peripheral IV 05/09/20 Right (Active)   Site Assessment Clean, dry, & intact 5/9/2020 12:00 PM   Phlebitis Assessment 0 5/9/2020 12:00 PM   Infiltration Assessment 0 5/9/2020 12:00 PM   Dressing Status Clean, dry, & intact 5/9/2020 12:00 PM   Dressing Type Transparent 5/9/2020 12:00 PM   Hub Color/Line Status Blue 5/9/2020 12:00 PM   Action Taken Open ports on tubing capped 5/9/2020 12:00 PM   Alcohol Cap Used Yes 5/9/2020 12:00 PM        Opportunity for questions and clarification was provided.       Patient transported with:   Monitor  Registered Nurse

## 2020-05-09 NOTE — PROGRESS NOTES
[facility]    ICU TEAM Progress Note    Name: Leelee Tipton. : 1980   MRN: [id number]   Date: 2020      Assessment:     ICU Problems and plan :    1. Agitated delirium day #6 ETOH withdrawal - resolving if sundowns would use haldol    - haldol prn    - day/night cycle    - OOB if able   2. ETOH abuse  3. H/O HTN    - add back meds as needed       Subjective:   Progress Note: 2020      Reason for ICU Admission: ETOH withdrawal      Overnight Events: Pt admitted with ETOH withdrawal to floor Subsequently developed DTs requiring precedex and CIWA as well as phenobarbitol. Precedex has been off all day, he is day 6 now = no longer in ETOH withdrawal. Mild confusion is from meds I have stopped all benzos and have not had to use any meds today He is clear awake pleasantly confused ok for floor     POD:* No surgery found *    S/P:     Active Problem List:     Problem List  Date Reviewed: 5/3/2020          Codes Class    * (Principal) Alcohol withdrawal syndrome ([facility] ICD-10-CM: F10.239  ICD-9-CM: 291.81         Insomnia ICD-10-CM: G47.00  ICD-9-CM: 780.52         Chest pain ICD-10-CM: R07.9  ICD-9-CM: 786.50         Elevated liver enzymes ICD-10-CM: R74.8  ICD-9-CM: 790.5         Previous back surgery ICD-10-CM: Z98.890  ICD-9-CM: V45.89         Other and unspecified hyperlipidemia ICD-10-CM: E78.5  ICD-9-CM: 272.4         Obesity ICD-10-CM: E66.9  ICD-9-CM: 278.00         Nicotine dependence ICD-10-CM: F17.200  ICD-9-CM: 305.1         Essential hypertension, benign ICD-10-CM: I10  ICD-9-CM: 401.1         Coarse tremors ICD-10-CM: G25.2  ICD-9-CM: 781.0         Anxiety ICD-10-CM: F41.9  ICD-9-CM: 300.00         Back pain ICD-10-CM: M54.9  ICD-9-CM: 724.5               Past Medical History:      has a past medical history of Alcoholism ([address] Back pain, Chronic pain, Concussion, H/O spinal fusion, Hypertension, and Insomnia.     Past Surgical History:      has a past surgical history that includes hx orthopaedic. Home Medications:     Prior to Admission medications    Medication Sig Start Date End Date Taking? Authorizing Provider   MULTIVITAMIN PO Take  by mouth. Rony Son MD   lidocaine (LIDODERM) 5 % 1 Patch by TransDERmal route every twenty-four (24) hours. Apply patch to the affected area for 12 hours a day and remove for 12 hours a day. Indications: pain 20   Iesha Larsen MD   ibuprofen (MOTRIN) 600 mg tablet Take 1 Tab by mouth every six (6) hours as needed for Pain. Indications: minor musculoskeletal injury, pain 20   Iesha Larsen MD   diclofenac (VOLTAREN) 1 % gel APPLY 4 G TO AFFECTED AREA FOUR (4) TIMES DAILY. 18   Joey Chicas MD   LORazepam (ATIVAN) 0.5 mg tablet Take 1 Tab by mouth daily as needed for Anxiety. 11/15/17   Joey Chicas MD   QUEtiapine (SEROQUEL) 25 mg tablet Take 1 Tab by mouth two (2) times a day. 17   Joey Chicas MD   gabapentin (NEURONTIN) 300 mg capsule TAKE 3 CAPSULES BY MOUTH TWICE A DAY  Patient taking differently: TAKE 3 CAPSULES BY MOUTH THREE TIMES DAILY 10/25/17   Joey Chicas MD   traZODone (DESYREL) 100 mg tablet Take 1 Tab by mouth nightly. 17   Joey Chicas MD   cholecalciferol, VITAMIN D3, (VITAMIN D3) 5,000 unit tab tablet Take 1 Tab by mouth daily.  17   Eugenio Haddad MD       Allergies/Social/Family History:     No Known Allergies   Social History     Tobacco Use    Smoking status: Former Smoker     Packs/day: 1.00     Years: 10.00     Pack years: 10.00     Types: Cigarettes     Last attempt to quit: 2014     Years since quittin.0    Smokeless tobacco: Current User   Substance Use Topics    Alcohol use: No      Family History   Problem Relation Age of Onset    Diabetes Mother     Hypertension Mother     Thyroid Disease Mother         hypothyroidism    Heart Attack Mother         age 64-smoker    [facility] Arthritis-osteo Mother     Asthma Mother     Heart Disease Mother     Elevated Lipids Father     Heart Disease Father     Hypertension Father     Diabetes Father     Cancer Father         prostate    Heart Attack Father         in 54's    Alcohol abuse Father     Arthritis-osteo Father     Psychiatric Disorder Father        Review of Systems:     unable     Objective:   Vital Signs:  Visit Vitals  /79   Pulse (!) 104   Temp 98.3 °F (36.8 °C)   Resp 22   Ht 6' 1\" (1.854 m)   Wt 90.6 kg (199 lb 11.8 oz)   SpO2 93%   BMI 26.35 kg/m²    O2 Flow Rate (L/min): 2 l/min O2 Device: Room air Temp (24hrs), Av.3 °F (36.8 °C), Min:98.1 °F (36.7 °C), Max:98.8 °F (37.1 °C)           Intake/Output:     Intake/Output Summary (Last 24 hours) at 2020 1438  Last data filed at 2020 1118  Gross per 24 hour   Intake 145.71 ml   Output 1550 ml   Net -1404.29 ml       Physical Exam:    General:  Confused    Eyes:  Sclera anicteric. Pupils equally round and reactive to light. Neck: Supple   Lungs:   Clear to auscultation bilaterally, good effort   CV:  Regular rate and rhythm,no murmur, click, rub or gallop   Abdomen:   Soft, non-tender. bowel sounds normal. non-distended   Extremities: No cyanosis or edema   Skin: Skin color, texture, turgor normal. no acute rash or lesions                       LABS AND  DATA: Personally reviewed  Recent Labs     20  0418 20  0339   WBC 6.5 6.0   HGB 12.8 12.5   HCT 39.3 36.6   * 157     Recent Labs     20  0418 20  0339    139   K 3.1* 3.7    108   CO2 28 26   BUN 3* 3*   CREA 0.66* 0.65*   * 112*   CA 9.0 8.3*   MG 2.1 2.0   PHOS 4.7 4.5     No results for input(s): SGOT, GPT, AP, TBIL, TP, ALB, GLOB, AML, LPSE in the last 72 hours. No lab exists for component: AMYP  No results for input(s): INR, PTP, APTT, INREXT, INREXT in the last 72 hours. No results for input(s): PHI, PCO2I, PO2I, FIO2I in the last 72 hours.   No results for input(s): CPK, CKMB, TROIQ, BNPP in the last 72 hours.     Hemodynamics:   PAP:   CO:     Wedge:   CI:     CVP:    SVR:       PVR:       Ventilator Settings:  Mode Rate Tidal Volume Pressure FiO2 PEEP                    Peak airway pressure:      Minute ventilation:          MEDS: Reviewed    Chest X-Ray:  CXR Results  (Last 48 hours)    None        ABCDEF Bundle/Checklist Completed:  Yes    SPECIAL EQUIPMENT  None    DISPOSITION  To Floor     Eneida Way MD

## 2020-05-09 NOTE — PROGRESS NOTES
Problem: Falls - Risk of  Goal: *Absence of Falls  Description: Document Clydene Bone Fall Risk and appropriate interventions in the flowsheet. Outcome: Progressing Towards Goal  Note: Fall Risk Interventions:  Mobility Interventions: Bed/chair exit alarm, Communicate number of staff needed for ambulation/transfer, Patient to call before getting OOB    Mentation Interventions: Adequate sleep, hydration, pain control, Door open when patient unattended, Evaluate medications/consider consulting pharmacy, More frequent rounding, Reorient patient, Room close to nurse's station, Toileting rounds, Update white board    Medication Interventions: Assess postural VS orthostatic hypotension, Bed/chair exit alarm, Evaluate medications/consider consulting pharmacy, Patient to call before getting OOB, Teach patient to arise slowly    Elimination Interventions: Bed/chair exit alarm, Call light in reach, Patient to call for help with toileting needs, Toileting schedule/hourly rounds, Urinal in reach              Problem: Pressure Injury - Risk of  Goal: *Prevention of pressure injury  Description: Document David Scale and appropriate interventions in the flowsheet. Outcome: Progressing Towards Goal  Note: Pressure Injury Interventions:  Sensory Interventions: Assess changes in LOC, Assess need for specialty bed, Avoid rigorous massage over bony prominences, Check visual cues for pain, Discuss PT/OT consult with provider, Float heels, Keep linens dry and wrinkle-free, Maintain/enhance activity level, Monitor skin under medical devices, Pad between skin to skin, Minimize linen layers, Pressure redistribution bed/mattress (bed type), Turn and reposition approx.  every two hours (pillows and wedges if needed)    Moisture Interventions: Absorbent underpads, Apply protective barrier, creams and emollients, Assess need for specialty bed, Check for incontinence Q2 hours and as needed, Internal/External urinary devices, Limit adult briefs, Maintain skin hydration (lotion/cream), Minimize layers, Moisture barrier, Offer toileting Q_hr    Activity Interventions: Assess need for specialty bed, Increase time out of bed, Pressure redistribution bed/mattress(bed type), PT/OT evaluation    Mobility Interventions: Assess need for specialty bed, Float heels, HOB 30 degrees or less, Pressure redistribution bed/mattress (bed type), PT/OT evaluation, Turn and reposition approx.  every two hours(pillow and wedges)    Nutrition Interventions: Document food/fluid/supplement intake, Offer support with meals,snacks and hydration    Friction and Shear Interventions: Apply protective barrier, creams and emollients, Feet elevated on foot rest, Lift sheet, Lift team/patient mobility team, HOB 30 degrees or less, Minimize layers                Problem: Pain  Goal: *Control of Pain  Outcome: Progressing Towards Goal  Goal: *PALLIATIVE CARE:  Alleviation of Pain  Outcome: Progressing Towards Goal     Problem: Alcohol Withdrawal  Goal: *STG: Participates in treatment plan  Outcome: Progressing Towards Goal  Goal: *STG: Remains safe in hospital  Outcome: Progressing Towards Goal  Goal: *STG: Seeks staff when symptoms of withdrawal increase  Outcome: Progressing Towards Goal  Goal: *STG: Complies with medication therapy  Outcome: Progressing Towards Goal  Goal: *STG: Will identify negative impact of chemical dependency including the use of tobacco, alcohol, and other substances  Outcome: Progressing Towards Goal  Goal: *STG: Verbalizes abstinence as an achievable goal  Outcome: Progressing Towards Goal  Goal: *STG: Agrees to participate in outpatient after care program to support ongoing mental health  Outcome: Progressing Towards Goal  Goal: *STG: Able to indentify relapse triggers including interpersonal/social and familial factors  Outcome: Progressing Towards Goal  Goal: *STG: Identify lifestyle changes to support long term sobriety such as vocation, employment, education, and legal issues  Outcome: Progressing Towards Goal  Goal: *STG: Maintains appropriate nutrition and hydration  Outcome: Progressing Towards Goal  Goal: *STG: Vital signs within defined limits  Outcome: Progressing Towards Goal  Goal: *STG/LTG: Relapse prevention plan in place to include housing/aftercare, leisure activities, and spirituality  Outcome: Progressing Towards Goal     Problem: Delirium Treatment  Goal: *Level of consciousness restored to baseline  Outcome: Progressing Towards Goal  Goal: *Absence of falls  Outcome: Progressing Towards Goal  Goal: *Cognitive status will be restored to baseline  Outcome: Progressing Towards Goal

## 2020-05-09 NOTE — PROGRESS NOTES
[facility] Adult  Hospitalist Group     ICU Transfer/Accept Summary     [place] [address] [place]  DATE OF TRANSFER: 5/9/2020       PATIENT ID: Payam Mckeon MRN: [id number]   YOB: 1980    PRIMARY CARE PROVIDER: None   DATE OF ADMISSION: 5/3/2020  5:21 PM    ATTENDING PHYSICIAN: Gayle Robledo MD  CONSULTATIONS:   None    PROCEDURES/SURGERIES:   * No surgery found *    REASON FOR ADMISSION: Alcohol withdrawal syndrome [facility]     HOSPITAL PROBLEM LIST:  Patient Active Problem List   Diagnosis Code    Anxiety F41.9    Back pain M54.9    Coarse tremors G25.2    Essential hypertension, benign I10    Nicotine dependence F17.200    Obesity E66.9    Other and unspecified hyperlipidemia E78.5    Previous back surgery Z98.890    Elevated liver enzymes R74.8    Chest pain R07.9    Insomnia G47.00    Alcohol withdrawal syndrome (HCC) F10.239         Brief HPI and Hospital Course:      Patient initially admitted on 5/4, had worsening widrawal syndrome requiring transfer to ICU. Now stable for transfer out of the ICU. Seen and examined at the bedside. Patient not on COVID-19 precautions  Weaned off Precedex infusion today. Stable vitals. Mentation remains very slow. Not agitated. Denies pain or dyspnea.      Assessment and Plan:    Delirium tremens, alcohol withdrawal, resolving.   -Still requiring IV therapies but off Precedex  -Monitor CIWA, neuro status  -Telemetry  -Increase activity as tolerated    Hypokalemia - replete and monitor    HTN - resume home meds as indicated    DVT prophylaxis  - Lovenox    Dispo: 1-2 days based on further progress         PHYSICAL EXAMINATION:  Visit Vitals  /77   Pulse 100   Temp 98.3 °F (36.8 °C)   Resp (!) 7   Ht 6' 1\" (1.854 m)   Wt 90.6 kg (199 lb 11.8 oz)   SpO2 94%   BMI 26.35 kg/m²       General:          A bit drowsy and restless, cooperative, no acute distress  HEENT:           Atraumatic, MMM Neck:               Supple, symmetrical  Lungs:             Clear to auscultation bilaterally. No Wheezing or Rhonchi. No rales. Heart:              Regular  rhythm,  No  murmur   No edema  Abdomen:       Soft, non-tender. Not distended. Bowel sounds normal  Extremities:     No cyanosis. Skin:                Not pale. Not Jaundiced  No rashes   Psych:             Not agitated.   Neurologic:      Alert, moves all extremities, oriented      CODE STATUS:   Full Code    DNR    Partial    Comfort Care     Signed:   Marguerite Bruce MD  Date of Service:  5/9/2020  3:39 PM

## 2020-05-09 NOTE — PROGRESS NOTES
[facility]    ICU TEAM Progress Note    Name: Man Perales. : 1980   MRN: [id number]   Date: 2020      Assessment:     ICU Problems and plan :    1. Agitated delirium day #6 ETOH withdrawal - suspect this is meds at this point    - stop CIWA   - wean dex off   - haldol prn    - day/night cycle    - OOB if able   2. ETOH abuse  3. H/O HTN    - add back meds       Subjective:   Progress Note: 2020      Reason for ICU Admission: ETOH withdrawal      Overnight Events: tolerating slow dex wean     POD:* No surgery found *    S/P:     Active Problem List:     Problem List  Date Reviewed: 5/3/2020          Codes Class    * (Principal) Alcohol withdrawal syndrome ([facility] ICD-10-CM: F10.239  ICD-9-CM: 291.81         Insomnia ICD-10-CM: G47.00  ICD-9-CM: 780.52         Chest pain ICD-10-CM: R07.9  ICD-9-CM: 786.50         Elevated liver enzymes ICD-10-CM: R74.8  ICD-9-CM: 790.5         Previous back surgery ICD-10-CM: Z98.890  ICD-9-CM: V45.89         Other and unspecified hyperlipidemia ICD-10-CM: E78.5  ICD-9-CM: 272.4         Obesity ICD-10-CM: E66.9  ICD-9-CM: 278.00         Nicotine dependence ICD-10-CM: F17.200  ICD-9-CM: 305.1         Essential hypertension, benign ICD-10-CM: I10  ICD-9-CM: 401.1         Coarse tremors ICD-10-CM: G25.2  ICD-9-CM: 781.0         Anxiety ICD-10-CM: F41.9  ICD-9-CM: 300.00         Back pain ICD-10-CM: M54.9  ICD-9-CM: 724.5               Past Medical History:      has a past medical history of Alcoholism ([facility] Back pain, Chronic pain, Concussion, H/O spinal fusion, Hypertension, and Insomnia. Past Surgical History:      has a past surgical history that includes hx orthopaedic. Home Medications:     Prior to Admission medications    Medication Sig Start Date End Date Taking? Authorizing Provider   MULTIVITAMIN PO Take  by mouth. Other, MD Rony   lidocaine (LIDODERM) 5 % 1 Patch by TransDERmal route every twenty-four (24) hours.  Apply patch to the affected area for 12 hours a day and remove for 12 hours a day. Indications: pain 20   Nathan Martinez MD   ibuprofen (MOTRIN) 600 mg tablet Take 1 Tab by mouth every six (6) hours as needed for Pain. Indications: minor musculoskeletal injury, pain 20   Nathan Martinez MD   diclofenac (VOLTAREN) 1 % gel APPLY 4 G TO AFFECTED AREA FOUR (4) TIMES DAILY. 18   Joey Ochoa MD   LORazepam (ATIVAN) 0.5 mg tablet Take 1 Tab by mouth daily as needed for Anxiety. 11/15/17   Joey Ochoa MD   QUEtiapine (SEROQUEL) 25 mg tablet Take 1 Tab by mouth two (2) times a day. 17   Joey Ochoa MD   gabapentin (NEURONTIN) 300 mg capsule TAKE 3 CAPSULES BY MOUTH TWICE A DAY  Patient taking differently: TAKE 3 CAPSULES BY MOUTH THREE TIMES DAILY 10/25/17   Joey Ochoa MD   traZODone (DESYREL) 100 mg tablet Take 1 Tab by mouth nightly. 17   Joey Ochoa MD   cholecalciferol, VITAMIN D3, (VITAMIN D3) 5,000 unit tab tablet Take 1 Tab by mouth daily.  17   Cara Finley MD       Allergies/Social/Family History:     No Known Allergies   Social History     Tobacco Use    Smoking status: Former Smoker     Packs/day: 1.00     Years: 10.00     Pack years: 10.00     Types: Cigarettes     Last attempt to quit: 2014     Years since quittin.0    Smokeless tobacco: Current User   Substance Use Topics    Alcohol use: No      Family History   Problem Relation Age of Onset    Diabetes Mother     Hypertension Mother     Thyroid Disease Mother         hypothyroidism    Heart Attack Mother         age 64-smoker    Kori Solum Arthritis-osteo Mother     Asthma Mother     Heart Disease Mother     Elevated Lipids Father     Heart Disease Father     Hypertension Father     Diabetes Father     Cancer Father         prostate    Heart Attack Father         in 52's    Alcohol abuse Father     Arthritis-osteo Father     Psychiatric Disorder Father        Review of Systems: unable     Objective:   Vital Signs:  Visit Vitals  /81   Pulse 60   Temp 98.1 °F (36.7 °C)   Resp 12   Ht 6' 1\" (1.854 m)   Wt 90.6 kg (199 lb 11.8 oz)   SpO2 99%   BMI 26.35 kg/m²    O2 Flow Rate (L/min): 2 l/min O2 Device: Nasal cannula Temp (24hrs), Av.3 °F (36.8 °C), Min:98.1 °F (36.7 °C), Max:98.8 °F (37.1 °C)           Intake/Output:     Intake/Output Summary (Last 24 hours) at 2020 0800  Last data filed at 2020 0700  Gross per 24 hour   Intake 182.97 ml   Output 1260 ml   Net -1077.03 ml       Physical Exam:    General:  Confused    Eyes:  Sclera anicteric. Pupils equally round and reactive to light. Neck: Supple   Lungs:   Clear to auscultation bilaterally, good effort   CV:  Regular rate and rhythm,no murmur, click, rub or gallop   Abdomen:   Soft, non-tender. bowel sounds normal. non-distended   Extremities: No cyanosis or edema   Skin: Skin color, texture, turgor normal. no acute rash or lesions                       LABS AND  DATA: Personally reviewed  Recent Labs     20  0418 20  0339   WBC 6.5 6.0   HGB 12.8 12.5   HCT 39.3 36.6   * 157     Recent Labs     20  0418 20  0339    139   K 3.1* 3.7    108   CO2 28 26   BUN 3* 3*   CREA 0.66* 0.65*   * 112*   CA 9.0 8.3*   MG 2.1 2.0   PHOS 4.7 4.5     No results for input(s): SGOT, GPT, AP, TBIL, TP, ALB, GLOB, AML, LPSE in the last 72 hours. No lab exists for component: AMYP  No results for input(s): INR, PTP, APTT, INREXT in the last 72 hours. No results for input(s): PHI, PCO2I, PO2I, FIO2I in the last 72 hours. No results for input(s): CPK, CKMB, TROIQ, BNPP in the last 72 hours.     Hemodynamics:   PAP:   CO:     Wedge:   CI:     CVP:    SVR:       PVR:       Ventilator Settings:  Mode Rate Tidal Volume Pressure FiO2 PEEP                    Peak airway pressure:      Minute ventilation:          MEDS: Reviewed    Chest X-Ray:  CXR Results  (Last 48 hours)    None ABCDEF Bundle/Checklist Completed:  Yes    SPECIAL EQUIPMENT  None    DISPOSITION  Stay in ICU      I personally spent 40 minutes of critical care time. This is time spent at this critically ill patient's bedside actively involved in patient care as well as the coordination of care and discussions with the patient's family. This does not include any procedural time which has been billed separately.     Jeremy Oneill MD

## 2020-05-10 LAB
ANION GAP SERPL CALC-SCNC: 7 MMOL/L (ref 5–15)
BASOPHILS # BLD: 0 K/UL (ref 0–0.1)
BASOPHILS NFR BLD: 1 % (ref 0–1)
BUN SERPL-MCNC: 6 MG/DL (ref 6–20)
BUN/CREAT SERPL: 8 (ref 12–20)
CALCIUM SERPL-MCNC: 9.5 MG/DL (ref 8.5–10.1)
CHLORIDE SERPL-SCNC: 104 MMOL/L (ref 97–108)
CO2 SERPL-SCNC: 24 MMOL/L (ref 21–32)
CREAT SERPL-MCNC: 0.76 MG/DL (ref 0.7–1.3)
DIFFERENTIAL METHOD BLD: ABNORMAL
EOSINOPHIL # BLD: 0.1 K/UL (ref 0–0.4)
EOSINOPHIL NFR BLD: 2 % (ref 0–7)
ERYTHROCYTE [DISTWIDTH] IN BLOOD BY AUTOMATED COUNT: 14.7 % (ref 11.5–14.5)
GLUCOSE SERPL-MCNC: 95 MG/DL (ref 65–100)
HCT VFR BLD AUTO: 43.1 % (ref 36.6–50.3)
HGB BLD-MCNC: 14.3 G/DL (ref 12.1–17)
IMM GRANULOCYTES # BLD AUTO: 0.1 K/UL (ref 0–0.04)
IMM GRANULOCYTES NFR BLD AUTO: 1 % (ref 0–0.5)
LYMPHOCYTES # BLD: 1.6 K/UL (ref 0.8–3.5)
LYMPHOCYTES NFR BLD: 21 % (ref 12–49)
MCH RBC QN AUTO: 30.7 PG (ref 26–34)
MCHC RBC AUTO-ENTMCNC: 33.2 G/DL (ref 30–36.5)
MCV RBC AUTO: 92.5 FL (ref 80–99)
MONOCYTES # BLD: 1 K/UL (ref 0–1)
MONOCYTES NFR BLD: 13 % (ref 5–13)
NEUTS SEG # BLD: 4.8 K/UL (ref 1.8–8)
NEUTS SEG NFR BLD: 62 % (ref 32–75)
NRBC # BLD: 0 K/UL (ref 0–0.01)
NRBC BLD-RTO: 0 PER 100 WBC
PHOSPHATE SERPL-MCNC: 3.2 MG/DL (ref 2.6–4.7)
PLATELET # BLD AUTO: 201 K/UL (ref 150–400)
PMV BLD AUTO: 10.6 FL (ref 8.9–12.9)
POTASSIUM SERPL-SCNC: 3.5 MMOL/L (ref 3.5–5.1)
RBC # BLD AUTO: 4.66 M/UL (ref 4.1–5.7)
SODIUM SERPL-SCNC: 135 MMOL/L (ref 136–145)
WBC # BLD AUTO: 7.7 K/UL (ref 4.1–11.1)

## 2020-05-10 PROCEDURE — 97161 PT EVAL LOW COMPLEX 20 MIN: CPT

## 2020-05-10 PROCEDURE — 74011250636 HC RX REV CODE- 250/636: Performed by: INTERNAL MEDICINE

## 2020-05-10 PROCEDURE — 85025 COMPLETE CBC W/AUTO DIFF WBC: CPT

## 2020-05-10 PROCEDURE — 36415 COLL VENOUS BLD VENIPUNCTURE: CPT

## 2020-05-10 PROCEDURE — 74011250636 HC RX REV CODE- 250/636: Performed by: EMERGENCY MEDICINE

## 2020-05-10 PROCEDURE — 65660000000 HC RM CCU STEPDOWN

## 2020-05-10 PROCEDURE — 74011250637 HC RX REV CODE- 250/637: Performed by: EMERGENCY MEDICINE

## 2020-05-10 PROCEDURE — 94760 N-INVAS EAR/PLS OXIMETRY 1: CPT

## 2020-05-10 PROCEDURE — 80048 BASIC METABOLIC PNL TOTAL CA: CPT

## 2020-05-10 PROCEDURE — 84100 ASSAY OF PHOSPHORUS: CPT

## 2020-05-10 PROCEDURE — 74011250637 HC RX REV CODE- 250/637: Performed by: INTERNAL MEDICINE

## 2020-05-10 PROCEDURE — 97116 GAIT TRAINING THERAPY: CPT

## 2020-05-10 RX ORDER — GABAPENTIN 100 MG/1
300 CAPSULE ORAL 2 TIMES DAILY
Status: DISCONTINUED | OUTPATIENT
Start: 2020-05-10 | End: 2020-05-12 | Stop reason: HOSPADM

## 2020-05-10 RX ORDER — MELATONIN
5000 DAILY
Status: DISCONTINUED | OUTPATIENT
Start: 2020-05-11 | End: 2020-05-12 | Stop reason: HOSPADM

## 2020-05-10 RX ORDER — TRAZODONE HYDROCHLORIDE 100 MG/1
100 TABLET ORAL
Status: DISCONTINUED | OUTPATIENT
Start: 2020-05-10 | End: 2020-05-12 | Stop reason: HOSPADM

## 2020-05-10 RX ADMIN — TRAZODONE HYDROCHLORIDE 100 MG: 100 TABLET ORAL at 21:27

## 2020-05-10 RX ADMIN — GABAPENTIN 300 MG: 100 CAPSULE ORAL at 21:27

## 2020-05-10 RX ADMIN — GABAPENTIN 300 MG: 300 CAPSULE ORAL at 08:59

## 2020-05-10 RX ADMIN — ONDANSETRON 4 MG: 2 INJECTION INTRAMUSCULAR; INTRAVENOUS at 10:25

## 2020-05-10 RX ADMIN — Medication 10 ML: at 21:29

## 2020-05-10 RX ADMIN — ENOXAPARIN SODIUM 40 MG: 40 INJECTION SUBCUTANEOUS at 09:00

## 2020-05-10 RX ADMIN — FOLIC ACID 1 MG: 1 TABLET ORAL at 08:59

## 2020-05-10 RX ADMIN — Medication 100 MG: at 08:59

## 2020-05-10 RX ADMIN — Medication 10 ML: at 15:17

## 2020-05-10 RX ADMIN — HALOPERIDOL LACTATE 5 MG: 5 INJECTION, SOLUTION INTRAMUSCULAR at 21:28

## 2020-05-10 NOTE — PROGRESS NOTES
Problem: Falls - Risk of  Goal: *Absence of Falls  Description: Document Pollard Reason Fall Risk and appropriate interventions in the flowsheet.   Outcome: Progressing Towards Goal  Note: Fall Risk Interventions:  Mobility Interventions: Bed/chair exit alarm, Communicate number of staff needed for ambulation/transfer, OT consult for ADLs, Patient to call before getting OOB, PT Consult for assist device competence, Strengthening exercises (ROM-active/passive)    Mentation Interventions: Bed/chair exit alarm, Door open when patient unattended, Toileting rounds, Room close to nurse's station, More frequent rounding    Medication Interventions: Bed/chair exit alarm, Patient to call before getting OOB, Teach patient to arise slowly    Elimination Interventions: Call light in reach, Bed/chair exit alarm, Stay With Me (per policy), Toilet paper/wipes in reach, Toileting schedule/hourly rounds, Urinal in reach, Patient to call for help with toileting needs    History of Falls Interventions: Bed/chair exit alarm, Consult care management for discharge planning, Door open when patient unattended, Room close to nurse's station, Assess for delayed presentation/identification of injury for 48 hrs (comment for end date), Vital signs minimum Q4HRs X 24 hrs (comment for end date)

## 2020-05-10 NOTE — PROGRESS NOTES
Problem: Pain  Goal: *Control of Pain  Outcome: Progressing Towards Goal  Goal: *PALLIATIVE CARE:  Alleviation of Pain  Outcome: Progressing Towards Goal     Problem: Alcohol Withdrawal  Goal: *STG: Seeks staff when symptoms of withdrawal increase  Outcome: Progressing Towards Goal  Goal: *STG: Complies with medication therapy  Outcome: Progressing Towards Goal

## 2020-05-10 NOTE — PROGRESS NOTES
[facility]                                                                                          Hospitalist Progress Note  Bob Bronson MD  Answering service: [phone] from in house phone        Date of Service:  5/10/2020  NAME:  Jazmyn Estevze. :  1980  MRN:  [id number]      Admission Summary:     Patient initially admitted on , had worsening widrawal syndrome requiring transfer to ICU. Now stable for transfer out of the ICU. Seen and examined at the bedside. Patient not on COVID-19 precautions  Weaned off Precedex infusion today. Stable vitals. Mentation remains very slow. Not agitated. Denies pain or dyspnea. Interval history / Subjective:       Patient is more alert, no more withdrawal noted. Assessment & Plan:     Alcohol withdrawal  -Now resolved. Benzodiazepines discontinued  -Haldol PRN for agitations  -Continue thiamine and folic acid    Hypertension  -Blood pressure control for the most part  -Currently on medications    Hypokalemia  -Now resolved    Generalized weakness  -PT to evaluate    Code status: FULL  DVT prophylaxis: Lovenox    Care Plan discussed with: Patient/Family and Nurse  Anticipated Disposition: TBD  Anticipated Discharge: 24 hours to 48 hours     Hospital Problems  Date Reviewed: 5/3/2020          Codes Class Noted POA    * (Principal) Alcohol withdrawal syndrome ([facility] ICD-10-CM: I17.562  ICD-9-CM: 291.81  5/3/2020 Yes                Review of Systems:   A comprehensive review of systems was negative except for that written in the HPI. Vital Signs:    Last 24hrs VS reviewed since prior progress note.  Most recent are:  Visit Vitals  /77 (BP 1 Location: Left arm, BP Patient Position: At rest)   Pulse 90   Temp 98.1 °F (36.7 °C)   Resp 15   Ht 6' 1\" (1.854 m)   Wt 90.4 kg (199 lb 4.7 oz)   SpO2 99%   BMI 26.29 kg/m²         Intake/Output Summary (Last 24 hours) at 5/10/2020 1235  Last data filed at 5/9/2020 2356  Gross per 24 hour   Intake    Output 1000 ml   Net -1000 ml        Physical Examination:             Constitutional:  No acute distress, cooperative, pleasant    ENT:  Oral mucosa moist, oropharynx benign. Resp:  CTA bilaterally. No wheezing/rhonchi/rales. No accessory muscle use   CV:  Regular rhythm, normal rate, no murmurs, gallops, rubs    GI:  Soft, non distended, non tender. normoactive bowel sounds, no hepatosplenomegaly     Musculoskeletal:  No edema, warm, 2+ pulses throughout    Neurologic:  Moves all extremities. AAOx3, CN II-XII reviewed     Skin:  Good turgor, no rashes or ulcers       Data Review:    Review and/or order of clinical lab test      Labs:     Recent Labs     05/10/20  0205 05/09/20  0418   WBC 7.7 6.5   HGB 14.3 12.8   HCT 43.1 39.3    144*     Recent Labs     05/10/20  0205 05/09/20  0418 05/08/20  0339   * 140 139   K 3.5 3.1* 3.7    107 108   CO2 24 28 26   BUN 6 3* 3*   CREA 0.76 0.66* 0.65*   GLU 95 105* 112*   CA 9.5 9.0 8.3*   MG  --  2.1 2.0   PHOS 3.2 4.7 4.5     No results for input(s): SGOT, GPT, ALT, AP, TBIL, TBILI, TP, ALB, GLOB, GGT, AML, LPSE in the last 72 hours. No lab exists for component: AMYP, HLPSE  No results for input(s): INR, PTP, APTT, INREXT in the last 72 hours. No results for input(s): FE, TIBC, PSAT, FERR in the last 72 hours. No results found for: FOL, RBCF   No results for input(s): PH, PCO2, PO2 in the last 72 hours. No results for input(s): CPK, CKNDX, TROIQ in the last 72 hours.     No lab exists for component: CPKMB  Lab Results   Component Value Date/Time    Cholesterol, total 108 05/04/2020 03:34 AM    HDL Cholesterol 36 05/04/2020 03:34 AM    LDL, calculated 27 05/04/2020 03:34 AM    Triglyceride 225 (H) 05/04/2020 03:34 AM    CHOL/HDL Ratio 3.0 05/04/2020 03:34 AM     No results found for: [facility]  Lab Results   Component Value Date/Time    Color YELLOW/STRAW 05/03/2020 05:58 PM    Appearance CLEAR 05/03/2020 05:58 PM    Specific gravity 1.025 05/03/2020 05:58 PM    Specific gravity 1.010 08/31/2017 12:33 AM    pH (UA) 5.5 05/03/2020 05:58 PM    Protein TRACE (A) 05/03/2020 05:58 PM    Glucose Negative 05/03/2020 05:58 PM    Ketone Negative 05/03/2020 05:58 PM    Bilirubin Negative 05/03/2020 05:58 PM    Urobilinogen 0.2 05/03/2020 05:58 PM    Nitrites Negative 05/03/2020 05:58 PM    Leukocyte Esterase Negative 05/03/2020 05:58 PM    Epithelial cells FEW 05/03/2020 05:58 PM    Bacteria Negative 05/03/2020 05:58 PM    WBC 0-4 05/03/2020 05:58 PM    RBC 0-5 05/03/2020 05:58 PM         Medications Reviewed:     Current Facility-Administered Medications   Medication Dose Route Frequency    nitroglycerin (NITROLINGUAL) sublingual 0.4 mg/spray  1 Spray SubLINGual Q5MIN PRN    thiamine HCL (B-1) tablet 100 mg  100 mg Oral DAILY    folic acid (FOLVITE) tablet 1 mg  1 mg Oral DAILY    gabapentin (NEURONTIN) capsule 300 mg  300 mg Oral TID    haloperidol lactate (HALDOL) injection 5 mg  5 mg IntraVENous Q4H PRN    enoxaparin (LOVENOX) injection 40 mg  40 mg SubCUTAneous Q24H    hydrALAZINE (APRESOLINE) 20 mg/mL injection 10 mg  10 mg IntraVENous Q6H PRN    sodium chloride (NS) flush 5-40 mL  5-40 mL IntraVENous Q8H    sodium chloride (NS) flush 5-40 mL  5-40 mL IntraVENous PRN    acetaminophen (TYLENOL) tablet 650 mg  650 mg Oral Q4H PRN    ondansetron (ZOFRAN) injection 4 mg  4 mg IntraVENous Q4H PRN    bisacodyL (DULCOLAX) tablet 5 mg  5 mg Oral DAILY PRN    nicotine (NICODERM CQ) 21 mg/24 hr patch 1 Patch  1 Patch TransDERmal Q24H     ______________________________________________________________________  EXPECTED LENGTH OF STAY: 3d 9h  ACTUAL LENGTH OF STAY:          7                 Zoila Aly MD

## 2020-05-10 NOTE — PROGRESS NOTES
Problem: Mobility Impaired (Adult and Pediatric)  Goal: *Acute Goals and Plan of Care (Insert Text)  Description: FUNCTIONAL STATUS PRIOR TO ADMISSION: Patient was independent and active without use of DME.    HOME SUPPORT PRIOR TO ADMISSION: The patient has local support available via friends and family. Physical Therapy Goals  Initiated 5/10/2020  1. Patient will perform sit to stand with modified independence within 7 day(s). 2.  Patient will ambulate with independence for 500 feet with the least restrictive device within 7 day(s). 3.  Patient will ascend/descend 12 stairs with 1 handrail(s) with modified independence within 7 day(s). Outcome: Progressing Towards Goal   PHYSICAL THERAPY EVALUATION  Patient: Altagracia Rehman (42 y.o. male)  Date: 5/10/2020  Primary Diagnosis: Alcohol withdrawal syndrome (HCC) [F10.239]        Precautions:         ASSESSMENT  Based on the objective data described below, the patient presents with anxiety, slowed cognition, emotional lability, mild coordination deficits, and standing balance deficits following admission for ETOH withdrawal. During evaluation he was able to gait train 400' with a brief standing rest break for further assessment. Noted  BPM and SpO2 96% on RA. Gait training was without DME but noted fluctuations in jerry, diminished arm swing, inconsistent step width, head down posture, and path deviations. These improved in severity over distance but remained persistent. Anticipate good progress towards goals and will follow for a few visits to work on balance and stair assessment. Discharge recommendations TBD but between HHPT and outpatient if concerns remain when discharged. Current Level of Function Impacting Discharge (mobility/balance): multiple gait deviations    Other factors to consider for discharge:      Patient will benefit from skilled therapy intervention to address the above noted impairments.        PLAN :  Recommendations and Planned Interventions: bed mobility training, transfer training, gait training, therapeutic exercises, and neuromuscular re-education      Frequency/Duration: Patient will be followed by physical therapy:  5 times a week to address goals. Recommendation for discharge: (in order for the patient to meet his/her long term goals)  Physical therapy at least 2 days/week in the home       IF patient discharges home will need the following DME: to be determined (TBD)         SUBJECTIVE:   Patient stated I can't stop crying.  \"I don't know why I can't talk! \"    OBJECTIVE DATA SUMMARY:   HISTORY:    Past Medical History:   Diagnosis Date    Alcoholism ([facility] [address]     in remission    Back pain     Chronic pain     back    Concussion     H/O spinal fusion     15 years ago    Hypertension     Insomnia      Past Surgical History:   Procedure Laterality Date    HX ORTHOPAEDIC      Spinal fusion L5 S1 in 2003       Personal factors and/or comorbidities impacting plan of care:     Home Situation  Home Environment: Private residence  # Steps to Enter: 4  One/Two Story Residence: One story  Living Alone: Yes  Support Systems: Family member(s), Friends \ neighbors  Patient Expects to be Discharged to[de-identified] Private residence  Current DME Used/Available at Home: None    EXAMINATION/PRESENTATION/DECISION MAKING:   Critical Behavior:  Neurologic State: Alert, Confused  Orientation Level: Oriented X4  Cognition: Follows commands, Decreased attention/concentration, Impulsive, Poor safety awareness     Hearing: Auditory  Auditory Impairment: None  Skin:    Edema:   Range Of Motion:  AROM: Within functional limits                       Strength:    Strength:  Within functional limits                    Tone & Sensation:   Tone: Normal                              Coordination:  Coordination: Generally decreased, functional  Vision:      Functional Mobility:  Bed Mobility:              Transfers:  Sit to Stand: Contact guard assistance  Stand to Sit: Contact guard assistance                       Balance:   Sitting: Intact  Standing: Impaired  Standing - Static: Fair  Standing - Dynamic : Fair  Ambulation/Gait Training:  Distance (ft): 400 Feet (ft)  Assistive Device: Gait belt  Ambulation - Level of Assistance: Contact guard assistance     Gait Description (WDL): Exceptions to WDL  Gait Abnormalities: Decreased step clearance; Path deviations; Altered arm swing        Base of Support: Narrowed     Speed/Iram: Fluctuations           Physical Therapy Evaluation Charge Determination   History Examination Presentation Decision-Making   MEDIUM  Complexity : 1-2 comorbidities / personal factors will impact the outcome/ POC  MEDIUM Complexity : 3 Standardized tests and measures addressing body structure, function, activity limitation and / or participation in recreation  LOW Complexity : Stable, uncomplicated  LOW Complexity : FOTO score of       Based on the above components, the patient evaluation is determined to be of the following complexity level: LOW     Pain Rating:      Activity Tolerance:   Good  Please refer to the flowsheet for vital signs taken during this treatment. After treatment patient left in no apparent distress:   Sitting in chair, Call bell within reach, and Bed / chair alarm activated    COMMUNICATION/EDUCATION:   The patients plan of care was discussed with: Registered nurse. Fall prevention education was provided and the patient/caregiver indicated understanding., Patient/family have participated as able in goal setting and plan of care. , and Patient/family agree to work toward stated goals and plan of care.     Thank you for this referral.  Sven Robledo, PT, DPT   Time Calculation: 39 mins

## 2020-05-10 NOTE — PROGRESS NOTES
Problem: Falls - Risk of  Goal: *Absence of Falls  Description: Document Miguel A Bryan Fall Risk and appropriate interventions in the flowsheet. Outcome: Progressing Towards Goal  Note: Fall Risk Interventions:  Mobility Interventions: Bed/chair exit alarm, Communicate number of staff needed for ambulation/transfer, Patient to call before getting OOB, PT Consult for mobility concerns, PT Consult for assist device competence, Strengthening exercises (ROM-active/passive)    Mentation Interventions: Bed/chair exit alarm, Door open when patient unattended, Toileting rounds, Room close to nurse's station, More frequent rounding    Medication Interventions: Bed/chair exit alarm, Evaluate medications/consider consulting pharmacy, Patient to call before getting OOB    Elimination Interventions: Bed/chair exit alarm, Call light in reach, Patient to call for help with toileting needs, Toilet paper/wipes in reach, Toileting schedule/hourly rounds    History of Falls Interventions: Bed/chair exit alarm, Consult care management for discharge planning, Door open when patient unattended, Investigate reason for fall, Room close to nurse's station, Utilize gait belt for transfer/ambulation         Problem: Patient Education: Go to Patient Education Activity  Goal: Patient/Family Education  Outcome: Progressing Towards Goal     Problem: Pressure Injury - Risk of  Goal: *Prevention of pressure injury  Description: Document David Scale and appropriate interventions in the flowsheet.   Outcome: Progressing Towards Goal  Note: Pressure Injury Interventions:  Sensory Interventions: Assess changes in LOC, Avoid rigorous massage over bony prominences, Keep linens dry and wrinkle-free, Pressure redistribution bed/mattress (bed type)    Moisture Interventions: Check for incontinence Q2 hours and as needed, Minimize layers    Activity Interventions: Increase time out of bed, Pressure redistribution bed/mattress(bed type)    Mobility Interventions: Pressure redistribution bed/mattress (bed type), Turn and reposition approx.  every two hours(pillow and wedges), PT/OT evaluation    Nutrition Interventions: Offer support with meals,snacks and hydration, Document food/fluid/supplement intake    Friction and Shear Interventions: Minimize layers, Lift sheet                Problem: Pain  Goal: *Control of Pain  Outcome: Progressing Towards Goal     Problem: Alcohol Withdrawal  Goal: *STG: Will identify negative impact of chemical dependency including the use of tobacco, alcohol, and other substances  Outcome: Progressing Towards Goal  Goal: *STG: Verbalizes abstinence as an achievable goal  Outcome: Progressing Towards Goal  Goal: *STG: Agrees to participate in outpatient after care program to support ongoing mental health  Outcome: Progressing Towards Goal     Problem: Delirium Treatment  Goal: *Sensory perception restored to baseline  Outcome: Progressing Towards Goal  Goal: *Emotional stability restored to baseline  Outcome: Progressing Towards Goal

## 2020-05-10 NOTE — PROGRESS NOTES
1630 [address] transferred from CCU to NSTU.  Very anxious - elevators make him anxious - would like more gabpentin   confused at times but easily redirected  1800 Cont to try to get out of bed - redirected by nurses station  2000 reassessment 0- redirected into bed  2200 condom cath came off - cleaned and bathed - linen change

## 2020-05-10 NOTE — PROGRESS NOTES
Bedside shift change report given to [address] [facility] [address] (oncoming nurse) by Neelam Bradshaw (offgoing nurse). Report included the following information SBAR, Kardex and Recent Results.

## 2020-05-10 NOTE — PROGRESS NOTES
Bedside and Verbal shift change report given to Katie Hanna RN (oncoming nurse) by Jon Chopra RN (offgoing nurse). Report included the following information SBAR, Kardex, MAR, Cardiac Rhythm NSR and Dual Neuro Assessment.

## 2020-05-10 NOTE — PROGRESS NOTES
Bedside shift change report given to Liz Rn (oncoming nurse) by Catarina Adrian RN (offgoing nurse). Report included the following information SBAR, ED Summary, MAR, Accordion, Recent Results, Cardiac Rhythm NSR and Dual Neuro Assessment.

## 2020-05-11 ENCOUNTER — APPOINTMENT (OUTPATIENT)
Dept: GENERAL RADIOLOGY | Age: 40
DRG: 894 | End: 2020-05-11
Attending: HOSPITALIST
Payer: SELF-PAY

## 2020-05-11 LAB
ANION GAP SERPL CALC-SCNC: 7 MMOL/L (ref 5–15)
APPEARANCE UR: ABNORMAL
BACTERIA URNS QL MICRO: ABNORMAL /HPF
BASOPHILS # BLD: 0 K/UL (ref 0–0.1)
BASOPHILS NFR BLD: 0 % (ref 0–1)
BILIRUB UR QL: NEGATIVE
BUN SERPL-MCNC: 6 MG/DL (ref 6–20)
BUN/CREAT SERPL: 6 (ref 12–20)
CALCIUM SERPL-MCNC: 9 MG/DL (ref 8.5–10.1)
CHLORIDE SERPL-SCNC: 102 MMOL/L (ref 97–108)
CO2 SERPL-SCNC: 26 MMOL/L (ref 21–32)
COLOR UR: ABNORMAL
COMMENT, HOLDF: NORMAL
CREAT SERPL-MCNC: 0.93 MG/DL (ref 0.7–1.3)
DIFFERENTIAL METHOD BLD: ABNORMAL
EOSINOPHIL # BLD: 0.1 K/UL (ref 0–0.4)
EOSINOPHIL NFR BLD: 1 % (ref 0–7)
EPITH CASTS URNS QL MICRO: ABNORMAL /LPF
ERYTHROCYTE [DISTWIDTH] IN BLOOD BY AUTOMATED COUNT: 14.8 % (ref 11.5–14.5)
GLUCOSE SERPL-MCNC: 95 MG/DL (ref 65–100)
GLUCOSE UR STRIP.AUTO-MCNC: 500 MG/DL
HCT VFR BLD AUTO: 43.9 % (ref 36.6–50.3)
HGB BLD-MCNC: 14.6 G/DL (ref 12.1–17)
HGB UR QL STRIP: ABNORMAL
IMM GRANULOCYTES # BLD AUTO: 0.1 K/UL (ref 0–0.04)
IMM GRANULOCYTES NFR BLD AUTO: 1 % (ref 0–0.5)
KETONES UR QL STRIP.AUTO: NEGATIVE MG/DL
LEUKOCYTE ESTERASE UR QL STRIP.AUTO: ABNORMAL
LYMPHOCYTES # BLD: 1.2 K/UL (ref 0.8–3.5)
LYMPHOCYTES NFR BLD: 10 % (ref 12–49)
MCH RBC QN AUTO: 32.1 PG (ref 26–34)
MCHC RBC AUTO-ENTMCNC: 33.3 G/DL (ref 30–36.5)
MCV RBC AUTO: 96.5 FL (ref 80–99)
MONOCYTES # BLD: 1.8 K/UL (ref 0–1)
MONOCYTES NFR BLD: 16 % (ref 5–13)
NEUTS SEG # BLD: 8.6 K/UL (ref 1.8–8)
NEUTS SEG NFR BLD: 72 % (ref 32–75)
NITRITE UR QL STRIP.AUTO: POSITIVE
NRBC # BLD: 0 K/UL (ref 0–0.01)
NRBC BLD-RTO: 0 PER 100 WBC
PH UR STRIP: 6.5 [PH] (ref 5–8)
PHOSPHATE SERPL-MCNC: 3.7 MG/DL (ref 2.6–4.7)
PLATELET # BLD AUTO: 206 K/UL (ref 150–400)
PMV BLD AUTO: 10.4 FL (ref 8.9–12.9)
POTASSIUM SERPL-SCNC: 3.6 MMOL/L (ref 3.5–5.1)
PROT UR STRIP-MCNC: NEGATIVE MG/DL
RBC # BLD AUTO: 4.55 M/UL (ref 4.1–5.7)
RBC #/AREA URNS HPF: ABNORMAL /HPF (ref 0–5)
SAMPLES BEING HELD,HOLD: NORMAL
SODIUM SERPL-SCNC: 135 MMOL/L (ref 136–145)
SP GR UR REFRACTOMETRY: 1.02 (ref 1–1.03)
UR CULT HOLD, URHOLD: NORMAL
UROBILINOGEN UR QL STRIP.AUTO: 1 EU/DL (ref 0.2–1)
WBC # BLD AUTO: 11.9 K/UL (ref 4.1–11.1)
WBC URNS QL MICRO: ABNORMAL /HPF (ref 0–4)

## 2020-05-11 PROCEDURE — 80048 BASIC METABOLIC PNL TOTAL CA: CPT

## 2020-05-11 PROCEDURE — 71045 X-RAY EXAM CHEST 1 VIEW: CPT

## 2020-05-11 PROCEDURE — 74011000258 HC RX REV CODE- 258: Performed by: HOSPITALIST

## 2020-05-11 PROCEDURE — 87086 URINE CULTURE/COLONY COUNT: CPT

## 2020-05-11 PROCEDURE — 36415 COLL VENOUS BLD VENIPUNCTURE: CPT

## 2020-05-11 PROCEDURE — 94760 N-INVAS EAR/PLS OXIMETRY 1: CPT

## 2020-05-11 PROCEDURE — 74011250637 HC RX REV CODE- 250/637: Performed by: INTERNAL MEDICINE

## 2020-05-11 PROCEDURE — 65660000000 HC RM CCU STEPDOWN

## 2020-05-11 PROCEDURE — 87186 SC STD MICRODIL/AGAR DIL: CPT

## 2020-05-11 PROCEDURE — 74011250636 HC RX REV CODE- 250/636: Performed by: INTERNAL MEDICINE

## 2020-05-11 PROCEDURE — 87040 BLOOD CULTURE FOR BACTERIA: CPT

## 2020-05-11 PROCEDURE — 74011250636 HC RX REV CODE- 250/636: Performed by: HOSPITALIST

## 2020-05-11 PROCEDURE — 85025 COMPLETE CBC W/AUTO DIFF WBC: CPT

## 2020-05-11 PROCEDURE — 87077 CULTURE AEROBIC IDENTIFY: CPT

## 2020-05-11 PROCEDURE — 84100 ASSAY OF PHOSPHORUS: CPT

## 2020-05-11 PROCEDURE — 81001 URINALYSIS AUTO W/SCOPE: CPT

## 2020-05-11 RX ADMIN — Medication 10 ML: at 21:47

## 2020-05-11 RX ADMIN — ACETAMINOPHEN 650 MG: 325 TABLET, FILM COATED ORAL at 21:47

## 2020-05-11 RX ADMIN — FOLIC ACID 1 MG: 1 TABLET ORAL at 08:38

## 2020-05-11 RX ADMIN — CEFTRIAXONE 1 G: 1 INJECTION, POWDER, FOR SOLUTION INTRAMUSCULAR; INTRAVENOUS at 16:02

## 2020-05-11 RX ADMIN — GABAPENTIN 300 MG: 100 CAPSULE ORAL at 08:38

## 2020-05-11 RX ADMIN — MELATONIN 5 TABLET: at 08:38

## 2020-05-11 RX ADMIN — TRAZODONE HYDROCHLORIDE 100 MG: 100 TABLET ORAL at 21:47

## 2020-05-11 RX ADMIN — ACETAMINOPHEN 650 MG: 325 TABLET, FILM COATED ORAL at 10:20

## 2020-05-11 RX ADMIN — Medication 10 ML: at 14:00

## 2020-05-11 RX ADMIN — Medication 10 ML: at 05:18

## 2020-05-11 RX ADMIN — GABAPENTIN 300 MG: 100 CAPSULE ORAL at 21:47

## 2020-05-11 RX ADMIN — ACETAMINOPHEN 650 MG: 325 TABLET, FILM COATED ORAL at 14:27

## 2020-05-11 RX ADMIN — ENOXAPARIN SODIUM 40 MG: 40 INJECTION SUBCUTANEOUS at 08:37

## 2020-05-11 RX ADMIN — Medication 100 MG: at 08:38

## 2020-05-11 NOTE — PROGRESS NOTES
Physical Therapy Note  05/11/2020    Chart reviewed and pt discussed with RN - pt with new high fevers today, periodic confusion, and general malaise. Currently in bed resting - will defer and follow up tomorrow.     Livia Tipton, PT, DPT

## 2020-05-11 NOTE — PROGRESS NOTES
[facility]  Hospitalist Group                                                                                          Hospitalist Progress Note  Jose D Khan MD  Answering service: [phone] from in house phone        Date of Service:  2020  NAME:  Brock Spurling. :  1980  MRN:  [id number]      Admission Summary:     Patient initially admitted on , had worsening widrawal syndrome requiring transfer to ICU. Now stable for transfer out of the ICU. Seen and examined at the bedside. Patient not on COVID-19 precautions  Weaned off Precedex infusion today. Stable vitals. Mentation remains very slow. Not agitated. Denies pain or dyspnea. Interval history / Subjective: This morning,he was febrile - T max -102.5. He is alert and oriented X 2, states that he did not feel well this morning but denied any thing specific -chest pain,cough,SOB,dysuria. Assessment & Plan:     Fever:  -obtained blood cultures, CXR no infiltrates. -UA showed 10-20 WBCs and positive nitrities- likely has UTI  -started rocephin.  -Urine cultures pending      Alcohol withdrawal with h/o chronic alcoholism - resolved  -off benzos now  -Haldol PRN for agitations  -Continue thiamine and folic acid  - consulted for providing alcohol rehab resources  -Discussed with psych NP- as patient has alcohol withdrawal symptoms resolved- recommended OP follow up.     Hypokalemia  Replete as needed    Generalized weakness  -PT/OT eval    Code status: FULL  DVT prophylaxis: Lovenox    Care Plan discussed with: Patient/Family and Nurse  Anticipated Disposition: TBD  Anticipated Discharge: 24 hours to 48 hours     Hospital Problems  Date Reviewed: 5/3/2020          Codes Class Noted POA    * (Principal) Alcohol withdrawal syndrome ([facility] [address] ICD-10-CM: I72.046  ICD-9-CM: 291.81  5/3/2020 Yes                Review of Systems:   A comprehensive review of systems was negative except for that written in the HPI. Vital Signs:    Last 24hrs VS reviewed since prior progress note. Most recent are:  Visit Vitals  /68 (BP 1 Location: Left arm, BP Patient Position: At rest)   Pulse 98   Temp 99.3 °F (37.4 °C)   Resp 24   Ht 6' 1\" (1.854 m)   Wt 90.8 kg (200 lb 2.8 oz)   SpO2 99%   BMI 26.41 kg/m²         Intake/Output Summary (Last 24 hours) at 5/11/2020 1759  Last data filed at 5/11/2020 0516  Gross per 24 hour   Intake    Output 400 ml   Net -400 ml        Physical Examination:             Constitutional:  mild distress   ENT:  Oral mucosa moist, oropharynx benign,EOMI. Resp:  clear to auscultation b/l   CV:  Regular rhythm, normal rate,S1, S2 wnl    GI:  Soft, non distended, non tender. normoactive bowel sounds    Musculoskeletal:  No LE edema    Neurologic:  alert and oriented X 2, moves all extremities. Skin:  Good turgor, no rashes or ulcers       Data Review:    Review and/or order of clinical lab test,radiology      Labs:     Recent Labs     05/11/20  0520 05/10/20  0205   WBC 11.9* 7.7   HGB 14.6 14.3   HCT 43.9 43.1    201     Recent Labs     05/11/20  0520 05/10/20  0205 05/09/20  0418   * 135* 140   K 3.6 3.5 3.1*    104 107   CO2 26 24 28   BUN 6 6 3*   CREA 0.93 0.76 0.66*   GLU 95 95 105*   CA 9.0 9.5 9.0   MG  --   --  2.1   PHOS 3.7 3.2 4.7     No results for input(s): SGOT, GPT, ALT, AP, TBIL, TBILI, TP, ALB, GLOB, GGT, AML, LPSE in the last 72 hours. No lab exists for component: AMYP, HLPSE  No results for input(s): INR, PTP, APTT, INREXT, INREXT in the last 72 hours. No results for input(s): FE, TIBC, PSAT, FERR in the last 72 hours. No results found for: FOL, RBCF   No results for input(s): PH, PCO2, PO2 in the last 72 hours. No results for input(s): CPK, CKNDX, TROIQ in the last 72 hours.     No lab exists for component: CPKMB  Lab Results   Component Value Date/Time    Cholesterol, total 108 05/04/2020 03:34 AM    HDL Cholesterol 36 05/04/2020 03:34 AM    LDL, calculated 27 05/04/2020 03:34 AM    Triglyceride 225 (H) 05/04/2020 03:34 AM    CHOL/HDL Ratio 3.0 05/04/2020 03:34 AM     No results found for: [facility]  Lab Results   Component Value Date/Time    Color YELLOW/STRAW 05/11/2020 11:15 AM    Appearance CLOUDY (A) 05/11/2020 11:15 AM    Specific gravity 1.016 05/11/2020 11:15 AM    Specific gravity 1.025 05/03/2020 05:58 PM    pH (UA) 6.5 05/11/2020 11:15 AM    Protein Negative 05/11/2020 11:15 AM    Glucose 500 (A) 05/11/2020 11:15 AM    Ketone Negative 05/11/2020 11:15 AM    Bilirubin Negative 05/11/2020 11:15 AM    Urobilinogen 1.0 05/11/2020 11:15 AM    Nitrites Positive (A) 05/11/2020 11:15 AM    Leukocyte Esterase MODERATE (A) 05/11/2020 11:15 AM    Epithelial cells FEW 05/11/2020 11:15 AM    Bacteria 4+ (A) 05/11/2020 11:15 AM    WBC 10-20 05/11/2020 11:15 AM    RBC 0-5 05/11/2020 11:15 AM         Medications Reviewed:     Current Facility-Administered Medications   Medication Dose Route Frequency    cefTRIAXone (ROCEPHIN) 1 g in 0.9% sodium chloride (MBP/ADV) 50 mL  1 g IntraVENous Q24H    cholecalciferol (VITAMIN D3) (1000 Units /25 mcg) tablet 5 Tab  5,000 Units Oral DAILY    traZODone (DESYREL) tablet 100 mg  100 mg Oral QHS    gabapentin (NEURONTIN) capsule 300 mg  300 mg Oral BID    nitroglycerin (NITROLINGUAL) sublingual 0.4 mg/spray  1 Spray SubLINGual Q5MIN PRN    thiamine HCL (B-1) tablet 100 mg  100 mg Oral DAILY    folic acid (FOLVITE) tablet 1 mg  1 mg Oral DAILY    haloperidol lactate (HALDOL) injection 5 mg  5 mg IntraVENous Q4H PRN    enoxaparin (LOVENOX) injection 40 mg  40 mg SubCUTAneous Q24H    hydrALAZINE (APRESOLINE) 20 mg/mL injection 10 mg  10 mg IntraVENous Q6H PRN    sodium chloride (NS) flush 5-40 mL  5-40 mL IntraVENous Q8H    sodium chloride (NS) flush 5-40 mL  5-40 mL IntraVENous PRN    acetaminophen (TYLENOL) tablet 650 mg  650 mg Oral Q4H PRN    ondansetron (ZOFRAN) injection 4 mg  4 mg IntraVENous Q4H PRN    bisacodyL (DULCOLAX) tablet 5 mg  5 mg Oral DAILY PRN    nicotine (NICODERM CQ) 21 mg/24 hr patch 1 Patch  1 Patch TransDERmal Q24H     ______________________________________________________________________  EXPECTED LENGTH OF STAY: 3d 9h  ACTUAL LENGTH OF STAY:          8                 Leslye Bradshaw MD

## 2020-05-11 NOTE — PROGRESS NOTES
05/11/20 1357   Vitals   Temp (!) 101.8 °F (38.8 °C)   Temp Source Oral   Pulse (Heart Rate) 98   Heart Rate Source Monitor   Resp Rate 19   O2 Sat (%) 98 %   Level of Consciousness Alert   /68   MAP (Calculated) 80   BP 1 Location Left arm   BP 1 Method Automatic   BP Patient Position At rest   Cardiac Rhythm NSR   MEWS Score 3   Alarms Set and Audible Cardiac alarms   Box Number 676   Electrodes Replaced No   Pain 1   Pain Scale 1 Numeric (0 - 10)   Pain Intensity 1 0   Patient Stated Pain Goal 0   Oxygen Therapy   O2 Device Room air   Patient Observation   Patient Turned Turns self   Observations in chair, resting        05/11/20 1357   Vitals   Temp (!) 101.8 °F (38.8 °C)   Temp Source Oral   Pulse (Heart Rate) 98   Heart Rate Source Monitor   Resp Rate 19   O2 Sat (%) 98 %   Level of Consciousness Alert   /68   MAP (Calculated) 80   BP 1 Location Left arm   BP 1 Method Automatic   BP Patient Position At rest   Cardiac Rhythm NSR   MEWS Score 3   Alarms Set and Audible Cardiac alarms   Box Number 676   Electrodes Replaced No   Pain 1   Pain Scale 1 Numeric (0 - 10)   Pain Intensity 1 0   Patient Stated Pain Goal 0   Oxygen Therapy   O2 Device Room air   Patient Observation   Patient Turned Turns self   Observations in chair, resting   \  MEWS of 3 MD aware. Will continue to monitor.

## 2020-05-11 NOTE — PROGRESS NOTES
Problem: Falls - Risk of  Goal: *Absence of Falls  Description: Document Starleen Freeze Fall Risk and appropriate interventions in the flowsheet. Outcome: Progressing Towards Goal  Note: Fall Risk Interventions:  Mobility Interventions: Bed/chair exit alarm, Communicate number of staff needed for ambulation/transfer, Patient to call before getting OOB, PT Consult for mobility concerns, PT Consult for assist device competence, Strengthening exercises (ROM-active/passive)    Mentation Interventions: Bed/chair exit alarm, Door open when patient unattended, Toileting rounds, Room close to nurse's station, More frequent rounding    Medication Interventions: Bed/chair exit alarm, Evaluate medications/consider consulting pharmacy, Patient to call before getting OOB    Elimination Interventions: Bed/chair exit alarm, Call light in reach, Patient to call for help with toileting needs, Toilet paper/wipes in reach, Toileting schedule/hourly rounds    History of Falls Interventions: Bed/chair exit alarm, Consult care management for discharge planning, Door open when patient unattended, Investigate reason for fall, Room close to nurse's station, Utilize gait belt for transfer/ambulation         Problem: Pressure Injury - Risk of  Goal: *Prevention of pressure injury  Description: Document David Scale and appropriate interventions in the flowsheet. Outcome: Progressing Towards Goal  Note: Pressure Injury Interventions:  Sensory Interventions: Assess changes in LOC, Avoid rigorous massage over bony prominences, Keep linens dry and wrinkle-free, Pressure redistribution bed/mattress (bed type)    Moisture Interventions: Check for incontinence Q2 hours and as needed, Minimize layers    Activity Interventions: Increase time out of bed, Pressure redistribution bed/mattress(bed type)    Mobility Interventions: Pressure redistribution bed/mattress (bed type), Turn and reposition approx.  every two hours(pillow and wedges), PT/OT evaluation    Nutrition Interventions: Offer support with meals,snacks and hydration, Document food/fluid/supplement intake    Friction and Shear Interventions: Minimize layers, Lift sheet                Problem: Pain  Goal: *Control of Pain  Outcome: Progressing Towards Goal     Problem: Alcohol Withdrawal  Goal: *STG: Remains safe in hospital  Outcome: Progressing Towards Goal  Goal: *STG: Seeks staff when symptoms of withdrawal increase  Outcome: Progressing Towards Goal  Goal: *STG: Complies with medication therapy  Outcome: Progressing Towards Goal  Goal: *STG: Attends activities and groups  Outcome: Progressing Towards Goal  Goal: *STG: Will identify negative impact of chemical dependency including the use of tobacco, alcohol, and other substances  Outcome: Progressing Towards Goal  Goal: *STG: Verbalizes abstinence as an achievable goal  Outcome: Progressing Towards Goal

## 2020-05-11 NOTE — PROGRESS NOTES
Bedside and Verbal shift change report given to Salma Snow RN (oncoming nurse) by Tish George RN (offgoing nurse). Report included the following information SBAR, Kardex, MAR, Cardiac Rhythm NSR and Dual Neuro Assessment.

## 2020-05-11 NOTE — PROGRESS NOTES
5/11/2020 -   PILAR:  - RUR: 10%  - CM notes patient's transfer to NSTU from CCU  - Patient has been febrile today, 5/11  - Patient has had periods of confusion today, 5/11  - Therapies were deferred by nursing today, 5/11  - Ins status under review  - Ins status may be barrier to substance abuse rehab    Patient is not oriented at the present time to discuss disposition planning. Patient is listed as self pay patient. Per 5/6 [employer] Account Note, patient may have an [place] policy; CM requested VUR review via email to determine patient's ins eligibility. At present time patient's NOK is not clear. It has been reported that family/patient may have interest in substance abuse rehab program.  Dependent on patient's ins status, rehab placement may be difficult. CM Team to follow for ins status and to discuss rehab potential with patient/family.   CRM: Rafiq Roman, MPH, [address] [place] [address]; Z: [phone]

## 2020-05-11 NOTE — PROGRESS NOTES
05/11/20 0957   Vitals   Temp (!) 102.8 °F (39.3 °C)   Temp Source Oral   Pulse (Heart Rate) 99   Heart Rate Source Monitor   Resp Rate 20   Level of Consciousness Alert   /56   MAP (Calculated) 72   BP 1 Location Left arm   BP 1 Method Automatic   BP Patient Position At rest   Cardiac Rhythm NSR   MEWS Score 3   Alarms Set and Audible Cardiac alarms   Box Number 676   Electrodes Replaced No   Pain 1   Pain Scale 1 Numeric (0 - 10)   Pain Intensity 1 0   Patient Stated Pain Goal 0   Oxygen Therapy   O2 Device Room air   Patient Observation   Patient Turned Turns self   Observations in bed, resting     MEWS of 3. MD aware. Will continue to monitor.

## 2020-05-12 VITALS
RESPIRATION RATE: 20 BRPM | SYSTOLIC BLOOD PRESSURE: 100 MMHG | HEIGHT: 73 IN | OXYGEN SATURATION: 97 % | TEMPERATURE: 98.4 F | DIASTOLIC BLOOD PRESSURE: 53 MMHG | HEART RATE: 86 BPM | WEIGHT: 201.06 LBS | BODY MASS INDEX: 26.65 KG/M2

## 2020-05-12 LAB
ANION GAP SERPL CALC-SCNC: 5 MMOL/L (ref 5–15)
BASOPHILS # BLD: 0 K/UL (ref 0–0.1)
BASOPHILS NFR BLD: 0 % (ref 0–1)
BUN SERPL-MCNC: 7 MG/DL (ref 6–20)
BUN/CREAT SERPL: 9 (ref 12–20)
CALCIUM SERPL-MCNC: 9.1 MG/DL (ref 8.5–10.1)
CHLORIDE SERPL-SCNC: 104 MMOL/L (ref 97–108)
CO2 SERPL-SCNC: 27 MMOL/L (ref 21–32)
CREAT SERPL-MCNC: 0.8 MG/DL (ref 0.7–1.3)
DIFFERENTIAL METHOD BLD: ABNORMAL
EOSINOPHIL # BLD: 0 K/UL (ref 0–0.4)
EOSINOPHIL NFR BLD: 0 % (ref 0–7)
ERYTHROCYTE [DISTWIDTH] IN BLOOD BY AUTOMATED COUNT: 14.6 % (ref 11.5–14.5)
GLUCOSE SERPL-MCNC: 87 MG/DL (ref 65–100)
HCT VFR BLD AUTO: 37.3 % (ref 36.6–50.3)
HGB BLD-MCNC: 12.3 G/DL (ref 12.1–17)
IMM GRANULOCYTES # BLD AUTO: 0.1 K/UL (ref 0–0.04)
IMM GRANULOCYTES NFR BLD AUTO: 1 % (ref 0–0.5)
LYMPHOCYTES # BLD: 1.8 K/UL (ref 0.8–3.5)
LYMPHOCYTES NFR BLD: 12 % (ref 12–49)
MCH RBC QN AUTO: 31.5 PG (ref 26–34)
MCHC RBC AUTO-ENTMCNC: 33 G/DL (ref 30–36.5)
MCV RBC AUTO: 95.6 FL (ref 80–99)
MONOCYTES # BLD: 2.4 K/UL (ref 0–1)
MONOCYTES NFR BLD: 16 % (ref 5–13)
NEUTS SEG # BLD: 10.4 K/UL (ref 1.8–8)
NEUTS SEG NFR BLD: 71 % (ref 32–75)
NRBC # BLD: 0 K/UL (ref 0–0.01)
NRBC BLD-RTO: 0 PER 100 WBC
PHOSPHATE SERPL-MCNC: 3.4 MG/DL (ref 2.6–4.7)
PLATELET # BLD AUTO: 207 K/UL (ref 150–400)
PMV BLD AUTO: 10.9 FL (ref 8.9–12.9)
POTASSIUM SERPL-SCNC: 3.6 MMOL/L (ref 3.5–5.1)
RBC # BLD AUTO: 3.9 M/UL (ref 4.1–5.7)
RBC MORPH BLD: ABNORMAL
SODIUM SERPL-SCNC: 136 MMOL/L (ref 136–145)
WBC # BLD AUTO: 14.7 K/UL (ref 4.1–11.1)
WBC MORPH BLD: ABNORMAL

## 2020-05-12 PROCEDURE — 74011250637 HC RX REV CODE- 250/637: Performed by: INTERNAL MEDICINE

## 2020-05-12 PROCEDURE — 84100 ASSAY OF PHOSPHORUS: CPT

## 2020-05-12 PROCEDURE — 80048 BASIC METABOLIC PNL TOTAL CA: CPT

## 2020-05-12 PROCEDURE — 36415 COLL VENOUS BLD VENIPUNCTURE: CPT

## 2020-05-12 PROCEDURE — 74011250636 HC RX REV CODE- 250/636: Performed by: INTERNAL MEDICINE

## 2020-05-12 PROCEDURE — 85025 COMPLETE CBC W/AUTO DIFF WBC: CPT

## 2020-05-12 RX ORDER — AMOXICILLIN AND CLAVULANATE POTASSIUM 875; 125 MG/1; MG/1
1 TABLET, FILM COATED ORAL 2 TIMES DAILY
Qty: 12 TAB | Refills: 0 | Status: SHIPPED | OUTPATIENT
Start: 2020-05-12 | End: 2020-05-18

## 2020-05-12 RX ORDER — FOLIC ACID 1 MG/1
1 TABLET ORAL DAILY
Qty: 30 TAB | Refills: 0 | Status: SHIPPED | OUTPATIENT
Start: 2020-05-13 | End: 2020-06-12

## 2020-05-12 RX ORDER — IBUPROFEN 200 MG
1 TABLET ORAL EVERY 24 HOURS
Qty: 30 PATCH | Refills: 0 | Status: SHIPPED | OUTPATIENT
Start: 2020-05-13 | End: 2020-06-12

## 2020-05-12 RX ORDER — LANOLIN ALCOHOL/MO/W.PET/CERES
100 CREAM (GRAM) TOPICAL DAILY
Qty: 30 TAB | Refills: 0 | Status: SHIPPED | OUTPATIENT
Start: 2020-05-13 | End: 2020-06-12

## 2020-05-12 RX ADMIN — MELATONIN 5 TABLET: at 08:13

## 2020-05-12 RX ADMIN — BISACODYL 5 MG: 5 TABLET, COATED ORAL at 09:00

## 2020-05-12 RX ADMIN — ENOXAPARIN SODIUM 40 MG: 40 INJECTION SUBCUTANEOUS at 08:12

## 2020-05-12 RX ADMIN — FOLIC ACID 1 MG: 1 TABLET ORAL at 08:13

## 2020-05-12 RX ADMIN — Medication 100 MG: at 08:13

## 2020-05-12 RX ADMIN — GABAPENTIN 300 MG: 100 CAPSULE ORAL at 08:13

## 2020-05-12 RX ADMIN — Medication 10 ML: at 05:45

## 2020-05-12 NOTE — PROGRESS NOTES
Problem: Falls - Risk of  Goal: *Absence of Falls  Description: Document Vidhya Rehman Fall Risk and appropriate interventions in the flowsheet.   Outcome: Progressing Towards Goal  Note: Fall Risk Interventions:  Mobility Interventions: Assess mobility with egress test, Communicate number of staff needed for ambulation/transfer, OT consult for ADLs, Patient to call before getting OOB, PT Consult for mobility concerns, PT Consult for assist device competence, Strengthening exercises (ROM-active/passive), Utilize walker, cane, or other assistive device, Bed/chair exit alarm    Mentation Interventions: Adequate sleep, hydration, pain control, Door open when patient unattended, Evaluate medications/consider consulting pharmacy, Eyeglasses and hearing aids, Familiar objects from home, Increase mobility, More frequent rounding, Reorient patient, Room close to nurse's station, Toileting rounds, Update white board, Bed/chair exit alarm    Medication Interventions: Assess postural VS orthostatic hypotension, Evaluate medications/consider consulting pharmacy, Patient to call before getting OOB, Teach patient to arise slowly, Bed/chair exit alarm    Elimination Interventions: Call light in reach, Bed/chair exit alarm, Patient to call for help with toileting needs, Stay With Me (per policy), Toilet paper/wipes in reach, Toileting schedule/hourly rounds    History of Falls Interventions: Bed/chair exit alarm, Consult care management for discharge planning, Door open when patient unattended, Evaluate medications/consider consulting pharmacy, Investigate reason for fall, Room close to nurse's station, Utilize gait belt for transfer/ambulation, Assess for delayed presentation/identification of injury for 48 hrs (comment for end date)         Problem: Patient Education: Go to Patient Education Activity  Goal: Patient/Family Education  Outcome: Progressing Towards Goal     Problem: Pressure Injury - Risk of  Goal: *Prevention of pressure injury  Description: Document Advid Scale and appropriate interventions in the flowsheet. Outcome: Progressing Towards Goal  Note: Pressure Injury Interventions:  Sensory Interventions: Assess changes in LOC, Assess need for specialty bed, Avoid rigorous massage over bony prominences, Check visual cues for pain, Discuss PT/OT consult with provider, Float heels, Keep linens dry and wrinkle-free, Maintain/enhance activity level, Minimize linen layers, Monitor skin under medical devices, Pad between skin to skin, Pressure redistribution bed/mattress (bed type), Sit a 90-degree angle/use footstool if needed, Turn and reposition approx. every two hours (pillows and wedges if needed)    Moisture Interventions: Absorbent underpads, Check for incontinence Q2 hours and as needed, Internal/External fecal devices, Internal/External urinary devices, Limit adult briefs, Maintain skin hydration (lotion/cream), Minimize layers, Moisture barrier    Activity Interventions: Assess need for specialty bed, Increase time out of bed, Pressure redistribution bed/mattress(bed type), PT/OT evaluation    Mobility Interventions: Assess need for specialty bed, HOB 30 degrees or less, Pressure redistribution bed/mattress (bed type), PT/OT evaluation, Turn and reposition approx.  every two hours(pillow and wedges)    Nutrition Interventions: Document food/fluid/supplement intake, Offer support with meals,snacks and hydration    Friction and Shear Interventions: Apply protective barrier, creams and emollients, Foam dressings/transparent film/skin sealants, HOB 30 degrees or less, Lift sheet, Lift team/patient mobility team, Minimize layers, Transfer aides:transfer board/Chuck lift/ceiling lift, Transferring/repositioning devices                Problem: Patient Education: Go to Patient Education Activity  Goal: Patient/Family Education  Outcome: Progressing Towards Goal     Problem: Pain  Goal: *Control of Pain  Outcome: Progressing Towards Goal  Goal: *PALLIATIVE CARE:  Alleviation of Pain  Outcome: Progressing Towards Goal     Problem: Patient Education: Go to Patient Education Activity  Goal: Patient/Family Education  Outcome: Progressing Towards Goal     Problem: Alcohol Withdrawal  Goal: *STG: Participates in treatment plan  Outcome: Progressing Towards Goal  Goal: *STG: Remains safe in hospital  Outcome: Progressing Towards Goal  Goal: *STG: Seeks staff when symptoms of withdrawal increase  Outcome: Progressing Towards Goal  Goal: *STG: Complies with medication therapy  Outcome: Progressing Towards Goal  Goal: *STG: Attends activities and groups  Outcome: Progressing Towards Goal  Goal: *STG: Will identify negative impact of chemical dependency including the use of tobacco, alcohol, and other substances  Outcome: Progressing Towards Goal  Goal: *STG: Verbalizes abstinence as an achievable goal  Outcome: Progressing Towards Goal  Goal: *STG: Agrees to participate in outpatient after care program to support ongoing mental health  Outcome: Progressing Towards Goal  Goal: *STG: Able to indentify relapse triggers including interpersonal/social and familial factors  Outcome: Progressing Towards Goal  Goal: *STG: Identify lifestyle changes to support long term sobriety such as vocation, employment, education, and legal issues  Outcome: Progressing Towards Goal  Goal: *STG: Maintains appropriate nutrition and hydration  Outcome: Progressing Towards Goal  Goal: *STG: Vital signs within defined limits  Outcome: Progressing Towards Goal  Goal: *STG/LTG: Relapse prevention plan in place to include housing/aftercare, leisure activities, and spirituality  Outcome: Progressing Towards Goal  Goal: Interventions  Outcome: Progressing Towards Goal     Problem: Patient Education: Go to Patient Education Activity  Goal: Patient/Family Education  Outcome: Progressing Towards Goal     Problem: Delirium Treatment  Goal: *Level of consciousness restored to baseline  Outcome: Progressing Towards Goal  Goal: *Level of environmental perceptions restored to baseline  Outcome: Progressing Towards Goal  Goal: *Sensory perception restored to baseline  Outcome: Progressing Towards Goal  Goal: *Emotional stability restored to baseline  Outcome: Progressing Towards Goal  Goal: *Functional assessment restored to baseline  Outcome: Progressing Towards Goal  Goal: *Absence of falls  Outcome: Progressing Towards Goal  Goal: *Will remain free of delirium, CAM Score negative  Outcome: Progressing Towards Goal  Goal: *Cognitive status will be restored to baseline  Outcome: Progressing Towards Goal  Goal: Interventions  Outcome: Progressing Towards Goal     Problem: Patient Education: Go to Patient Education Activity  Goal: Patient/Family Education  Outcome: Progressing Towards Goal

## 2020-05-12 NOTE — PROGRESS NOTES
[facility]  Hospitalist Group                                                                                          Hospitalist Progress Note  Viktor Hernandez MD  Answering service: [phone] from in house phone        Date of Service:  2020  NAME:  Jared Moses. :  1980  MRN:  [id number]    Admission Summary:     Patient initially admitted on , had worsening widrawal syndrome requiring transfer to ICU. Now stable for transfer out of the ICU. Seen and examined at the bedside. Patient not on COVID-19 precautions  Weaned off Precedex infusion today. Stable vitals. Mentation remains very slow. Not agitated. Denies pain or dyspnea. Interval history / Subjective:   Patient feels well, no fever since yesterday, patient states he wants to leave Against Medical Advice. I spent lengthy time counseling him, and explained risks he will be taking including sepsis and death. I encouraged him to take Abx course 'which I will prescribe'. And to call our unit to get results of cultures in 2days. He insists he wants to leave AMA, and signed AMA document. Assessment & Plan:     Fever: no sepsis  -obtained blood cultures, CXR no infiltrates. -UA showed 10-20 WBCs and positive nitrities- likely has UTI  -started rocephin. -Urine cultures pending    Alcohol withdrawal with h/o chronic alcoholism - resolved  -off benzos now  -Haldol PRN for agitations  -Continue thiamine and folic acid  - consulted for providing alcohol rehab resources  -Discussed with psych NP- as patient has alcohol withdrawal symptoms resolved- recommended OP follow up.     Hypokalemia Replete as needed  Generalized weakness -PT/OT eval    Code status: FULL  DVT prophylaxis: Lovenox  Care Plan discussed with: Patient/Family and Nurse  Anticipated Disposition: TBD  Anticipated Discharge: 24 hours to 48 hours     Hospital Problems  Date Reviewed: 5/3/2020          Codes Class Noted POA    * (Principal) Alcohol withdrawal syndrome [facility]) ICD-10-CM: K93.302  ICD-9-CM: 291.81  5/3/2020 Yes                Review of Systems:   A comprehensive review of systems was negative except for that written in the HPI. Vital Signs:    Last 24hrs VS reviewed since prior progress note. Most recent are:  Visit Vitals  /53 (BP 1 Location: Left arm, BP Patient Position: At rest)   Pulse 86   Temp 98.4 °F (36.9 °C)   Resp 20   Ht 6' 1\" (1.854 m)   Wt 91.2 kg (201 lb 1 oz)   SpO2 97%   BMI 26.53 kg/m²         Intake/Output Summary (Last 24 hours) at 5/12/2020 1220  Last data filed at 5/12/2020 1006  Gross per 24 hour   Intake    Output 1050 ml   Net -1050 ml        Physical Examination:             Constitutional:  mild distress   ENT:  Oral mucosa moist, oropharynx benign,EOMI. Resp:  clear to auscultation b/l   CV:  Regular rhythm, normal rate,S1, S2 wnl    GI:  Soft, non distended, non tender. normoactive bowel sounds    Musculoskeletal:  No LE edema    Neurologic:  alert and oriented X 2, moves all extremities. Skin:  Good turgor, no rashes or ulcers       Data Review:    Review and/or order of clinical lab test,radiology      Labs:     Recent Labs     05/12/20  0252 05/11/20  0520   WBC 14.7* 11.9*   HGB 12.3 14.6   HCT 37.3 43.9    206     Recent Labs     05/12/20  0252 05/11/20  0520 05/10/20  0205    135* 135*   K 3.6 3.6 3.5    102 104   CO2 27 26 24   BUN 7 6 6   CREA 0.80 0.93 0.76   GLU 87 95 95   CA 9.1 9.0 9.5   PHOS 3.4 3.7 3.2     No results for input(s): SGOT, GPT, ALT, AP, TBIL, TBILI, TP, ALB, GLOB, GGT, AML, LPSE in the last 72 hours. No lab exists for component: AMYP, HLPSE  No results for input(s): INR, PTP, APTT, INREXT, INREXT in the last 72 hours. No results for input(s): FE, TIBC, PSAT, FERR in the last 72 hours. No results found for: FOL, RBCF   No results for input(s): PH, PCO2, PO2 in the last 72 hours.   No results for input(s): CPK, CKNDX, TROIQ in the last 72 hours.     No lab exists for component: CPKMB  Lab Results   Component Value Date/Time    Cholesterol, total 108 05/04/2020 03:34 AM    HDL Cholesterol 36 05/04/2020 03:34 AM    LDL, calculated 27 05/04/2020 03:34 AM    Triglyceride 225 (H) 05/04/2020 03:34 AM    CHOL/HDL Ratio 3.0 05/04/2020 03:34 AM     No results found for: GLUCPOC  Lab Results   Component Value Date/Time    Color YELLOW/STRAW 05/11/2020 11:15 AM    Appearance CLOUDY (A) 05/11/2020 11:15 AM    Specific gravity 1.016 05/11/2020 11:15 AM    Specific gravity 1.025 05/03/2020 05:58 PM    pH (UA) 6.5 05/11/2020 11:15 AM    Protein Negative 05/11/2020 11:15 AM    Glucose 500 (A) 05/11/2020 11:15 AM    Ketone Negative 05/11/2020 11:15 AM    Bilirubin Negative 05/11/2020 11:15 AM    Urobilinogen 1.0 05/11/2020 11:15 AM    Nitrites Positive (A) 05/11/2020 11:15 AM    Leukocyte Esterase MODERATE (A) 05/11/2020 11:15 AM    Epithelial cells FEW 05/11/2020 11:15 AM    Bacteria 4+ (A) 05/11/2020 11:15 AM    WBC 10-20 05/11/2020 11:15 AM    RBC 0-5 05/11/2020 11:15 AM         Medications Reviewed:     Current Facility-Administered Medications   Medication Dose Route Frequency    cefTRIAXone (ROCEPHIN) 1 g in 0.9% sodium chloride (MBP/ADV) 50 mL  1 g IntraVENous Q24H    cholecalciferol (VITAMIN D3) (1000 Units /25 mcg) tablet 5 Tab  5,000 Units Oral DAILY    traZODone (DESYREL) tablet 100 mg  100 mg Oral QHS    gabapentin (NEURONTIN) capsule 300 mg  300 mg Oral BID    nitroglycerin (NITROLINGUAL) sublingual 0.4 mg/spray  1 Spray SubLINGual Q5MIN PRN    thiamine HCL (B-1) tablet 100 mg  100 mg Oral DAILY    folic acid (FOLVITE) tablet 1 mg  1 mg Oral DAILY    enoxaparin (LOVENOX) injection 40 mg  40 mg SubCUTAneous Q24H    hydrALAZINE (APRESOLINE) 20 mg/mL injection 10 mg  10 mg IntraVENous Q6H PRN    sodium chloride (NS) flush 5-40 mL  5-40 mL IntraVENous Q8H    sodium chloride (NS) flush 5-40 mL  5-40 mL IntraVENous PRN    acetaminophen (TYLENOL) tablet 650 mg  650 mg Oral Q4H PRN    ondansetron (ZOFRAN) injection 4 mg  4 mg IntraVENous Q4H PRN    bisacodyL (DULCOLAX) tablet 5 mg  5 mg Oral DAILY PRN    nicotine (NICODERM CQ) 21 mg/24 hr patch 1 Patch  1 Patch TransDERmal Q24H     ______________________________________________________________________  EXPECTED LENGTH OF STAY: 3d 9h  ACTUAL LENGTH OF STAY:          Kenroy Houston MD

## 2020-05-12 NOTE — PROGRESS NOTES
Problem: Falls - Risk of  Goal: *Absence of Falls  Description: Document Ronnell Oro Fall Risk and appropriate interventions in the flowsheet.   Outcome: Progressing Towards Goal  Note: Fall Risk Interventions:  Mobility Interventions: Assess mobility with egress test, Communicate number of staff needed for ambulation/transfer, OT consult for ADLs, Patient to call before getting OOB, PT Consult for mobility concerns, PT Consult for assist device competence, Strengthening exercises (ROM-active/passive), Utilize walker, cane, or other assistive device, Bed/chair exit alarm    Mentation Interventions: Adequate sleep, hydration, pain control, Door open when patient unattended, Evaluate medications/consider consulting pharmacy, Eyeglasses and hearing aids, Familiar objects from home, Increase mobility, More frequent rounding, Reorient patient, Room close to nurse's station, Toileting rounds, Update white board, Bed/chair exit alarm    Medication Interventions: Assess postural VS orthostatic hypotension, Evaluate medications/consider consulting pharmacy, Patient to call before getting OOB, Teach patient to arise slowly, Bed/chair exit alarm    Elimination Interventions: Call light in reach, Bed/chair exit alarm, Patient to call for help with toileting needs, Stay With Me (per policy), Toilet paper/wipes in reach, Toileting schedule/hourly rounds    History of Falls Interventions: Bed/chair exit alarm, Consult care management for discharge planning, Door open when patient unattended, Evaluate medications/consider consulting pharmacy, Investigate reason for fall, Room close to nurse's station, Utilize gait belt for transfer/ambulation, Assess for delayed presentation/identification of injury for 48 hrs (comment for end date)         Problem: Patient Education: Go to Patient Education Activity  Goal: Patient/Family Education  Outcome: Progressing Towards Goal     Problem: Pressure Injury - Risk of  Goal: *Prevention of pressure injury  Description: Document David Scale and appropriate interventions in the flowsheet. Outcome: Progressing Towards Goal  Note: Pressure Injury Interventions:  Sensory Interventions: Assess changes in LOC, Assess need for specialty bed, Avoid rigorous massage over bony prominences, Check visual cues for pain, Discuss PT/OT consult with provider, Float heels, Keep linens dry and wrinkle-free, Maintain/enhance activity level, Minimize linen layers, Monitor skin under medical devices, Pad between skin to skin, Pressure redistribution bed/mattress (bed type), Sit a 90-degree angle/use footstool if needed, Turn and reposition approx. every two hours (pillows and wedges if needed)    Moisture Interventions: Absorbent underpads, Check for incontinence Q2 hours and as needed, Internal/External fecal devices, Internal/External urinary devices, Limit adult briefs, Maintain skin hydration (lotion/cream), Minimize layers, Moisture barrier    Activity Interventions: Assess need for specialty bed, Increase time out of bed, Pressure redistribution bed/mattress(bed type), PT/OT evaluation    Mobility Interventions: Assess need for specialty bed, HOB 30 degrees or less, Pressure redistribution bed/mattress (bed type), PT/OT evaluation, Turn and reposition approx.  every two hours(pillow and wedges)    Nutrition Interventions: Document food/fluid/supplement intake, Offer support with meals,snacks and hydration    Friction and Shear Interventions: Apply protective barrier, creams and emollients, Foam dressings/transparent film/skin sealants, HOB 30 degrees or less, Lift sheet, Lift team/patient mobility team, Minimize layers, Transfer aides:transfer board/Chuck lift/ceiling lift, Transferring/repositioning devices                Problem: Patient Education: Go to Patient Education Activity  Goal: Patient/Family Education  Outcome: Progressing Towards Goal     Problem: Pain  Goal: *Control of Pain  Outcome: Progressing Towards Goal     Problem: Alcohol Withdrawal  Goal: *STG: Participates in treatment plan  Outcome: Progressing Towards Goal  Goal: *STG: Remains safe in hospital  Outcome: Progressing Towards Goal  Goal: *STG: Seeks staff when symptoms of withdrawal increase  Outcome: Progressing Towards Goal  Goal: *STG: Complies with medication therapy  Outcome: Progressing Towards Goal  Goal: *STG: Attends activities and groups  Outcome: Progressing Towards Goal  Goal: *STG: Will identify negative impact of chemical dependency including the use of tobacco, alcohol, and other substances  Outcome: Progressing Towards Goal  Goal: *STG: Verbalizes abstinence as an achievable goal  Outcome: Progressing Towards Goal  Goal: *STG: Agrees to participate in outpatient after care program to support ongoing mental health  Outcome: Progressing Towards Goal  Goal: *STG: Able to indentify relapse triggers including interpersonal/social and familial factors  Outcome: Progressing Towards Goal  Goal: *STG: Identify lifestyle changes to support long term sobriety such as vocation, employment, education, and legal issues  Outcome: Progressing Towards Goal  Goal: *STG: Maintains appropriate nutrition and hydration  Outcome: Progressing Towards Goal  Goal: *STG: Vital signs within defined limits  Outcome: Progressing Towards Goal  Goal: *STG/LTG: Relapse prevention plan in place to include housing/aftercare, leisure activities, and spirituality  Outcome: Progressing Towards Goal  Goal: Interventions  Outcome: Progressing Towards Goal     Problem: Delirium Treatment  Goal: *Level of consciousness restored to baseline  Outcome: Progressing Towards Goal  Goal: *Level of environmental perceptions restored to baseline  Outcome: Progressing Towards Goal  Goal: *Sensory perception restored to baseline  Outcome: Progressing Towards Goal  Goal: *Emotional stability restored to baseline  Outcome: Progressing Towards Goal  Goal: *Functional assessment restored to baseline  Outcome: Progressing Towards Goal  Goal: *Absence of falls  Outcome: Progressing Towards Goal  Goal: *Will remain free of delirium, CAM Score negative  Outcome: Progressing Towards Goal  Goal: *Cognitive status will be restored to baseline  Outcome: Progressing Towards Goal  Goal: Interventions  Outcome: Progressing Towards Goal

## 2020-05-12 NOTE — CONSULTS
Patient is being treated for acohol dependence, now resolved. He can look into substance abuse outpatient treatment program, such as [facility] or inpatient treatment program that can be offered at the [address] [place] [address], [facility], the [facility]. He can also look into [address] or any local b for free outpatient services. Case management needs to be involved and make arrangements for patient.

## 2020-05-12 NOTE — PROGRESS NOTES
Bedside shift change report given to Christine Irene RN (oncoming nurse) by Preston Chopra RN (offgoing nurse). Report included the following information SBAR, Kardex, ED Summary, Procedure Summary, Intake/Output, MAR, Accordion, Recent Results, Cardiac Rhythm SR and Dual Neuro Assessment.

## 2020-05-12 NOTE — DISCHARGE SUMMARY
Discharging against medical advice. patient states he wants to leave Against Medical Advice. I spent lengthy time counseling him, and explained risks he will be taking including sepsis and death. I encouraged him to take Abx course 'which I will prescribe'. And to call our unit to get results of cultures in 2days. He insists he wants to leave AMA, and signed AMA document. Urine cultures pending, Blood cultures pending.  Patient left AMA

## 2020-05-12 NOTE — PROGRESS NOTES
Patient leaving against medical advice. I have reviewed discharge instructions with the patient. The patient verbalized understanding. PIV and telemetry discontinued. Patient discharged to home via family with prescriptions and belongings.

## 2020-05-12 NOTE — CDMP QUERY
Pt admitted with ETOH withdrawal. Pt noted to have elevated WBC, fever, and +4 bacteria in urine. If possible, please document in the progress notes and discharge summary if you are evaluating and /or treating any of the following: 
 
? Sepsis, not present on admission, causative organism __________ (please specify) ? Sepsis, present on admission, causative organism ____________ (please specify) ? SIRS due to localized infection only _________ (please specify) ? Other, please specify ? Clinically unable to determine The medical record reflects the following: 
   Risk Factors: To [facility] with ETOH withdrawal with DT's Clinical Indicators: 5/.8, 101.8, WBC Per PN-ever: -obtained blood cultures, CXR no infiltrates. -UA showed 10-20 WBCs and positive nitrities- likely has UTI -started rocephin. -Urine cultures pending Treatment: Monitor imaging, labwork, BC, vital signs, IV Rocephin Thank you, Mellissa Garrett BSN, RN 
[facility]  
[phone]

## 2020-05-12 NOTE — PROGRESS NOTES
PILAR:    Patient is not medically stable to discharge at this time. Psychiatry consulted; Recommended substance abuse outpatient treatment programs. CM provided resources to [facility] for Same day access services. Continue IV abx     Home Health recommended. Patient listed as self-pay and no PCP on chart. CM will make referral to [facility] for [address] [facility] once orders received. CM to follow.  Masoud Licea, TERRELL,CRM

## 2020-05-13 LAB
BACTERIA SPEC CULT: ABNORMAL
CC UR VC: ABNORMAL
SERVICE CMNT-IMP: ABNORMAL

## 2020-05-16 LAB
BACTERIA SPEC CULT: NORMAL
SERVICE CMNT-IMP: NORMAL

## 2020-06-15 NOTE — TELEPHONE ENCOUNTER
Patient called and left a voicemail asking to be referred to behavioral health. He is having some issues (mom is dying and dad just  not too long ago).   Please call patient to refer and please let me know when patient schedules appointment, so I can do his insurance referral. melisa

## 2021-01-27 NOTE — ED PROVIDER NOTES
HPI Comments: Ana Marques. is a 42 y.o. male with PMHx significant for chronic back pain, spinal fusion, HTN, who presents ambulatory to [facility] ED with cc of exacerbation of chronic lower back pain onset last night. Pt reports R lateral lumbar pain radiating into his RLE. He states he was working in his yard all last week and had been sitting for long periods of time in an uncomfortable chair for the past couple of days. Pt also reports hearing a \"pop\" in his lower back. He states he has been previously treated with oral steroids, pain medication, and muscle relaxers. Pt reports receiving treatment from his PCP for his symptoms and states he has an appointment scheduled on 8/4/2017 with an orthopedist. He denies any recent injuries. PCP: Kang Alva MD      Social History: (-) Tobacco, (-) EtOH, (-) Illicit Drugs         There are no other complaints, changes, or physical findings at this time. The history is provided by the patient. No  was used.         Past Medical History:   Diagnosis Date    Alcoholism ([address]     in remission    Back pain     Chronic pain     back    Concussion     H/O spinal fusion     15 years ago    Hypertension        Past Surgical History:   Procedure Laterality Date    HX ORTHOPAEDIC      Spinal fusion L5 S1 in 2003         Family History:   Problem Relation Age of Onset    Diabetes Mother     Hypertension Mother     Thyroid Disease Mother      hypothyroidism    Heart Attack Mother      age 64-smoker    [address] Arthritis-osteo Mother     Asthma Mother     Heart Disease Mother     Elevated Lipids Father     Heart Disease Father     Hypertension Father     Diabetes Father     Cancer Father      prostate    Heart Attack Father      in 52's    Alcohol abuse Father     Arthritis-osteo Father     Psychiatric Disorder Father        Social History     Social History    Marital status:      Spouse name: N/A    Number of children: anemia, GI bleed N/A    Years of education: N/A     Occupational History    Not on file. Social History Main Topics    Smoking status: Former Smoker     Packs/day: 1.00     Years: 10.00     Types: Cigarettes     Quit date: 4/8/2014    Smokeless tobacco: Never Used    Alcohol use No    Drug use: No    Sexual activity: Yes     Partners: Female     Birth control/ protection: None     Other Topics Concern    Not on file     Social History Narrative         ALLERGIES: Review of patient's allergies indicates no known allergies. Review of Systems   Constitutional: Negative for chills and fever. HENT: Negative for congestion, rhinorrhea and sore throat. Respiratory: Negative for cough and shortness of breath. Cardiovascular: Negative for chest pain and palpitations. Gastrointestinal: Negative for abdominal pain, diarrhea, nausea and vomiting. Genitourinary: Negative for dysuria and hematuria. Musculoskeletal: Positive for back pain and myalgias (RLE). Negative for neck pain and neck stiffness. Skin: Negative for rash and wound. Neurological: Negative for dizziness and headaches. Psychiatric/Behavioral: Negative for agitation and confusion. Vitals:    07/29/17 1855   BP: 136/76   Pulse: 77   Resp: 16   Temp: 97.8 °F (36.6 °C)   SpO2: 97%   Weight: 106.6 kg (235 lb)   Height: 6' 1\" (1.854 m)            Physical Exam   Constitutional: He is oriented to person, place, and time. He appears well-developed and well-nourished. No distress. HENT:   Head: Normocephalic and atraumatic. Nose: Nose normal.   Mouth/Throat: Oropharynx is clear and moist. No oropharyngeal exudate. Eyes: Conjunctivae and EOM are normal. Right eye exhibits no discharge. Left eye exhibits no discharge. No scleral icterus. Neck: Normal range of motion. Neck supple. No JVD present. No tracheal deviation present. No thyromegaly present.    No cervical spine tenderness   Cardiovascular: Normal rate, regular rhythm and normal heart anemia sounds. Pulmonary/Chest: Effort normal and breath sounds normal. No respiratory distress. He has no wheezes. Abdominal: Soft. There is no tenderness. Musculoskeletal: He exhibits no edema. Decreased active/passive ROM of lumbar spine due to tenderness  + SLR on the R   Lymphadenopathy:     He has no cervical adenopathy. Neurological: He is alert and oriented to person, place, and time. He exhibits normal muscle tone. Coordination normal.   NVI   Skin: Skin is warm and dry. He is not diaphoretic. Psychiatric: He has a normal mood and affect. His behavior is normal. Judgment normal.   Nursing note and vitals reviewed. MDM  Number of Diagnoses or Management Options  Acute exacerbation of chronic low back pain:   Anxiety:   History of spinal fusion:   Nicotine dependence, uncomplicated, unspecified nicotine product type:   Diagnosis management comments: DDx: strain, spasm, exacerbation of chronic pain        Amount and/or Complexity of Data Reviewed  Review and summarize past medical records: yes    Patient Progress  Patient progress: stable    ED Course       Procedures    MEDICATIONS GIVEN:  Medications   predniSONE (DELTASONE) tablet 60 mg (60 mg Oral Given 7/29/17 1943)   promethazine (PHENERGAN) tablet 25 mg (25 mg Oral Given 7/29/17 1944)   HYDROmorphone (PF) (DILAUDID) injection 1 mg (1 mg IntraMUSCular Given 7/29/17 1944)   diazePAM (VALIUM) tablet 5 mg (5 mg Oral Given 7/29/17 1943)       IMPRESSION:  1. Acute exacerbation of chronic low back pain    2. History of spinal fusion    3. Nicotine dependence, uncomplicated, unspecified nicotine product type    4. Anxiety        PLAN:  1. Current Discharge Medication List      START taking these medications    Details   oxyCODONE-acetaminophen (PERCOCET) 5-325 mg per tablet Take 1 Tab by mouth every six (6) hours as needed for Pain for up to 12 doses. Max Daily Amount: 4 Tabs.   Qty: 12 Tab, Refills: 0      !! methocarbamol (ROBAXIN) 750 mg tablet Take 1 Tab by mouth three (3) times daily for 5 days. Qty: 15 Tab, Refills: 0      predniSONE (STERAPRED) 5 mg dose pack See administration instruction per 5mg dose pack  Qty: 21 Tab, Refills: 0       !! - Potential duplicate medications found. Please discuss with provider. CONTINUE these medications which have NOT CHANGED    Details   !! methocarbamol (ROBAXIN-750) 750 mg tablet Take 1 Tab by mouth four (4) times daily as needed. Indications: MUSCLE SPASM  Qty: 16 Tab, Refills: 0       !! - Potential duplicate medications found. Please discuss with provider. 2.   Follow-up Information     Follow up With Details Comments Arron [address] [place] [address], [address] [place] [address]  [phone]      your Orthopedist       [facility]ospit[facility] EMERGENCY DEPT  If symptoms worsen [address]  [address] [facility]  [phone]          Return to ED if worse     Discharge Note:  7:56 PM  The patient has been re-evaluated and is ready for discharge. Reviewed available results with patient. Counseled patient on diagnosis and care plan. Patient has expressed understanding, and all questions have been answered. Patient agrees with plan and agrees to follow up as recommended, or to return to the ED if their symptoms worsen. Discharge instructions have been provided and explained to the patient, along with reasons to return to the ED. Written by Tino Buckner, ED Scribe, as dictated by Elisa Brownlee. This note is prepared by Tino Buckner, acting as Scribe for Elisa Brownlee. MERI Alejandra: The scribe's documentation has been prepared under my direction and personally reviewed by me in its entirety. I confirm that the note above accurately reflects all work, treatment, procedures, and medical decision making performed by me.

## 2022-03-19 PROBLEM — F10.939 ALCOHOL WITHDRAWAL SYNDROME (HCC): Status: ACTIVE | Noted: 2020-05-03

## 2022-03-19 PROBLEM — R07.9 CHEST PAIN: Status: ACTIVE | Noted: 2017-08-31

## 2022-04-24 ENCOUNTER — HOSPITAL ENCOUNTER (EMERGENCY)
Age: 42
Discharge: HOME | End: 2022-04-24
Attending: EMERGENCY MEDICINE
Payer: COMMERCIAL

## 2022-04-24 VITALS
HEART RATE: 104 BPM | BODY MASS INDEX: 19.88 KG/M2 | HEIGHT: 73 IN | TEMPERATURE: 98 F | WEIGHT: 150 LBS | SYSTOLIC BLOOD PRESSURE: 127 MMHG | OXYGEN SATURATION: 100 % | RESPIRATION RATE: 20 BRPM | DIASTOLIC BLOOD PRESSURE: 77 MMHG

## 2022-04-24 DIAGNOSIS — F10.20 UNCOMPLICATED ALCOHOL DEPENDENCE (HCC): Primary | ICD-10-CM

## 2022-04-24 DIAGNOSIS — R11.2 NAUSEA AND VOMITING IN ADULT: ICD-10-CM

## 2022-04-24 DIAGNOSIS — N17.9 AKI (ACUTE KIDNEY INJURY) (HCC): ICD-10-CM

## 2022-04-24 LAB
ALBUMIN SERPL-MCNC: 4.7 G/DL (ref 3.5–5)
ALBUMIN/GLOB SERPL: 1.2 {RATIO} (ref 1.1–2.2)
ALP SERPL-CCNC: 57 U/L (ref 45–117)
ALT SERPL-CCNC: 19 U/L (ref 12–78)
ANION GAP SERPL CALC-SCNC: 9 MMOL/L (ref 5–15)
AST SERPL-CCNC: 16 U/L (ref 15–37)
BASOPHILS # BLD: 0 K/UL (ref 0–0.1)
BASOPHILS NFR BLD: 0 % (ref 0–1)
BILIRUB SERPL-MCNC: 0.4 MG/DL (ref 0.2–1)
BUN SERPL-MCNC: 14 MG/DL (ref 6–20)
BUN/CREAT SERPL: 8 (ref 12–20)
CALCIUM SERPL-MCNC: 9.8 MG/DL (ref 8.5–10.1)
CHLORIDE SERPL-SCNC: 105 MMOL/L (ref 97–108)
CO2 SERPL-SCNC: 26 MMOL/L (ref 21–32)
CREAT SERPL-MCNC: 1.67 MG/DL (ref 0.7–1.3)
DIFFERENTIAL METHOD BLD: ABNORMAL
EOSINOPHIL # BLD: 0 K/UL (ref 0–0.4)
EOSINOPHIL NFR BLD: 0 % (ref 0–7)
ERYTHROCYTE [DISTWIDTH] IN BLOOD BY AUTOMATED COUNT: 12.7 % (ref 11.5–14.5)
ETHANOL SERPL-MCNC: <10 MG/DL
GLOBULIN SER CALC-MCNC: 4 G/DL (ref 2–4)
GLUCOSE SERPL-MCNC: 120 MG/DL (ref 65–100)
HCT VFR BLD AUTO: 47.7 % (ref 36.6–50.3)
HGB BLD-MCNC: 16 G/DL (ref 12.1–17)
IMM GRANULOCYTES # BLD AUTO: 0 K/UL (ref 0–0.04)
IMM GRANULOCYTES NFR BLD AUTO: 0 % (ref 0–0.5)
LYMPHOCYTES # BLD: 1.1 K/UL (ref 0.8–3.5)
LYMPHOCYTES NFR BLD: 9 % (ref 12–49)
MAGNESIUM SERPL-MCNC: 2.7 MG/DL (ref 1.6–2.4)
MCH RBC QN AUTO: 30.9 PG (ref 26–34)
MCHC RBC AUTO-ENTMCNC: 33.5 G/DL (ref 30–36.5)
MCV RBC AUTO: 92.3 FL (ref 80–99)
MONOCYTES # BLD: 0.4 K/UL (ref 0–1)
MONOCYTES NFR BLD: 4 % (ref 5–13)
NEUTS SEG # BLD: 10.2 K/UL (ref 1.8–8)
NEUTS SEG NFR BLD: 87 % (ref 32–75)
NRBC # BLD: 0 K/UL (ref 0–0.01)
NRBC BLD-RTO: 0 PER 100 WBC
PLATELET # BLD AUTO: 279 K/UL (ref 150–400)
PMV BLD AUTO: 10.4 FL (ref 8.9–12.9)
POTASSIUM SERPL-SCNC: 3.7 MMOL/L (ref 3.5–5.1)
PROT SERPL-MCNC: 8.7 G/DL (ref 6.4–8.2)
RBC # BLD AUTO: 5.17 M/UL (ref 4.1–5.7)
SODIUM SERPL-SCNC: 140 MMOL/L (ref 136–145)
WBC # BLD AUTO: 11.8 K/UL (ref 4.1–11.1)

## 2022-04-24 PROCEDURE — 96374 THER/PROPH/DIAG INJ IV PUSH: CPT

## 2022-04-24 PROCEDURE — 83735 ASSAY OF MAGNESIUM: CPT

## 2022-04-24 PROCEDURE — 74011250636 HC RX REV CODE- 250/636: Performed by: EMERGENCY MEDICINE

## 2022-04-24 PROCEDURE — 36415 COLL VENOUS BLD VENIPUNCTURE: CPT

## 2022-04-24 PROCEDURE — 99284 EMERGENCY DEPT VISIT MOD MDM: CPT

## 2022-04-24 PROCEDURE — 85025 COMPLETE CBC W/AUTO DIFF WBC: CPT

## 2022-04-24 PROCEDURE — 82077 ASSAY SPEC XCP UR&BREATH IA: CPT

## 2022-04-24 PROCEDURE — 96361 HYDRATE IV INFUSION ADD-ON: CPT

## 2022-04-24 PROCEDURE — 80053 COMPREHEN METABOLIC PANEL: CPT

## 2022-04-24 RX ORDER — ONDANSETRON 4 MG/1
4 TABLET, FILM COATED ORAL
Qty: 20 TABLET | Refills: 0 | Status: SHIPPED | OUTPATIENT
Start: 2022-04-24

## 2022-04-24 RX ORDER — LORAZEPAM 1 MG/1
1 TABLET ORAL
Qty: 20 TABLET | Refills: 0 | Status: SHIPPED | OUTPATIENT
Start: 2022-04-24

## 2022-04-24 RX ORDER — ONDANSETRON 2 MG/ML
4 INJECTION INTRAMUSCULAR; INTRAVENOUS
Status: COMPLETED | OUTPATIENT
Start: 2022-04-24 | End: 2022-04-24

## 2022-04-24 RX ADMIN — SODIUM CHLORIDE 1000 ML: 9 INJECTION, SOLUTION INTRAVENOUS at 15:23

## 2022-04-24 RX ADMIN — SODIUM CHLORIDE 1000 ML: 9 INJECTION, SOLUTION INTRAVENOUS at 13:56

## 2022-04-24 RX ADMIN — ONDANSETRON 4 MG: 2 INJECTION INTRAMUSCULAR; INTRAVENOUS at 13:56

## 2022-04-24 NOTE — ED NOTES
Patient alert skin warm dry pink talking to sister on cell phone, 1000cc ice water given sister will  , amb to [address] with steady even gait

## 2022-04-24 NOTE — ED NOTES
1340: Assumed care of patient from triage. Patient placed in stretcher. SR x 2. Call bell in reach. Monitor x 3. Patient alert but lethargic. Patient reports he is trying to detox from Heroin and Alcohol. Unsure of the last time he used either. Patient reports he is not a daily drinker.

## 2022-04-24 NOTE — ED NOTES
Pt states he drinks alcohol everyday, but stopped 2 days ago. Pt was trying to detox himself. Unsure of how much he drank daily. Last drink was last night. Dry heaving in triage at this time.

## 2022-04-24 NOTE — ED PROVIDER NOTES
EMERGENCY DEPARTMENT HISTORY AND PHYSICAL EXAM      Date: 4/24/2022  Patient Name: Tavo Wilson. History of Presenting Illness     Chief Complaint   Patient presents with    Alcohol intoxication     Pt arrives to ED via EMS with a cc of nausea and vomiting this am d/t drinking a 5th of vodka last night; EMS states that sister found pt unresponsive this am, but eventually he woke up with chills, tremor nausea nad vomiting; pt also states that he used heroin yesterday; pt states this is new to him as he has not received help in the past for alcohol intoxication        History Provided By: Patient    HPI: Tavo Wilson., 35 y.o. male presents to the ED with cc of alcohol abuse. Patient states that he normally drinks alcohol every day. He stopped 2 days ago, and attempted detox. He did start drinking again last night. According to the nursing note, he drank a 1/5 of vodka. His sister told EMS that he was unresponsive this morning, but eventually woke up and vomited. He said he vomited at least 4 or 5 times. He denies headache, abdominal pain, dizziness or lightheadedness. He denies headache, chest pain or shortness of breath. He said he would like to try to stop drinking. He also states that he took heroin for the first time yesterday. There are no other complaints, changes, or physical findings at this time. PCP: None    No current facility-administered medications on file prior to encounter. Current Outpatient Medications on File Prior to Encounter   Medication Sig Dispense Refill    MULTIVITAMIN PO Take  by mouth.  lidocaine (LIDODERM) 5 % 1 Patch by TransDERmal route every twenty-four (24) hours. Apply patch to the affected area for 12 hours a day and remove for 12 hours a day. Indications: pain 5 Patch 0    ibuprofen (MOTRIN) 600 mg tablet Take 1 Tab by mouth every six (6) hours as needed for Pain.  Indications: minor musculoskeletal injury, pain 30 Tab 0    diclofenac (VOLTAREN) 1 % gel APPLY 4 G TO AFFECTED AREA FOUR (4) TIMES DAILY. 100 g 2    LORazepam (ATIVAN) 0.5 mg tablet Take 1 Tab by mouth daily as needed for Anxiety. 30 Tab 0    QUEtiapine (SEROQUEL) 25 mg tablet Take 1 Tab by mouth two (2) times a day. 180 Tab 4    gabapentin (NEURONTIN) 300 mg capsule TAKE 3 CAPSULES BY MOUTH TWICE A DAY (Patient taking differently: TAKE 3 CAPSULES BY MOUTH THREE TIMES DAILY) 180 Cap 3    traZODone (DESYREL) 100 mg tablet Take 1 Tab by mouth nightly. 30 Tab 5    cholecalciferol, VITAMIN D3, (VITAMIN D3) 5,000 unit tab tablet Take 1 Tab by mouth daily.  80 Tab 3       Past History     Past Medical History:  Past Medical History:   Diagnosis Date    [address]ism [address] [address]     in remission    Back pain     Chronic pain     back    Concussion     H/O spinal fusion     15 years ago    Hypertension     Insomnia        Past Surgical History:  Past Surgical History:   Procedure Laterality Date    HX ORTHOPAEDIC      Spinal fusion L5 S1 in        Family History:  Family History   Problem Relation Age of Onset   [address] Diabetes Mother     Hypertension Mother     Thyroid Disease Mother         hypothyroidism    Heart Attack Mother         age 64-smoker    [address] OSTEOARTHRITIS Mother     Asthma Mother     Heart Disease Mother     Elevated Lipids Father     Heart Disease Father     Hypertension Father     Diabetes Father     Cancer Father         prostate    Heart Attack Father         in 54's    Alcohol abuse Father     OSTEOARTHRITIS Father     Psychiatric Disorder Father        Social History:  Social History     Tobacco Use    Smoking status: Former Smoker     Packs/day: 1.00     Years: 10.00     Pack years: 10.00     Types: Cigarettes     Quit date: 2014     Years since quittin.0    Smokeless tobacco: Current User   Substance Use Topics    Alcohol use: No    Drug use: No       Allergies:  No Known Allergies      Review of Systems   Review of Systems   Constitutional: Negative for fever. HENT: Negative for congestion. Eyes: Negative. Respiratory: Negative for shortness of breath. Cardiovascular: Negative for chest pain. Gastrointestinal: Positive for nausea and vomiting. Negative for abdominal pain. Endocrine: Negative for heat intolerance. Genitourinary: Negative. Musculoskeletal: Negative for back pain. Skin: Negative for rash. Allergic/Immunologic: Negative for immunocompromised state. Neurological: Negative for dizziness. Hematological: Does not bruise/bleed easily. Psychiatric/Behavioral: Negative. All other systems reviewed and are negative. Physical Exam   Physical Exam  Vitals and nursing note reviewed. Constitutional:       General: He is not in acute distress. Appearance: He is well-developed. Comments: Sleepy, but arousable   HENT:      Head: Normocephalic and atraumatic. Cardiovascular:      Rate and Rhythm: Normal rate and regular rhythm. Pulses: Normal pulses. Heart sounds: Normal heart sounds. Pulmonary:      Effort: Pulmonary effort is normal.      Breath sounds: Normal breath sounds. Abdominal:      General: Bowel sounds are normal.      Palpations: Abdomen is soft. Musculoskeletal:         General: Normal range of motion. Cervical back: Normal range of motion. Skin:     General: Skin is warm and dry. Neurological:      General: No focal deficit present. Mental Status: He is oriented to person, place, and time.       Coordination: Coordination normal.   Psychiatric:         Mood and Affect: Mood normal.         Behavior: Behavior normal.         Diagnostic Study Results     Labs -     Recent Results (from the past 12 hour(s))   CBC WITH AUTOMATED DIFF    Collection Time: 04/24/22  1:16 PM   Result Value Ref Range    WBC 11.8 (H) 4.1 - 11.1 K/uL    RBC 5.17 4.10 - 5.70 M/uL    HGB 16.0 12.1 - 17.0 g/dL    HCT 47.7 36.6 - 50.3 %    MCV 92.3 80.0 - 99.0 FL    MCH 30.9 26.0 - 34.0 PG MCHC 33.5 30.0 - 36.5 g/dL    RDW 12.7 11.5 - 14.5 %    PLATELET 505 504 - 638 K/uL    MPV 10.4 8.9 - 12.9 FL    NRBC 0.0 0  WBC    ABSOLUTE NRBC 0.00 0.00 - 0.01 K/uL    NEUTROPHILS 87 (H) 32 - 75 %    LYMPHOCYTES 9 (L) 12 - 49 %    MONOCYTES 4 (L) 5 - 13 %    EOSINOPHILS 0 0 - 7 %    BASOPHILS 0 0 - 1 %    IMMATURE GRANULOCYTES 0 0.0 - 0.5 %    ABS. NEUTROPHILS 10.2 (H) 1.8 - 8.0 K/UL    ABS. LYMPHOCYTES 1.1 0.8 - 3.5 K/UL    ABS. MONOCYTES 0.4 0.0 - 1.0 K/UL    ABS. EOSINOPHILS 0.0 0.0 - 0.4 K/UL    ABS. BASOPHILS 0.0 0.0 - 0.1 K/UL    ABS. IMM. GRANS. 0.0 0.00 - 0.04 K/UL    DF AUTOMATED     METABOLIC PANEL, COMPREHENSIVE    Collection Time: 04/24/22  1:16 PM   Result Value Ref Range    Sodium 140 136 - 145 mmol/L    Potassium 3.7 3.5 - 5.1 mmol/L    Chloride 105 97 - 108 mmol/L    CO2 26 21 - 32 mmol/L    Anion gap 9 5 - 15 mmol/L    Glucose 120 (H) 65 - 100 mg/dL    BUN 14 6 - 20 MG/DL    Creatinine 1.67 (H) 0.70 - 1.30 MG/DL    BUN/Creatinine ratio 8 (L) 12 - 20      GFR est AA 55 (L) >60 ml/min/1.73m2    GFR est non-AA 45 (L) >60 ml/min/1.73m2    Calcium 9.8 8.5 - 10.1 MG/DL    Bilirubin, total 0.4 0.2 - 1.0 MG/DL    ALT (SGPT) 19 12 - 78 U/L    AST (SGOT) 16 15 - 37 U/L    Alk. phosphatase 57 45 - 117 U/L    Protein, total 8.7 (H) 6.4 - 8.2 g/dL    Albumin 4.7 3.5 - 5.0 g/dL    Globulin 4.0 2.0 - 4.0 g/dL    A-G Ratio 1.2 1.1 - 2.2     ETHYL ALCOHOL    Collection Time: 04/24/22  1:16 PM   Result Value Ref Range    ALCOHOL(ETHYL),SERUM <10 <10 MG/DL   MAGNESIUM    Collection Time: 04/24/22  1:16 PM   Result Value Ref Range    Magnesium 2.7 (H) 1.6 - 2.4 mg/dL       Radiologic Studies -   No orders to display     CT Results  (Last 48 hours)    None        CXR Results  (Last 48 hours)    None          Medical Decision Making   I am the first provider for this patient.     I reviewed the vital signs, available nursing notes, past medical history, past surgical history, family history and social history. Vital Signs-Reviewed the patient's vital signs. Patient Vitals for the past 12 hrs:   Temp Pulse Resp BP SpO2   04/24/22 1312 98 °F (36.7 °C) (!) 104 20 127/77 100 %         Records Reviewed: Nursing Notes, Old Medical Records, Previous Radiology Studies and Previous Laboratory Studies    Provider Notes (Medical Decision Making):   Alcohol abuse, dehydration, electrolyte abnormality, gastritis    ED Course:   Initial assessment performed. The patients presenting problems have been discussed, and they are in agreement with the care plan formulated and outlined with them. I have encouraged them to ask questions as they arise throughout their visit. Progress note:    Patient is feeling better. He is exhibiting no signs of withdrawal.  He is advised to follow-up and return to ER if worse             Critical Care Time:   CRITICAL CARE NOTE :    2:08 PM    IMPENDING DETERIORATION -CNS, Metabolic and Renal  ASSOCIATED RISK FACTORS - Hypotension, Metabolic changes and Dehydration  MANAGEMENT- Bedside Assessment and Supervision of Care  INTERPRETATION -  Blood Pressure  INTERVENTIONS - hemodynamic mngmt and Metobolic interventions  CASE REVIEW - Nursing  TREATMENT RESPONSE -Improved  PERFORMED BY - Self    NOTES   :  I have spent 30 minutes of critical care time involved in lab review, consultations with specialist, family decision- making, bedside attention and documentation. This time excludes time spent in any separate billed procedures. During this entire length of time I was immediately available to the patient . Christine Hughes MD      Disposition:  home    DISCHARGE PLAN:  1.    Discharge Medication List as of 4/24/2022  4:21 PM      START taking these medications    Details   ondansetron hcl (Zofran) 4 mg tablet Take 1 Tablet by mouth every eight (8) hours as needed for Nausea., Normal, Disp-20 Tablet, R-0      LORazepam (Ativan) 1 mg tablet Take 1 Tablet by mouth every six (6) hours as needed for Anxiety. Max Daily Amount: 4 mg., Normal, Disp-20 Tablet, R-0         CONTINUE these medications which have NOT CHANGED    Details   MULTIVITAMIN PO Take  by mouth., Historical Med      lidocaine (LIDODERM) 5 % 1 Patch by TransDERmal route every twenty-four (24) hours. Apply patch to the affected area for 12 hours a day and remove for 12 hours a day. Indications: pain, Print, Disp-5 Patch, R-0      ibuprofen (MOTRIN) 600 mg tablet Take 1 Tab by mouth every six (6) hours as needed for Pain. Indications: minor musculoskeletal injury, pain, Print, Disp-30 Tab, R-0      diclofenac (VOLTAREN) 1 % gel APPLY 4 G TO AFFECTED AREA FOUR (4) TIMES DAILY., Normal, Disp-100 g, R-2      QUEtiapine (SEROQUEL) 25 mg tablet Take 1 Tab by mouth two (2) times a day., Normal, Disp-180 Tab, R-4      gabapentin (NEURONTIN) 300 mg capsule TAKE 3 CAPSULES BY MOUTH TWICE A DAY, Normal, Disp-180 Cap, R-3      traZODone (DESYREL) 100 mg tablet Take 1 Tab by mouth nightly., Normal, Disp-30 Tab, R-5      cholecalciferol, VITAMIN D3, (VITAMIN D3) 5,000 unit tab tablet Take 1 Tab by mouth daily. , Normal, Disp-90 Tab, R-3           2. Follow-up Information     Follow up With Specialties Details Why Contact Info       [phone] call [facility] tomorrow    [facility] EMERGENCY DEPT Emergency Medicine  If symptoms worsen [address]  [address] [facility]  [phone]        3. Return to ED if worse     Diagnosis     Clinical Impression:   1. Uncomplicated alcohol dependence ([address]    2. ELIZABETH (acute kidney injury) ([facility] [address]    3. Nausea and vomiting in adult        Attestations:    Mandie No MD    Please note that this dictation was completed with [employer], the "[employer] voice recognition software. Quite often unanticipated grammatical, syntax, homophones, and other interpretive errors are inadvertently transcribed by the computer software. Please disregard these errors. Please excuse any errors that have escaped final proofreading. Thank you.

## 2023-05-18 RX ORDER — GABAPENTIN 300 MG/1
CAPSULE ORAL
COMMUNITY
Start: 2017-10-25

## 2023-05-18 RX ORDER — QUETIAPINE FUMARATE 25 MG/1
25 TABLET, FILM COATED ORAL 2 TIMES DAILY
COMMUNITY
Start: 2017-11-06

## 2023-05-18 RX ORDER — ONDANSETRON 4 MG/1
4 TABLET, FILM COATED ORAL EVERY 8 HOURS PRN
COMMUNITY
Start: 2022-04-24

## 2023-05-18 RX ORDER — IBUPROFEN 600 MG/1
600 TABLET ORAL EVERY 6 HOURS PRN
COMMUNITY
Start: 2020-01-20

## 2023-05-18 RX ORDER — LIDOCAINE 50 MG/G
1 PATCH TOPICAL EVERY 24 HOURS
COMMUNITY
Start: 2020-01-20

## 2023-05-18 RX ORDER — LORAZEPAM 1 MG/1
1 TABLET ORAL EVERY 6 HOURS PRN
COMMUNITY
Start: 2022-04-24

## 2023-05-18 RX ORDER — TRAZODONE HYDROCHLORIDE 100 MG/1
100 TABLET ORAL
COMMUNITY
Start: 2017-09-01